# Patient Record
Sex: FEMALE | Race: WHITE | NOT HISPANIC OR LATINO | Employment: OTHER | URBAN - METROPOLITAN AREA
[De-identification: names, ages, dates, MRNs, and addresses within clinical notes are randomized per-mention and may not be internally consistent; named-entity substitution may affect disease eponyms.]

---

## 2017-02-08 ENCOUNTER — TELEPHONE (OUTPATIENT)
Dept: FAMILY MEDICINE | Facility: CLINIC | Age: 62
End: 2017-02-08

## 2017-02-08 ENCOUNTER — TRANSFERRED RECORDS (OUTPATIENT)
Dept: HEALTH INFORMATION MANAGEMENT | Facility: CLINIC | Age: 62
End: 2017-02-08

## 2017-02-09 NOTE — TELEPHONE ENCOUNTER
Do not call for mammos. Pt goes to [facility] for mammos. She had one done last yr and is not due yet. I called  [facility] and they will be faxing a report to radiology dept. Will await and update HM and send to [facility]. NX

## 2017-02-22 DIAGNOSIS — F41.1 GENERALIZED ANXIETY DISORDER: ICD-10-CM

## 2017-02-23 RX ORDER — VENLAFAXINE HYDROCHLORIDE 37.5 MG/1
75 CAPSULE, EXTENDED RELEASE ORAL DAILY
Qty: 180 CAPSULE | Refills: 0 | Status: SHIPPED | OUTPATIENT
Start: 2017-02-23 | End: 2017-05-18 | Stop reason: DRUGHIGH

## 2017-03-15 ENCOUNTER — TELEPHONE (OUTPATIENT)
Dept: FAMILY MEDICINE | Facility: CLINIC | Age: 62
End: 2017-03-15

## 2017-03-15 ENCOUNTER — TRANSFERRED RECORDS (OUTPATIENT)
Dept: HEALTH INFORMATION MANAGEMENT | Facility: CLINIC | Age: 62
End: 2017-03-15

## 2017-03-15 NOTE — TELEPHONE ENCOUNTER
Panel Management Review      Patient has the following on her problem list:     Hypertension   Last three blood pressure readings:  BP Readings from Last 3 Encounters:   12/08/16 131/73   04/26/16 138/78   12/17/15 126/82     Blood pressure: Passed    HTN Guidelines:  Age 18-59 BP range:  Less than 140/90  Age 60-85 with Diabetes:  Less than 140/90  Age 60-85 without Diabetes:  less than 150/90      Composite cancer screening  Chart review shows that this patient is due/due soon for the following Pap Smear  Summary:    Patient is due/failing the following:   PAP and PHYSICAL    Action needed:   Patient needs office visit for Physical/pap.    Type of outreach:    Sent [employer] message.    Questions for provider review:    None                                                                                                                                    LILA Shields MA       Chart routed to close .

## 2017-03-23 ENCOUNTER — TRANSFERRED RECORDS (OUTPATIENT)
Dept: HEALTH INFORMATION MANAGEMENT | Facility: CLINIC | Age: 62
End: 2017-03-23

## 2017-05-13 DIAGNOSIS — F41.1 GENERALIZED ANXIETY DISORDER: ICD-10-CM

## 2017-05-13 RX ORDER — VENLAFAXINE HYDROCHLORIDE 37.5 MG/1
CAPSULE, EXTENDED RELEASE ORAL
Qty: 180 CAPSULE | Refills: 0 | Status: CANCELLED | OUTPATIENT
Start: 2017-05-13

## 2017-05-16 NOTE — TELEPHONE ENCOUNTER
Please schedule for an appointment, she has not been seen for this in the past year. Please give her 30 day supply and have her come in for follow up for refills.   Kristen Kehr PA-C

## 2017-05-16 NOTE — TELEPHONE ENCOUNTER
Pt advised at last refill ov needed, no appointment made.  To provider to advise.  Suyapa Perkins RN

## 2017-05-18 ENCOUNTER — OFFICE VISIT (OUTPATIENT)
Dept: FAMILY MEDICINE | Facility: CLINIC | Age: 62
End: 2017-05-18
Payer: COMMERCIAL

## 2017-05-18 VITALS
DIASTOLIC BLOOD PRESSURE: 84 MMHG | BODY MASS INDEX: 41.88 KG/M2 | WEIGHT: 244 LBS | SYSTOLIC BLOOD PRESSURE: 154 MMHG | HEART RATE: 83 BPM | OXYGEN SATURATION: 94 % | TEMPERATURE: 97.9 F

## 2017-05-18 DIAGNOSIS — N39.498 OTHER URINARY INCONTINENCE: ICD-10-CM

## 2017-05-18 DIAGNOSIS — S32.10XA: ICD-10-CM

## 2017-05-18 DIAGNOSIS — F41.1 GENERALIZED ANXIETY DISORDER: Primary | ICD-10-CM

## 2017-05-18 DIAGNOSIS — T14.8XXA HEMATOMA: ICD-10-CM

## 2017-05-18 PROCEDURE — 99213 OFFICE O/P EST LOW 20 MIN: CPT | Performed by: PHYSICIAN ASSISTANT

## 2017-05-18 RX ORDER — VENLAFAXINE HYDROCHLORIDE 75 MG/1
75 CAPSULE, EXTENDED RELEASE ORAL DAILY
Qty: 90 CAPSULE | Refills: 1 | Status: ON HOLD | OUTPATIENT
Start: 2017-05-18 | End: 2020-09-23

## 2017-05-18 ASSESSMENT — ANXIETY QUESTIONNAIRES
5. BEING SO RESTLESS THAT IT IS HARD TO SIT STILL: NOT AT ALL
2. NOT BEING ABLE TO STOP OR CONTROL WORRYING: NOT AT ALL
IF YOU CHECKED OFF ANY PROBLEMS ON THIS QUESTIONNAIRE, HOW DIFFICULT HAVE THESE PROBLEMS MADE IT FOR YOU TO DO YOUR WORK, TAKE CARE OF THINGS AT HOME, OR GET ALONG WITH OTHER PEOPLE: NOT DIFFICULT AT ALL
1. FEELING NERVOUS, ANXIOUS, OR ON EDGE: NOT AT ALL
7. FEELING AFRAID AS IF SOMETHING AWFUL MIGHT HAPPEN: NOT AT ALL
3. WORRYING TOO MUCH ABOUT DIFFERENT THINGS: NOT AT ALL
6. BECOMING EASILY ANNOYED OR IRRITABLE: NOT AT ALL
GAD7 TOTAL SCORE: 0

## 2017-05-18 ASSESSMENT — PATIENT HEALTH QUESTIONNAIRE - PHQ9: 5. POOR APPETITE OR OVEREATING: NOT AT ALL

## 2017-05-18 NOTE — MR AVS SNAPSHOT
After Visit Summary   5/18/2017    Mary Lou Connelly    MRN: [id number]           Patient Information     Date Of Birth          1955        Visit Information        Provider Department      5/18/2017 11:50 AM Kehr, Kristen M, PA-C [facility]        Today's Diagnoses     Generalized anxiety disorder           Follow-ups after your visit        Follow-up notes from your care team     Return in about 6 months (around 11/18/2017).      Who to contact     If you have questions or need follow up information about today's clinic visit or your schedule please contact [facility] directly at [phone].  Normal or non-critical lab and imaging results will be communicated to you by MyChart, letter or phone within 4 business days after the clinic has received the results. If you do not hear from us within 7 days, please contact the clinic through [employer] or phone. If you have a critical or abnormal lab result, we will notify you by phone as soon as possible.  Submit refill requests through [employer] or call your pharmacy and they will forward the refill request to us. Please allow 3 business days for your refill to be completed.          Additional Information About Your Visit        MyChart Information     [employer] gives you secure access to your electronic health record. If you see a primary care provider, you can also send messages to your care team and make appointments. If you have questions, please call your primary care clinic.  If you do not have a primary care provider, please call [phone] and they will assist you.        Care EveryWhere ID     This is your Care EveryWhere ID. This could be used by other organizations to access your [facility] medical records  [id number]        Your Vitals Were     Pulse Temperature Pulse Oximetry BMI (Body Mass Index)          83 97.9  F (36.6  C) (Oral) 94% 41.88 kg/m2         Blood Pressure from Last 3 Encounters:   05/18/17 154/84    12/08/16 131/73   04/26/16 138/78    Weight from Last 3 Encounters:   05/18/17 244 lb (110.7 kg)   12/08/16 239 lb (108.4 kg)   04/26/16 236 lb (107 kg)              Today, you had the following     No orders found for display         Today's Medication Changes          These changes are accurate as of: 5/18/17 12:25 PM.  If you have any questions, ask your nurse or doctor.               These medicines have changed or have updated prescriptions.        Dose/Directions    venlafaxine 75 MG 24 hr capsule   Commonly known as:  EFFEXOR-XR   This may have changed:  medication strength   Used for:  Generalized anxiety disorder   Changed by:  Kehr, Kristen M, PA-C        Dose:  75 mg   Take 1 capsule (75 mg) by mouth daily   Quantity:  90 capsule   Refills:  1            Where to get your medicines      These medications were sent to [facility] [address] [place] [address] [address] [facility]  [address] [facility] [address]     Phone:  [phone]     venlafaxine 75 MG 24 hr capsule                Primary Care Provider Office Phone # Fax #    Kristen M Kehr, PA-C [phone]       [facility] [address] [facility] [address]        Thank you!     Thank you for choosing [facility]  for your care. Our goal is always to provide you with excellent care. Hearing back from our patients is one way we can continue to improve our services. Please take a few minutes to complete the written survey that you may receive in the mail after your visit with us. Thank you!             Your Updated Medication List - Protect others around you: Learn how to safely use, store and throw away your medicines at www.disposemymeds.org.          This list is accurate as of: 5/18/17 12:25 PM.  Always use your most recent med list.                   Brand Name Dispense Instructions for use    ALEVE PO      Take 220 mg by mouth       B-COMPLEX PO       Take by mouth daily       cetirizine-psuedoePHEDrine 5-120 MG per 12 hr tablet    zyrTEC-D    60 tablet    Take 1 tablet by mouth 2 times daily       cholecalciferol 5000 UNITS Caps          FISH OIL PO      Take 1,400 mg by mouth daily       FOSAMAX PO          METAMUCIL PO      Take 1 teaspoonful by mouth daily Take one teaspoon daily as needed       MULTI-VITAMIN PO      ONE TABLET ONCE A DAY       venlafaxine 75 MG 24 hr capsule    EFFEXOR-XR    90 capsule    Take 1 capsule (75 mg) by mouth daily
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
## 2017-05-18 NOTE — NURSING NOTE
"Chief Complaint   Patient presents with     Anxiety     Refill Request       Initial /84 (Cuff Size: Adult Large)  Pulse 83  Temp 97.9  F (36.6  C) (Oral)  Wt 244 lb (110.7 kg)  SpO2 94%  BMI 41.88 kg/m2 Estimated body mass index is 41.88 kg/(m^2) as calculated from the following:    Height as of 12/8/16: 5' 4\" (1.626 m).    Weight as of this encounter: 244 lb (110.7 kg).  Medication Reconciliation: complete    LILA Shields MA  "

## 2017-05-19 ASSESSMENT — PATIENT HEALTH QUESTIONNAIRE - PHQ9: SUM OF ALL RESPONSES TO PHQ QUESTIONS 1-9: 4

## 2017-05-19 ASSESSMENT — ANXIETY QUESTIONNAIRES: GAD7 TOTAL SCORE: 0

## 2017-06-05 DIAGNOSIS — J30.1 SEASONAL ALLERGIC RHINITIS DUE TO POLLEN: Primary | ICD-10-CM

## 2017-06-05 DIAGNOSIS — J30.2 SEASONAL ALLERGIES: ICD-10-CM

## 2017-06-05 RX ORDER — CETIRIZINE HYDROCHLORIDE, PSEUDOEPHEDRINE HYDROCHLORIDE 5; 120 MG/1; MG/1
TABLET, FILM COATED, EXTENDED RELEASE ORAL
Qty: 168 TABLET | Refills: 1 | Status: SHIPPED | OUTPATIENT
Start: 2017-06-05 | End: 2018-03-01

## 2017-06-06 ENCOUNTER — TRANSFERRED RECORDS (OUTPATIENT)
Dept: HEALTH INFORMATION MANAGEMENT | Facility: CLINIC | Age: 62
End: 2017-06-06

## 2017-06-27 ENCOUNTER — TELEPHONE (OUTPATIENT)
Dept: FAMILY MEDICINE | Facility: CLINIC | Age: 62
End: 2017-06-27

## 2017-06-27 NOTE — TELEPHONE ENCOUNTER
Panel Management Review      Patient has the following on her problem list:     Hypertension   Last three blood pressure readings:  BP Readings from Last 3 Encounters:   05/18/17 154/84   12/08/16 131/73   04/26/16 138/78     Blood pressure: FAILED    HTN Guidelines:  Age 18-59 BP range:  Less than 140/90  Age 60-85 with Diabetes:  Less than 140/90  Age 60-85 without Diabetes:  less than 150/90      Composite cancer screening  Chart review shows that this patient is due/due soon for the following Pap Smear  Summary:    Patient is due/failing the following:   BP CHECK and PAP    Action needed:   Patient needs office visit for physical/pap and blood pressure recheck.    Type of outreach:    Sent [employer] message.    Questions for provider review:    None                                                                                                                                    Kenn MERCADO MA       Chart routed to close .

## 2017-10-01 ENCOUNTER — HEALTH MAINTENANCE LETTER (OUTPATIENT)
Age: 62
End: 2017-10-01

## 2017-10-09 ENCOUNTER — TELEPHONE (OUTPATIENT)
Dept: FAMILY MEDICINE | Facility: CLINIC | Age: 62
End: 2017-10-09

## 2017-10-09 NOTE — TELEPHONE ENCOUNTER
Panel Management Review      Patient has the following on her problem list:     Hypertension   Last three blood pressure readings:  BP Readings from Last 3 Encounters:   05/18/17 154/84   12/08/16 131/73   04/26/16 138/78     Blood pressure: FAILED    HTN Guidelines:  Age 18-59 BP range:  Less than 140/90  Age 60-85 with Diabetes:  Less than 140/90  Age 60-85 without Diabetes:  less than 150/90        Composite cancer screening  Chart review shows that this patient is due/due soon for the following Pap Smear  Summary:    Patient is due/failing the following:   BP CHECK and PHYSICAL    Action needed:   Patient needs office visit for physical/pap and bp recheck.    Type of outreach:    Sent [employer] message.    Questions for provider review:    None                                                                                                                                    Kenn MERCADO MA       Chart routed to close .

## 2017-10-15 ENCOUNTER — HEALTH MAINTENANCE LETTER (OUTPATIENT)
Age: 62
End: 2017-10-15

## 2017-11-06 DIAGNOSIS — F41.1 GENERALIZED ANXIETY DISORDER: ICD-10-CM

## 2017-11-07 NOTE — TELEPHONE ENCOUNTER
PCP asked patient to submit an [employer] E-visit this month for anxiety med refills.    [employer] message sent to patient.   See message for details.    Renetta Lomas RN

## 2017-11-08 RX ORDER — VENLAFAXINE HYDROCHLORIDE 75 MG/1
CAPSULE, EXTENDED RELEASE ORAL
Qty: 90 CAPSULE | Refills: 0 | OUTPATIENT
Start: 2017-11-08

## 2018-01-30 ENCOUNTER — TELEPHONE (OUTPATIENT)
Dept: FAMILY MEDICINE | Facility: CLINIC | Age: 63
End: 2018-01-30

## 2018-01-30 NOTE — TELEPHONE ENCOUNTER
Panel Management Review      Patient has the following on her problem list:     Hypertension   Last three blood pressure readings:  BP Readings from Last 3 Encounters:   05/18/17 154/84   12/08/16 131/73   04/26/16 138/78     Blood pressure: FAILED    HTN Guidelines:  Age 18-59 BP range:  Less than 140/90  Age 60-85 with Diabetes:  Less than 140/90  Age 60-85 without Diabetes:  less than 150/90      Composite cancer screening  Chart review shows that this patient is due/due soon for the following Pap Smear and Mammogram  Summary:    Patient is due/failing the following:   PHYSICAL    Action needed:   Patient needs office visit for physical/pap.    Type of outreach:    Sent [employer] message.    Questions for provider review:    None                                                                                                                                    Kenn MERCADO MA       Chart routed to close .

## 2018-02-08 ENCOUNTER — TRANSFERRED RECORDS (OUTPATIENT)
Dept: HEALTH INFORMATION MANAGEMENT | Facility: CLINIC | Age: 63
End: 2018-02-08

## 2018-02-08 NOTE — TELEPHONE ENCOUNTER
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 Yes/No?  IF YES, list clinic names: NO   Are there outside records? Yes/No? YES - see below   Patient Contact (Y/N) & Call Details: NO    Action: Faxed request      OUTSIDE RECORDS CHECKLIST     CLINIC NAME COMMENTS REC (x) IMG (x)   [facility]  x    [facility]  none X.cssac     [facility]  none x

## 2018-02-08 NOTE — TELEPHONE ENCOUNTER
Records Received From: O     Date/Exam/Location  (specify location if different)   Office Notes: 2/8/18, 9/27/17, 4/5/17, 3/15/17, 2/8/17, 12/14/16, 11/16/16, 10/5/16   Radiology Reports: - xray hip 2/8/18, 9/27/17, 2/8/17, 11/16/16  - xray pelvis 12/14/16  Liset Llanos Notified (Y/N): no     ACTION    What did you do? Faxed request to [facility]

## 2018-02-08 NOTE — TELEPHONE ENCOUNTER
Radiology Reports From: [facility]   Exam Date/Name: MRI pelvis 3/23/17  MRI right hip 3/15/17  CT right hip 2/8/17   Comments: Reports sent to clinic. Notified Fern to pull images

## 2018-02-20 NOTE — TELEPHONE ENCOUNTER
Records Received From: [facility]     Date/Exam/Location  (specify location if different)   Radiology Reports: MR lumbar 8/16/16- [facility]   MR sacrum coccyx 8/17/16- [facility]   NM bone whole body 8/23/16- [facility]   Liset Llanos Notified (Y/N):      Note:   Per fax images will be pushed

## 2018-02-26 ASSESSMENT — ENCOUNTER SYMPTOMS
LEG PAIN: 0
MYALGIAS: 1
HYPOTENSION: 0
ORTHOPNEA: 0
HYPERTENSION: 0
JOINT SWELLING: 1
SYNCOPE: 0
LIGHT-HEADEDNESS: 0
EXERCISE INTOLERANCE: 0
ARTHRALGIAS: 1
BACK PAIN: 1
PALPITATIONS: 0
STIFFNESS: 1
SLEEP DISTURBANCES DUE TO BREATHING: 0

## 2018-02-26 ASSESSMENT — HOOS S4: HOW SEVERE IS YOUR HIP JOINT STIFFNESS AFTER FIRST WAKENING IN THE MORNING?: SEVERE

## 2018-02-26 ASSESSMENT — ACTIVITIES OF DAILY LIVING (ADL)
ADL_MEAN: 2.29
ADL_SUBSCALE_SCORE: 42.64
ADL_SUM: 39

## 2018-02-27 NOTE — TELEPHONE ENCOUNTER
Radiology Reports [facility]: [facility]    Exam Date/Name: MR sacrum coccyx 8/17/16  NM bone whole body 8/23/16  MR Lumbar 8/16/16   Comments: Waiting for images to be pushed

## 2018-02-28 NOTE — TELEPHONE ENCOUNTER
Phone Call:    Who did you talk to? (or) Who did you call? NOMI arzate   Call Detail/Action: She will check on images being pushed

## 2018-03-01 ENCOUNTER — OFFICE VISIT (OUTPATIENT)
Dept: ORTHOPEDICS | Facility: CLINIC | Age: 63
End: 2018-03-01
Payer: COMMERCIAL

## 2018-03-01 ENCOUNTER — PRE VISIT (OUTPATIENT)
Dept: ORTHOPEDICS | Facility: CLINIC | Age: 63
End: 2018-03-01

## 2018-03-01 VITALS — WEIGHT: 249.5 LBS | HEIGHT: 64 IN | BODY MASS INDEX: 42.59 KG/M2

## 2018-03-01 DIAGNOSIS — M16.10 HIP ARTHRITIS: Primary | ICD-10-CM
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
## 2018-03-01 NOTE — MR AVS SNAPSHOT
After Visit Summary   3/1/2018    Mary Lou Connelly    MRN: [id number]           Patient Information     Date Of Birth          1955        Visit Information        Provider Department      3/1/2018 9:15 AM Emmanuel Box MD [facility]        Today's Diagnoses     Hip arthritis    -  1       Follow-ups after your visit        Follow-up notes from your care team     Return in about 6 months (around 9/1/2018).      Your next 10 appointments already scheduled     Mar 09, 2018  8:00 AM CST   (Arrive by 7:45 AM)   CT PELVIS W/O CONTRAST with UCCT2   [facility] [place] ([facility])    [address]  1st Floor  [place] [facility] [place] [address] [phone]           Please bring any scans or X-rays taken at other hospitals, if similar tests were done. Also bring a list of your medicines, including vitamins, minerals and over-the-counter drugs. It is safest to leave personal items at home.  Be sure to tell your doctor:   If you have any allergies.   If there s any chance you are pregnant.   If you are breastfeeding.  You may have contrast for this exam. To prepare:   Do not eat or drink for 2 hours before your exam. If you need to take medicine, you may take it with small sips of water. (We may ask you to take liquid medicine as well.)   The day before your exam, drink extra fluids at least six 8-ounce glasses (unless your doctor tells you to restrict your fluids).   You will be given instructions on how to drink the contrast.  Patients over 70 or patients with diabetes or kidney problems:   If you haven t had a blood test (creatinine test) within the last 30 days, the Cardiologist/Radiologist may require you to get this test prior to your exam.  If you have diabetes:   Continue to take your metformin medication on the day of your exam  Please wear loose clothing, such as a sweat suit or jogging clothes. Avoid snaps, zippers and other metal. We may ask  you to undress and put on a hospital gown.  If you have any questions, please call the Imaging Department where you will have your exam.            Mar 09, 2018  8:30 AM CST   (Arrive by 8:15 AM)   MR PELVIS W/O & W CONTRAST with [id number]   [facility] ([facility] [place])    [address]  [address]st [facility] [id number] [phone]           Take your medicines as usual, unless your doctor tells you not to. Bring a list of your current medicines to your exam (including vitamins, minerals and over-the-counter drugs).  You may or may not receive intravenous (IV) contrast for this exam pending the discretion of the Radiologist.  You do not need to do anything special to prepare.  The MRI machine uses a strong magnet. Please wear clothes without metal (snaps, zippers). A sweatsuit works well, or we may give you a hospital gown.  Please remove any body piercings and hair extensions before you arrive. You will also remove watches, jewelry, hairpins, wallets, dentures, partial dental plates and hearing aids. You may wear contact lenses, and you may be able to wear your rings. We have a safe place to keep your personal items, but it is safer to leave them at home.  **IMPORTANT** THE INSTRUCTIONS BELOW ARE ONLY FOR THOSE PATIENTS WHO HAVE BEEN PRESCRIBED SEDATION OR GENERAL ANESTHESIA DURING THEIR MRI PROCEDURE:  IF YOUR DOCTOR PRESCRIBED ORAL SEDATION (take medicine to help you relax during your exam):   You must get the medicine from your doctor (oral medication) before you arrive. Bring the medicine to the exam. Do not take it at home. You ll be told when to take it upon arriving for your exam.   Arrive one hour early. Bring someone who can take you home after the test. Your medicine will make you sleepy. After the exam, you may not drive, take a bus or take a taxi by yourself.  IF YOUR DOCTOR PRESCRIBED IV SEDATION:   Arrive one hour early. Bring someone who can take you  home after the test. Your medicine will make you sleepy. After the exam, you may not drive, take a bus or take a taxi by yourself.   No eating 6 hours before your exam. You may have clear liquids up until 4 hours before your exam. (Clear liquids include water, clear tea, black coffee and fruit juice without pulp.)  IF YOUR DOCTOR PRESCRIBED ANESTHESIA (be asleep for your exam):   Arrive 1 1/2 hours early. Bring someone who can take you home after the test. You may not drive, take a bus or take a taxi by yourself.   No eating 8 hours before your exam. You may have clear liquids up until 4 hours before your exam. (Clear liquids include water, clear tea, black coffee and fruit juice without pulp.)   You will spend four to five hours in the recovery room.  Please call the Imaging Department at your exam site with any questions.            Mar 09, 2018  9:15 AM CST   LAB with [facility] ([facility])    [address] [place] [id number] [phone]           Please do not eat 10-12 hours before your appointment if you are coming in fasting for labs on lipids, cholesterol, or glucose (sugar). This does not apply to pregnant women. Water, hot tea and black coffee (with nothing added) are okay. Do not drink other fluids, diet soda or chew gum.              Future tests that were ordered for you today     Open Future Orders        Priority Expected Expires Ordered    Alkaline phosphatase Routine  3/31/2018 3/1/2018    Calcium Routine  3/31/2018 3/1/2018    Creatinine Routine  3/31/2018 3/1/2018    Phosphorus Routine  3/31/2018 3/1/2018    Parathyroid Hormone Intact Routine  3/31/2018 3/1/2018    Urea nitrogen Routine  3/31/2018 3/1/2018    Vitamin D Deficiency Routine  3/31/2018 3/1/2018    CT Pelvis w/o contrast Routine  3/1/2019 3/1/2018    MRI Pelvis w & w/o contrast Routine  3/1/2019 3/1/2018            Who to contact     Please call your clinic at  "[phone] to:    Ask questions about your health    Make or cancel appointments    Discuss your medicines    Learn about your test results    Speak to your doctor            Additional Information About Your Visit        [employer] Information     [employer] gives you secure access to your electronic health record. If you see a primary care provider, you can also send messages to your care team and make appointments. If you have questions, please call your primary care clinic.  If you do not have a primary care provider, please call [phone] and they will assist you.      V[employer]creen is an electronic gateway that provides easy, online access to your medical records. With [employer], you can request a clinic appointment, read your test results, renew a prescription or communicate with your care team.     To access your existing account, please contact your [facility] or call [phone] for assistance.        Care EveryWhere ID     This is your Care EveryWhere ID. This could be used by other organizations to access your [facility] medical records  [id number]        Your Vitals Were     Height BMI (Body Mass Index)                1.626 m (5' 4\") 42.83 kg/m2           Blood Pressure from Last 3 Encounters:   05/18/17 154/84   12/08/16 131/73   04/26/16 138/78    Weight from Last 3 Encounters:   03/01/18 113.2 kg (249 lb 8 oz)   05/18/17 110.7 kg (244 lb)   12/08/16 108.4 kg (239 lb)               Primary Care Provider Office Phone # Fax #    Kristen M Kehr, PA-C [phone]       [address] [facility] [address]        Equal Access to Services     [facility]: Hadii aad ku hadasho Soomaali, waaxda luqadaha, qaybta kaalmada syeda, adi muñoz . So [facility] [phone].    ATENCIÓN: Si habla español, tiene a cooper disposición servicios gratuitos de asistencia lingüística. Llame al [phone].    We comply with applicable federal civil rights " laws and [place] laws. We do not discriminate on the basis of race, color, national origin, age, disability, sex, sexual orientation, or gender identity.            Thank you!     Thank you for choosing [facility]  for your care. Our goal is always to provide you with excellent care. Hearing back from our patients is one way we can continue to improve our services. Please take a few minutes to complete the written survey that you may receive in the mail after your visit with us. Thank you!             Your Updated Medication List - Protect others around you: Learn how to safely use, store and throw away your medicines at www.disposemymeds.org.          This list is accurate as of 3/1/18 11:59 PM.  Always use your most recent med list.                   Brand Name Dispense Instructions for use Diagnosis    ALEVE PO      Take 220 mg by mouth        B-COMPLEX PO      Take by mouth daily        cholecalciferol 5000 UNITS Caps           FISH OIL PO      Take 1,400 mg by mouth daily        FOSAMAX PO           HYDROCHLOROTHIAZIDE PO      Take 25 mg by mouth daily        METAMUCIL PO      Take 1 teaspoonful by mouth daily Take one teaspoon daily as needed        MULTI-VITAMIN PO      ONE TABLET ONCE A DAY        venlafaxine 75 MG 24 hr capsule    EFFEXOR-XR    90 capsule    Take 1 capsule (75 mg) by mouth daily    Generalized anxiety disorder

## 2018-03-01 NOTE — NURSING NOTE
"Chief Complaint   Patient presents with     Consult     Pt states that she is here today for FX Pelvis and Right Hip Osteoarthritis. 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 Referring:  JULIO GREEN THERESA         62 year old  1955    Ht 1.626 m (5' 4\")  Wt 113.2 kg (249 lb 8 oz)  BMI 42.83 kg/m2        [facility] [address] [address] [place], [place] - [address] [facility] [address] [address] [place], [place] - [address] [facility] [address] [place], [place] - [address] [facility] AT SEC OF [place] [address] [facility]    Allergies   Allergen Reactions     Nkda [No Known Drug Allergies]      Current Outpatient Prescriptions   Medication     HYDROCHLOROTHIAZIDE PO     venlafaxine (EFFEXOR-XR) 75 MG 24 hr capsule     Alendronate Sodium (FOSAMAX PO)     Naproxen Sodium (ALEVE PO)     cholecalciferol 5000 UNITS CAPS     B Complex-Biotin-FA (B-COMPLEX PO)     Omega-3 Fatty Acids (FISH OIL PO)     Psyllium (METAMUCIL PO)     MULTI-VITAMIN OR     No current facility-administered medications for this visit.          Questionnaires:  HOOS Hip Dysfunction & Osteoarthritis Outcome Questionnaire    Hip Dysfunction & Osteoarthritis Outcome Score (HOOS), English Version LK 2.0 (Shravan So, Claudio FROST., 2003) 2/26/2018   S1. Do you feel grinding, hear clicking or any other type of noise from your hip? Often   S2. Difficulties spreading legs wide apart Severe   S3. Difficulties to stride out when walking Severe   S4. How severe is your hip joint stiffness after first wakening in the morning? Severe   S5. How severe is your hip stiffness after sitting, lying or resting LATER IN THE DAY? Severe   Symptom Count 5   Symptom Sum 15   Symptom Mean 3   Symptom Subscale Score 25   P1. How often is your hip painful? Daily   P2. Straightening your hip fully Moderate   P3. Bending your hip FULLY Moderate   P4. Walking on a " flat surface Moderate   P5. Going up or down stairs Moderate   P6. At night while in bed Moderate   P7. Sitting or lying Moderate   P8. Standing upright Moderate   P9. Walking on a hard surface (asphalt, concrete, etc.) Moderate   P10. Walking on an uneven surface Moderate   Pain Count 10   Pain Sum 21   Pain Mean 2.1   Pain Subscale Score 47.5   A1. Descending stairs Moderate   A2. Ascending stairs Moderate   A3. Rising from sitting Moderate   A4. Standing Moderate   A5. Bending to the floor/ an object Mild   A6. Walking on a flat surface Moderate   A7. Getting in/out of car Severe   A8. Going shopping Severe   A9. Putting on socks/stockings Moderate   A10. Rising from bed Moderate   A11. Taking off socks/stockings Moderate   A12. Lying in bed (turning over, maintaining hip position) Severe   A13. Getting in/out of bed Severe   A14. Sitting Moderate   A15. Getting on/off toilet Moderate   A16. Heavy domestic duties (moving heavy boxes, scrubbing floors, etc.) Extreme   A17. Light domestic duties (cooking, dusting, etc.) Moderate   ADL Count 17   ADL Sum 39   ADL Mean 2.29   ADL Subscale Score 42.64   SP1. Squatting Severe   SP2. Running Severe   SP3. Twisting/pivoting on loaded leg Severe   SP4. Walking on uneven surface Severe   Sports/Rec Count 4   Sports/Rec Sum 12   Sports/Rec Mean 3   Sports/Rec Subscale Score 25   Q1. How often are you aware of your hip problem? Daily   Q2. Have you modified you life style to avoid activities potentially damaging to your hip? Totally   Q3. How much are you troubled with lack of confidence in your hip? Extremely   Q4. In general, how much difficulty do you have with your hip? Extreme   QOL Count 4   QOL Sum 15   QOL Mean 3.75   Quality of Life Subscale Score 6.25              Promis 10 Assessment    PROMIS 10 2/26/2018   In general, would you say your health is: Good   In general, would you say your quality of life is: Very good   In general, how would you rate your  physical health? Good   In general, how would you rate your mental health, including your mood and your ability to think? Very good   In general, how would you rate your satisfaction with your social activities and relationships? Very good   In general, please rate how well you carry out your usual social activities and roles Very good   To what extent are you able to carry out your everyday physical activities such as walking, climbing stairs, carrying groceries, or moving a chair? Moderately   How often have you been bothered by emotional problems such as feeling anxious, depressed or irritable? Rarely   How would you rate your fatigue on average? None   How would you rate your pain on average?   0 = No Pain  to  10 = Worst Imaginable Pain 3   In general, would you say your health is: 3   In general, would you say your quality of life is: 4   In general, how would you rate your physical health? 3   In general, how would you rate your mental health, including your mood and your ability to think? 4   In general, how would you rate your satisfaction with your social activities and relationships? 4   In general, please rate how well you carry out your usual social activities and roles. (This includes activities at home, at work and in your community, and responsibilities as a parent, child, spouse, employee, friend, etc.) 4   To what extent are you able to carry out your everyday physical activities such as walking, climbing stairs, carrying groceries, or moving a chair? 3   In the past 7 days, how often have you been bothered by emotional problems such as feeling anxious, depressed, or irritable? 2   In the past 7 days, how would you rate your fatigue on average? 1   In the past 7 days, how would you rate your pain on average, where 0 means no pain, and 10 means worst imaginable pain? 3   Global Mental Health Score 16   Global Physical Health Score 15   PROMIS TOTAL - SUBSCORES 31   Some recent data might be hidden               Cary Jarquin C.M.A.

## 2018-03-09 ENCOUNTER — RADIANT APPOINTMENT (OUTPATIENT)
Dept: CT IMAGING | Facility: CLINIC | Age: 63
End: 2018-03-09
Attending: ORTHOPAEDIC SURGERY
Payer: COMMERCIAL

## 2018-03-09 ENCOUNTER — RADIANT APPOINTMENT (OUTPATIENT)
Dept: MRI IMAGING | Facility: CLINIC | Age: 63
End: 2018-03-09
Attending: ORTHOPAEDIC SURGERY
Payer: COMMERCIAL

## 2018-03-09 LAB
ALP SERPL-CCNC: 103 U/L (ref 40–150)
BUN SERPL-MCNC: 27 MG/DL (ref 7–30)
CALCIUM SERPL-MCNC: 9.9 MG/DL (ref 8.5–10.1)
CREAT SERPL-MCNC: 0.86 MG/DL (ref 0.52–1.04)
DEPRECATED CALCIDIOL+CALCIFEROL SERPL-MC: 45 UG/L (ref 20–75)
GFR SERPL CREATININE-BSD FRML MDRD: 67 ML/MIN/1.7M2
PHOSPHATE SERPL-MCNC: 2.7 MG/DL (ref 2.5–4.5)
PTH-INTACT SERPL-MCNC: 59 PG/ML (ref 18–80)

## 2018-03-09 RX ORDER — GADOBUTROL 604.72 MG/ML
10 INJECTION INTRAVENOUS ONCE
Status: COMPLETED | OUTPATIENT
Start: 2018-03-09 | End: 2018-03-09

## 2018-03-09 RX ADMIN — GADOBUTROL 10 ML: 604.72 INJECTION INTRAVENOUS at 08:23

## 2018-03-09 NOTE — DISCHARGE INSTRUCTIONS
MRI Contrast Discharge Instructions    The IV contrast you received today will pass out of your body in your  urine. This will happen in the next 24 hours. You will not feel this process.  Your urine will not change color.    Drink at least 4 extra glasses of water or juice today (unless your doctor  has restricted your fluids). This reduces the stress on your kidneys.  You may take your regular medicines.    If you are on dialysis: It is best to have dialysis today.    If you have a reaction: Most reactions happen right away. If you have  any new symptoms after leaving the hospital (such as hives or swelling),  call your hospital at the correct number below. Or call your family doctor.  If you have breathing distress or wheezing, call 911.    Special instructions: ***    I have read and understand the above information.    Signature:______________________________________ Date:___________    Staff:__________________________________________ Date:___________     Time:__________    [facility] Departments:    ___[place]: [phone]  ___[facility]: [phone]  ___[place]: [phone] ___[facility]: [phone]  ___[facility]: [phone]  ___[facility]: [phone]  ___[phone]  ___[facility]: [phone]  ___[phone]

## 2018-03-15 NOTE — PROGRESS NOTES
The results were reviewed and any abnormal results were communicated to the patient via MY Chart about plan of action.    The multiple bony insufficiency type fractures are likely the result of the radiation.    Emmanuel Box MD  3/15/2018  7:24 AM

## 2018-03-15 NOTE — PROGRESS NOTES
The results are either normal or are clinically acceptable.    Emmanuel Box MD  3/15/2018  7:23 AM

## 2018-03-15 NOTE — PROGRESS NOTES
The results were reviewed and any abnormal results were communicated to the patient via MY Chart about plan of action.    I do not think any action is required based upon these test results.    Emmanuel Box MD  3/15/2018  7:24 AM

## 2018-05-10 ENCOUNTER — TELEPHONE (OUTPATIENT)
Dept: FAMILY MEDICINE | Facility: CLINIC | Age: 63
End: 2018-05-10

## 2018-05-10 NOTE — TELEPHONE ENCOUNTER
Panel Management Review      Patient has the following on her problem list:     Depression / Dysthymia review    Measure:  Needs PHQ-9 score of 4 or less during index window.  Administer PHQ-9 and if score is 5 or more, send encounter to provider for next steps.    5 - 7 month window range:     PHQ-9 SCORE 4/1/2013 8/12/2015 5/18/2017   Total Score 5 - -   Total Score MyChart - 2 -   Total Score - - 4       If PHQ-9 recheck is 5 or more, route to provider for next steps.    Patient is due for:  PHQ9 and DAP    Hypertension   Last three blood pressure readings:  BP Readings from Last 3 Encounters:   05/18/17 154/84   12/08/16 131/73   04/26/16 138/78     Blood pressure: FAILED    HTN Guidelines:  Age 18-59 BP range:  Less than 140/90  Age 60-85 with Diabetes:  Less than 140/90  Age 60-85 without Diabetes:  less than 150/90      Composite cancer screening  Chart review shows that this patient is due/due soon for the following Pap Smear and Mammogram  Summary:    Patient is due/failing the following:   BP CHECK, DAP, MAMMOGRAM, PAP, PHQ9 and PHYSICAL    Action needed:   Patient needs office visit for Physical/pap.    Type of outreach:    Sent Tizarot message.    Questions for provider review:    None                                                                                                                                    Kenn MERCADO MA       Chart routed to close .

## 2018-08-29 DIAGNOSIS — G47.33 OBSTRUCTIVE SLEEP APNEA SYNDROME: Primary | Chronic | ICD-10-CM

## 2018-10-02 DIAGNOSIS — M25.551 HIP PAIN, RIGHT: Primary | ICD-10-CM

## 2018-10-04 ENCOUNTER — OFFICE VISIT (OUTPATIENT)
Dept: ORTHOPEDICS | Facility: CLINIC | Age: 63
End: 2018-10-04
Payer: COMMERCIAL

## 2018-10-04 ENCOUNTER — RADIANT APPOINTMENT (OUTPATIENT)
Dept: GENERAL RADIOLOGY | Facility: CLINIC | Age: 63
End: 2018-10-04
Attending: ORTHOPAEDIC SURGERY
Payer: COMMERCIAL

## 2018-10-04 VITALS — HEIGHT: 64 IN | WEIGHT: 242.5 LBS | BODY MASS INDEX: 41.4 KG/M2

## 2018-10-04 DIAGNOSIS — M25.551 HIP PAIN, RIGHT: ICD-10-CM

## 2018-10-04 DIAGNOSIS — M16.7 OTHER SECONDARY OSTEOARTHRITIS OF RIGHT HIP: Primary | ICD-10-CM

## 2018-10-04 RX ORDER — PHENTERMINE HYDROCHLORIDE 15 MG/1
CAPSULE ORAL EVERY MORNING
Status: ON HOLD | COMMUNITY
End: 2020-09-22

## 2018-10-04 RX ORDER — LISINOPRIL 30 MG/1
10 TABLET ORAL DAILY
COMMUNITY
End: 2020-09-18

## 2018-10-04 ASSESSMENT — ENCOUNTER SYMPTOMS
FLANK PAIN: 0
STIFFNESS: 1
DECREASED CONCENTRATION: 0
ARTHRALGIAS: 1
NAUSEA: 0
NERVOUS/ANXIOUS: 1
JAUNDICE: 0
RECTAL PAIN: 0
INSOMNIA: 1
BOWEL INCONTINENCE: 1
DIFFICULTY URINATING: 0
MYALGIAS: 0
CONSTIPATION: 0
PANIC: 0
DYSURIA: 0
BACK PAIN: 1
MUSCLE CRAMPS: 0
BLOATING: 0
HEARTBURN: 1
MUSCLE WEAKNESS: 0
JOINT SWELLING: 0
HEMATURIA: 0
DIARRHEA: 1
BLOOD IN STOOL: 0
ABDOMINAL PAIN: 0
NECK PAIN: 0
VOMITING: 0

## 2018-10-04 NOTE — MR AVS SNAPSHOT
"              After Visit Summary   10/4/2018    Mary Lou Connelly    MRN: [id number]           Patient Information     Date Of Birth          1955        Visit Information        Provider Department      10/4/2018 1:45 PM Emmanuel Box MD [facility]        Today's Diagnoses     Other secondary osteoarthritis of right hip    -  1       Follow-ups after your visit        Who to contact     Please call your clinic at [phone] to:    Ask questions about your health    Make or cancel appointments    Discuss your medicines    Learn about your test results    Speak to your doctor            Additional Information About Your Visit        [employer] Information     [employer] gives you secure access to your electronic health record. If you see a primary care provider, you can also send messages to your care team and make appointments. If you have questions, please call your primary care clinic.  If you do not have a primary care provider, please call [phone] and they will assist you.      [employer] is an electronic gateway that provides easy, online access to your medical records. With [employer], you can request a clinic appointment, read your test results, renew a prescription or communicate with your care team.     To access your existing account, please contact your [facility] or call [phone] for assistance.        Care EveryWhere ID     This is your Care EveryWhere ID. This could be used by other organizations to access your [place] medical records  [id number]        Your Vitals Were     Height BMI (Body Mass Index)                1.626 m (5' 4\") 41.63 kg/m2           Blood Pressure from Last 3 Encounters:   05/18/17 154/84   12/08/16 131/73   04/26/16 138/78    Weight from Last 3 Encounters:   10/04/18 110 kg (242 lb 8 oz)   03/01/18 113.2 kg (249 lb 8 oz)   05/18/17 110.7 kg (244 lb)              Today, you had the following     No orders found for display    "    Primary Care Provider Office Phone # Fax #    Kristen M Kehr, PA-C [phone] [address] [facility] [address]        Equal Access to Services     MEGA ALTAMIRANO : Hadgerman cee ku weso Soomaali, waaxda luqadaha, qaybta kaalmada adeegyada, adi mcdermott laFoxkeaton tipton. So [facility] [phone].    ATENCIÓN: Si habla español, tiene a cooper disposición servicios gratuitos de asistencia lingüística. Llame al [phone].    We comply with applicable federal civil rights laws and [place] laws. We do not discriminate on the basis of race, color, national origin, age, disability, sex, sexual orientation, or gender identity.            Thank you!     Thank you for choosing [facility]  for your care. Our goal is always to provide you with excellent care. Hearing back from our patients is one way we can continue to improve our services. Please take a few minutes to complete the written survey that you may receive in the mail after your visit with us. Thank you!             Your Updated Medication List - Protect others around you: Learn how to safely use, store and throw away your medicines at www.disposemymeds.org.          This list is accurate as of 10/4/18  4:09 PM.  Always use your most recent med list.                   Brand Name Dispense Instructions for use Diagnosis    ALEVE PO      Take 220 mg by mouth        B-COMPLEX PO      Take by mouth daily        cholecalciferol 5000 units Caps           FISH OIL PO      Take 1,400 mg by mouth daily        HYDROCHLOROTHIAZIDE PO      Take 25 mg by mouth daily        lisinopril 30 MG tablet    PRINIVIL,ZESTRIL     Take 10 mg by mouth daily        METAMUCIL PO      Take 1 teaspoonful by mouth daily Take one teaspoon daily as needed        MULTI-VITAMIN PO      ONE TABLET ONCE A DAY        phentermine 15 MG capsule      Take by mouth every morning        venlafaxine 75 MG 24 hr capsule    EFFEXOR-XR    90 capsule    Take 1  capsule (75 mg) by mouth daily    Generalized anxiety disorder

## 2018-10-04 NOTE — PROGRESS NOTES
[facility]  Orthopaedic Oncology Surgery, Joint Replacement Consultation  by Emmanuel Box M.D.    Mary Lou Connelly MRN# [id number]   Age: 62 year old YOB: 1955     Requesting physician: Referred Self  Kehr, Kristen M       Background history:  DX:   1. 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 DJD R hip, ? Post-radiation    TREATMENTS:  1. 22 yrs ago, chemotherapy, Tejinder Rodriguez MD, [place]  2. 22 years ago, radiation therapy to pelvis and left hip, Dr. Real, [facility]             Assessment and Plan:   Assessment:  Advanced right hip osteoarthritis with cystic change on the femoral as well as acetabular side     History of squamous cell cancer anus, status post radiation and chemotherapy     Intrapelvic mass, per report diagnosis hemangioma    Plan:  1. There is no need for further treatment currently  2. Continue with weight loss. Exercises based around arm movements may be helpful.  3. The decision to move forward with treatment will be dependent upon her pain and loss in function rather than solely on radiographic appearance  4. Follow up in a year or sooner if there are any changes            History of Present Illness:     62 year old female with history of squamous cell anal cancer status post radiation and chemotherapy approximately 22 years ago, now in remission( Radiation performed by Dr. Real at [facility]. Chemotherapy managed by Dr. Tejinder Rodriguez of [place]), who presents for evaluation for right total hip arthroplasty. Of note, she has suffered insufficiency fractures of her bilateral superior and inferior pubic rami as well as her coccyx. These fractures were all sustained during minor events such as lifting her grandchildren, or rising from a chair. She is diagnosed with osteopenia and currently on Fosamax as well as vitamin D supplementation. Otherwise, The patient has a long-standing history of right hip pain for which she has been followed by Dr. Woody.  She's never undergone any medical management in terms of corticosteroid injections or physical therapy. However was being followed with serial pelvic x-rays. Due to significant progression of her osteoarthritis, cystic changes, and concern for pelvic reconstruction, Dr. Woody did refer her to Dr. Box for right total hip arthroplasty.  Of note, she does have a history of what she describes as we a hematoma in her right pelvis which is displacing her bladder, this was further evaluated when she developed symptoms of urinary incontinence.  She states she underwent an image guided biopsy, which was sent for pathology and returned as a hematoma. She no longer has symptoms of urinary incontinence.    I last saw this patient on 3/1/18; please see this note for further details. At that time she was interesting in having a right total hip arthroplasty. We planned to have another appointment after the patient had time to attempt weight loss and completed imaging studies. I reviewed a CT of her pelvis taken on 3/9/18 and noted that the multiple bony insufficiency type fractures are likely the result of the radiation. I also reviewed an MRI of her pelvis no 3/9/18 and noted that no action is required based around this imaging. I reviewed labs taken on this same date and similarly noted that they did not necessitate any action.    Today, she states she can feel more discomfort in her right hip. She doesn't feel a large amount of pain due to this. She feels stiffness after long periods of inactivity, such as sleeping. She feels these symptoms have become worse. She can hear clicking or cracking sounds when she walks, which is something that she is concerned about.     She reports that it is difficult for her to find a way to exercise due to her state of disability.           Physical Exam:     Exam deferred.           Data:   All laboratory data reviewed  All imaging studies reviewed by me    XR Pelvis with Right Hip 1  View:    Compared to before, bone formation may have increased.    Total Time = 15 min, 50% of which was spent in counseling and coordination of care as documented above.    Scribe Disclosure:   I, Errol Johnston, am serving as a scribe to document services personally performed by Emmanuel Box MD at this visit, based upon the provider's statements to me. All documentation has been reviewed by the aforementioned provider prior to being entered into the official medical record.

## 2018-10-04 NOTE — NURSING NOTE
"Chief Complaint   Patient presents with     RECHECK     Pt. states that she is returning today for 6 month F/u Right Hip Pain. She is wondering what the Treatment Plan is going to be other than surgery.        62 year old  1955    Ht 1.626 m (5' 4\")  Wt 110 kg (242 lb 8 oz)  BMI 41.63 kg/m2            [facility] [address] [address] [place], [place] - [address] [facility] [address] [place], [place] - [address] [facility] [address] [place], [place] - [address] [facility] [address] [place] [address] [facility]    Allergies   Allergen Reactions     Nkda [No Known Drug Allergies]      Current Outpatient Prescriptions   Medication     B Complex-Biotin-FA (B-COMPLEX PO)     cholecalciferol 5000 UNITS CAPS     HYDROCHLOROTHIAZIDE PO     lisinopril (PRINIVIL,ZESTRIL) 30 MG tablet     MULTI-VITAMIN OR     Naproxen Sodium (ALEVE PO)     Omega-3 Fatty Acids (FISH OIL PO)     phentermine 15 MG capsule     Psyllium (METAMUCIL PO)     venlafaxine (EFFEXOR-XR) 75 MG 24 hr capsule     No current facility-administered medications for this visit.                  "

## 2018-10-22 ENCOUNTER — HEALTH MAINTENANCE LETTER (OUTPATIENT)
Age: 63
End: 2018-10-22

## 2019-05-21 ENCOUNTER — TELEPHONE (OUTPATIENT)
Dept: FAMILY MEDICINE | Facility: CLINIC | Age: 64
End: 2019-05-21

## 2019-05-21 NOTE — TELEPHONE ENCOUNTER
Panel Management Review      Patient has the following on her problem list:     Depression / Dysthymia review    Measure:  Needs PHQ-9 score of 4 or less during index window.  Administer PHQ-9 and if score is 5 or more, send encounter to provider for next steps.    5 - 7 month window range:     PHQ-9 SCORE 4/1/2013 8/12/2015 5/18/2017   PHQ-9 Total Score 5 - -   PHQ-9 Total Score MyChart - 2 -   PHQ-9 Total Score - - 4       If PHQ-9 recheck is 5 or more, route to provider for next steps.    Patient is due for:  PHQ9 and DAP    Hypertension   Last three blood pressure readings:  BP Readings from Last 3 Encounters:   05/18/17 154/84   12/08/16 131/73   04/26/16 138/78     Blood pressure: FAILED    HTN Guidelines:  Less than 140/90      Composite cancer screening  Chart review shows that this patient is due/due soon for the following Pap Smear and Mammogram  Summary:    Patient is due/failing the following:   BP CHECK, MAMMOGRAM, PAP and PHYSICAL    Action needed:   Patient was to establish care with Internal Medicine due to her complex medical history.     Type of outreach:    none    Questions for provider review:    None                                                                                                                                    Kenn MERCADO MA       Chart routed to  .

## 2019-09-03 ENCOUNTER — OFFICE VISIT (OUTPATIENT)
Dept: SLEEP MEDICINE | Facility: CLINIC | Age: 64
End: 2019-09-03
Payer: COMMERCIAL

## 2019-09-03 VITALS
WEIGHT: 245 LBS | DIASTOLIC BLOOD PRESSURE: 71 MMHG | OXYGEN SATURATION: 95 % | SYSTOLIC BLOOD PRESSURE: 117 MMHG | HEART RATE: 71 BPM | BODY MASS INDEX: 41.83 KG/M2 | HEIGHT: 64 IN

## 2019-09-03 DIAGNOSIS — G47.33 OSA (OBSTRUCTIVE SLEEP APNEA): Primary | ICD-10-CM

## 2019-09-03 PROCEDURE — 99213 OFFICE O/P EST LOW 20 MIN: CPT | Performed by: PHYSICIAN ASSISTANT

## 2019-09-03 ASSESSMENT — MIFFLIN-ST. JEOR: SCORE: 1653.37

## 2019-09-03 NOTE — PATIENT INSTRUCTIONS
Your BMI is Body mass index is 42.72 kg/m .  Weight management is a personal decision.  If you are interested in exploring weight loss strategies, the following discussion covers the approaches that may be successful. Body mass index (BMI) is one way to tell whether you are at a healthy weight, overweight, or obese. It measures your weight in relation to your height.  A BMI of 18.5 to 24.9 is in the healthy range. A person with a BMI of 25 to 29.9 is considered overweight, and someone with a BMI of 30 or greater is considered obese. More than two-thirds of American adults are considered overweight or obese.  Being overweight or obese increases the risk for further weight gain. Excess weight may lead to heart disease and diabetes.  Creating and following plans for healthy eating and physical activity may help you improve your health.  Weight control is part of healthy lifestyle and includes exercise, emotional health, and healthy eating habits. Careful eating habits lifelong are the mainstay of weight control. Though there are significant health benefits from weight loss, long-term weight loss with diet alone may be very difficult to achieve- studies show long-term success with dietary management in less than 10% of people. Attaining a healthy weight may be especially difficult to achieve in those with severe obesity. In some cases, medications, devices and surgical management might be considered.  What can you do?  If you are overweight or obese and are interested in methods for weight loss, you should discuss this with your provider.     Consider reducing daily calorie intake by 500 calories.     Keep a food journal.     Avoiding skipping meals, consider cutting portions instead.    Diet combined with exercise helps maintain muscle while optimizing fat loss. Strength training is particularly important for building and maintaining muscle mass. Exercise helps reduce stress, increase energy, and improves fitness.  Increasing exercise without diet control, however, may not burn enough calories to loose weight.       Start walking three days a week 10-20 minutes at a time    Work towards walking thirty minutes five days a week     Eventually, increase the speed of your walking for 1-2 minutes at time    In addition, we recommend that you review healthy lifestyles and methods for weight loss available through the National Institutes of Health patient information sites:  http://win.niddk.nih.gov/publications/index.htm    And look into health and wellness programs that may be available through your health insurance provider, employer, local community center, or [employer].    Weight management plan: Patient was referred to their PCP to discuss a diet and exercise plan.

## 2019-09-03 NOTE — NURSING NOTE
"Chief Complaint   Patient presents with     CPAP Follow Up       Initial /71   Pulse 71   Ht 1.613 m (5' 3.5\")   Wt 111.1 kg (245 lb)   SpO2 95%   BMI 42.72 kg/m   Estimated body mass index is 42.72 kg/m  as calculated from the following:    Height as of this encounter: 1.613 m (5' 3.5\").    Weight as of this encounter: 111.1 kg (245 lb).    Medication Reconciliation: complete      "

## 2019-09-03 NOTE — PROGRESS NOTES
Obstructive Sleep Apnea - PAP Follow-Up Visit:    Chief Complaint   Patient presents with     CPAP Follow Up       Mary Lou Connelly comes in today for follow-up of their moderate sleep apnea, managed with CPAP.     HST 4/14 (#227)- AHI 20.2, supine AHI 27.9, L02 76%    Overall, she rates the experience with PAP as 10 (0 poor, 10 great). The mask is comfortable.    The mask is not leaking .  She is not snoring with the mask on. She is not having gasp arousals.  She is not having significant oral/nasal dryness. The pressure is comfortable.     Her PAP interface is Nasal Mask.    Bedtime is typically 2200. Usually it takes about 10 min minutes to fall asleep with the mask on. Wake time is typically 0630.  Patient is using PAP therapy 7 hours per night. The patient is usually getting 8 hours of sleep per night.    She does feel rested in the morning.    Total score - Steele: 5 (9/3/2019  2:00 PM)    ANTON Total Score: 1    Respironics  Auto-PAP 5 - 15 cmH2O 30 day usage data:    90% of days with > 4 hours of use. 0/30 days with no use.   Average use 7 hours 8 minutes per day.   CPAP 90% pressure 8.0cm.   AHI 2.2 events per hour.    She reports CPAP is going well. No sleep concerns today.    Past medical/surgical history, family history, social history, medications and allergies were reviewed.      Problem List:  Patient Active Problem List    Diagnosis Date Noted     Fracture of sacrum, unspecified fracture morphology, initial encounter 05/18/2017     Priority: Medium     Hematoma 05/18/2017     Priority: Medium     Other urinary incontinence 05/18/2017     Priority: Medium     Obesity, unspecified obesity severity, unspecified obesity type 04/26/2016     Priority: Medium     Seborrheic keratoses 04/26/2016     Priority: Medium     Choledocholithiasis 05/31/2015     Priority: Medium     Depression 05/31/2015     Priority: Medium     Environmental allergies 05/31/2015     Priority: Medium     Varicose veins with pain  "07/15/2014     Priority: Medium     Bilateral lower extremity edema 07/15/2014     Priority: Medium     Obstructive sleep apnea syndrome 04/08/2014     Priority: Medium     HST 4/14 (#227)- AHI 20.2, supine AHI 27.9, L02 76%       Generalized anxiety disorder 04/01/2013     Priority: Medium     Advanced directives, counseling/discussion 02/05/2013     Priority: Medium     Discussed advance care planning with patient; information given to patient to review. 2/5/2013          Seasonal allergies 08/09/2012     Priority: Medium     Hypertension goal BP (blood pressure) < 140/90 08/09/2012     Priority: Medium     Meniere disease 10/26/2011     Priority: Medium     Vitamin D deficiency 10/26/2011     Priority: Medium     CARDIOVASCULAR SCREENING; LDL GOAL LESS THAN 160 10/31/2010     Priority: Medium     Family history of malignant neoplasm of gastrointestinal tract 05/08/2007     Priority: Medium     Malignant neoplasm of anus (H) 05/08/2007     Priority: Medium        /71   Pulse 71   Ht 1.613 m (5' 3.5\")   Wt 111.1 kg (245 lb)   SpO2 95%   BMI 42.72 kg/m      Impression/Plan:    Moderate Sleep apnea.   Tolerating PAP well. Daytime symptoms are improved.   Continue current treatment.  Comprehensive DME.    Mary Lou Connelly will follow up in about 2 years or sooner if any concerns.    Fifteen minutes spent with patient, all of which were spent face-to-face counseling, consulting, coordinating plan of care regarding TANIA.      Toby Ayala PA-C  "

## 2020-02-23 ENCOUNTER — HEALTH MAINTENANCE LETTER (OUTPATIENT)
Age: 65
End: 2020-02-23

## 2020-05-26 ENCOUNTER — DOCUMENTATION ONLY (OUTPATIENT)
Dept: CARE COORDINATION | Facility: CLINIC | Age: 65
End: 2020-05-26

## 2020-06-22 ENCOUNTER — ANCILLARY PROCEDURE (OUTPATIENT)
Dept: GENERAL RADIOLOGY | Facility: CLINIC | Age: 65
End: 2020-06-22
Attending: ORTHOPAEDIC SURGERY
Payer: COMMERCIAL

## 2020-06-22 ENCOUNTER — OFFICE VISIT (OUTPATIENT)
Dept: ORTHOPEDICS | Facility: CLINIC | Age: 65
End: 2020-06-22
Payer: COMMERCIAL

## 2020-06-22 VITALS — WEIGHT: 206.8 LBS | BODY MASS INDEX: 36.06 KG/M2

## 2020-06-22 DIAGNOSIS — M25.551 HIP PAIN, RIGHT: Primary | ICD-10-CM

## 2020-06-22 DIAGNOSIS — M25.551 HIP PAIN, RIGHT: ICD-10-CM

## 2020-06-22 RX ORDER — MONTELUKAST SODIUM 4 MG/1
2 TABLET, CHEWABLE ORAL 2 TIMES DAILY
COMMUNITY
Start: 2020-06-18

## 2020-06-22 ASSESSMENT — PATIENT HEALTH QUESTIONNAIRE - PHQ9: SUM OF ALL RESPONSES TO PHQ QUESTIONS 1-9: 2

## 2020-06-22 NOTE — NURSING NOTE
Reason For Visit:   Chief Complaint   Patient presents with     Follow Up     Pain, right hip. Discuss surgery.  Patient stated that she had weight loss surgery and has loss approximately 45lbs. Current weight today is 206.8lbs        Pain Assessment  Patient Currently in Pain: Yes  0-10 Pain Scale: 2  Primary Pain Location: Hip(Right)  Pain Descriptors: Constant        Allergies   Allergen Reactions     Nkda [No Known Drug Allergies]            Cande Cottrell LPN

## 2020-06-22 NOTE — LETTER
6/22/2020      RE: Mary Lou Connelly  [address]  [place] [address]    Dear Colleague,    Thank you for referring your patient, Mary Lou Connelly, to the [facility]. Please see a copy of my visit note below.        [facility]  Orthopaedic Oncology Surgery, Joint Replacement Consultation  by Emmanuel Box M.D.    Mary Lou Connelly MRN# [id number]   Age: 62 year old YOB: 1955     Requesting physician: Referred Self  Kehr, Kristen M       Background history:  DX:   1. 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 DJD R hip, ? Post-radiation    TREATMENTS:  1. 22 yrs ago, chemotherapy, Tejinder Rodriguez MD, [place]  2. 22 years ago, radiation therapy to pelvis and left hip, Dr. Real, [facility]             Assessment and Plan:   Assessment:  64-year-old female with advanced right hip osteoarthritis with cystic change on the femoral as well as acetabular side   History of squamous cell cancer anus, status post radiation and chemotherapy.      Plan:  We had a long discussion with the patient regarding ongoing management options.  Reviewed surgical and nonsurgical treatments.  We reviewed total hip replacement in detail including the procedure, the implants, the recovery process, and long-term outcomes.  We reviewed that the risks of the surgery include but are not limited to infection, wound problems, dislocation, persistent pain, leg length discrepancy, loosening, revision surgery.  We also reviewed less common risks such as neurovascular injury fracture, and other implant-related issues.  We reviewed other medical complications such as a blood clot.  We discussed that the vast majority of cases have a highly successful outcome.  However there is a small subset of patients that do experience complications or problems following the hip replacement.  Based on a discussion of the risks and benefits, we believe that the benefits far outweigh the risks at this point and the  patient  would like to proceed with surgery.  We will work on scheduling surgery at a time that works well for them in the next few months.  They will contact us if they have any questions or concerns leading up to surgery.  Given her poor bone quality and history of radiation therapy a cemented total hip arthroplasty would be advised.  TIer 4.    Patient will attend a joint replacement class, visit with her cardiologist, anesthesiologist, dentist and primary care physicians.          History of Present Illness:     62 year old female with history of squamous cell anal cancer status post radiation and chemotherapy approximately 22 years ago, now in remission( Radiation performed by Dr. Real at [facility]. who presented for evaluation for right total hip arthroplasty in 2018. Of note, she has suffered insufficiency fractures of her bilateral superior and inferior pubic rami as well as her coccyx. These fractures were all sustained during minor events such as lifting her grandchildren, or rising from a chair. She is diagnosed with osteopenia and currently on Fosamax as well as vitamin D supplementation.     At that time the plan was to continue with weight loss. The decision to move forward with treatment will be dependent upon her pain and loss in function rather than solely on radiographic appearance and to follow up in a year or sooner if there are any changes.    In the interim patient has undergone a gastric sleeve bariatric surgery at [facility] in February of this year.  She has lost over 40 pounds.  In spite of this her pain of the right hip continues.  She is here to talk about possible right total hip arthroplasty.  She is experiencing night pain and initiation pain.  She ambulates with the help of a walker in and around the house.  Because of her recent bariatric surgery she no longer is able to use NSAIDs.  To mitigate the pain she uses Tylenol.  She has not received any recent intra-articular  injections.  Patient lives together with her  and daughter and has to walk stairs.  She works as an .    During her postop bariatric surgery she had a brief episode of atrial fibrillation which spontaneously converted to sinus rhythm.  She still needs to follow-up with a cardiologist to further evaluate this.           Physical Exam:      EXAMINATION pertinent findings:   VITAL SIGNS: There were no vitals taken for this visit.  Body mass index is 36.06 kg/m .  PSYCH: Pleasant, healthy-appearing, alert, oriented x3, cooperative. Normal mood and affect.  RESP: non labored breathing   ABD: benign, soft, non-tender, no acute peritoneal signs.   SKIN: grossly normal   LYMPHATIC: grossly normal   NEURO: grossly normal   VASCULAR: satisfactory perfusion of all extremities   MUSCULOSKELETAL:   Cautious gait with shortened stance over the right lower extremity.  Flexion/extension 90 degrees with an extension deficit of 5 degrees.  Strongly decreased rotations.  Neurovascularly intact.           Data:   All laboratory data reviewed  All imaging studies reviewed by me    XR Pelvis with Right Hip 1 View:    Severe osteoarthritic changes of the right hip with protrusion.  Medial wall intact.  Status post bilateral ramus superior and inferior fractures.      Pedro Araya MD  N Arthroplasty Fellow    Total Time = 25 min, 50% of which was spent in counseling and coordination of care as documented above.    Again, thank you for allowing me to participate in the care of your patient.      Sincerely,      Emmanuel Box MD

## 2020-06-22 NOTE — NURSING NOTE
Teaching Flowsheet   Relevant Diagnosis: osteoarthritis right hip,   Teaching Topic: Right total hip arthroplasty     Person(s) involved in teaching:   Patient     Motivation Level:  Asks Questions: Yes  Eager to Learn: Yes  Cooperative: Yes  Receptive (willing/able to accept information): Yes  Any cultural factors/Zoroastrian beliefs that may influence understanding or compliance? No       Patient demonstrates understanding of the following:  Reason for the appointment, diagnosis and treatment plan: Yes  Knowledge of proper use of medications and conditions for which they are ordered (with special attention to potential side effects or drug interactions): Yes  Which situations necessitate calling provider and whom to contact: Yes       Teaching Concerns Addressed:     Total joint class, booklet, dental work, cardiologist appointment, Preop physical, COVID 19 testing, Type and Screen. ABX in future     Proper use and care of cane (medical equip, care aids, etc.): NA  Nutritional needs and diet plan: NA  Pain management techniques: NA  Wound Care: Yes  How and/when to access community resources: Yes     Instructional Materials Used/Given: surgical pamphlet and joint replacement book to mail to patient.     Time spent with patient: 15 minutes.

## 2020-06-22 NOTE — PROGRESS NOTES
[facility]  Orthopaedic Oncology Surgery, Joint Replacement Consultation  by Emmanuel Box M.D.    Mary Lou Connelly MRN# [id number]   Age: 62 year old YOB: 1955     Requesting physician: Referred Self Kehr, Kristen M       Background history:  DX:   1. 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 DJD R hip, ? Post-radiation    TREATMENTS:  1. 22 yrs ago, chemotherapy, Tejinder Rodriguez MD, [facility]  2. 22 years ago, radiation therapy to pelvis and left hip[place] [facility]             Assessment and Plan:   Assessment:  64-year-old female with advanced right hip osteoarthritis with cystic change on the femoral as well as acetabular side   History of squamous cell cancer anus, status post radiation and chemotherapy.      Plan:  We had a long discussion with the patient regarding ongoing management options.  Reviewed surgical and nonsurgical treatments.  We reviewed total hip replacement in detail including the procedure, the implants, the recovery process, and long-term outcomes.  We reviewed that the risks of the surgery include but are not limited to infection, wound problems, dislocation, persistent pain, leg length discrepancy, loosening, revision surgery.  We also reviewed less common risks such as neurovascular injury fracture, and other implant-related issues.  We reviewed other medical complications such as a blood clot.  We discussed that the vast majority of cases have a highly successful outcome.  However there is a small subset of patients that do experience complications or problems following the hip replacement.  Based on a discussion of the risks and benefits, we believe that the benefits far outweigh the risks at this point and the patient  would like to proceed with surgery.  We will work on scheduling surgery at a time that works well for them in the next few months.  They will contact us if they have any questions or concerns leading up to surgery.  Given her poor  bone quality and history of radiation therapy a cemented total hip arthroplasty would be advised.  TIer 4.    Patient will attend a joint replacement class, visit with her cardiologist, anesthesiologist, dentist and primary care physicians.          History of Present Illness:     62 year old female with history of squamous cell anal cancer status post radiation and chemotherapy approximately 22 years ago, now in remission( Radiation performed by Dr. Real at [facility]. who presented for evaluation for right total hip arthroplasty in 2018. Of note, she has suffered insufficiency fractures of her bilateral superior and inferior pubic rami as well as her coccyx. These fractures were all sustained during minor events such as lifting her grandchildren, or rising from a chair. She is diagnosed with osteopenia and currently on Fosamax as well as vitamin D supplementation.     At that time the plan was to continue with weight loss. The decision to move forward with treatment will be dependent upon her pain and loss in function rather than solely on radiographic appearance and to follow up in a year or sooner if there are any changes.    In the interim patient has undergone a gastric sleeve bariatric surgery at [facility] in February of this year.  She has lost over 40 pounds.  In spite of this her pain of the right hip continues.  She is here to talk about possible right total hip arthroplasty.  She is experiencing night pain and initiation pain.  She ambulates with the help of a walker in and around the house.  Because of her recent bariatric surgery she no longer is able to use NSAIDs.  To mitigate the pain she uses Tylenol.  She has not received any recent intra-articular injections.  Patient lives together with her  and daughter and has to walk stairs.  She works as an .    During her postop bariatric surgery she had a brief episode of atrial fibrillation which spontaneously converted to  sinus rhythm.  She still needs to follow-up with a cardiologist to further evaluate this.           Physical Exam:      EXAMINATION pertinent findings:   VITAL SIGNS: There were no vitals taken for this visit.  Body mass index is 36.06 kg/m .  PSYCH: Pleasant, healthy-appearing, alert, oriented x3, cooperative. Normal mood and affect.  RESP: non labored breathing   ABD: benign, soft, non-tender, no acute peritoneal signs.   SKIN: grossly normal   LYMPHATIC: grossly normal   NEURO: grossly normal   VASCULAR: satisfactory perfusion of all extremities   MUSCULOSKELETAL:   Cautious gait with shortened stance over the right lower extremity.  Flexion/extension 90 degrees with an extension deficit of 5 degrees.  Strongly decreased rotations.  Neurovascularly intact.           Data:   All laboratory data reviewed  All imaging studies reviewed by me    XR Pelvis with Right Hip 1 View:    Severe osteoarthritic changes of the right hip with protrusion.  Medial wall intact.  Status post bilateral ramus superior and inferior fractures.      Pedro Araya MD  N Arthroplasty Fellow    Total Time = 25 min, 50% of which was spent in counseling and coordination of care as documented above.

## 2020-08-05 ENCOUNTER — TELEPHONE (OUTPATIENT)
Dept: ORTHOPEDICS | Facility: CLINIC | Age: 65
End: 2020-08-05

## 2020-08-05 DIAGNOSIS — M16.7 OTHER SECONDARY OSTEOARTHRITIS OF RIGHT HIP: Primary | ICD-10-CM

## 2020-08-05 NOTE — TELEPHONE ENCOUNTER
[facility] Call Center    Phone Message    May a detailed message be left on voicemail: yes     Reason for Call: Other: Pt would like a call back to discuss surgery dates as well as the class she has to take as she never recieved the packet that was suppose to be mailed out. PLease reach out to pt to discuss     Action Taken: Message routed to:  [facility] ([facility] Ortho    Travel Screening: Not Applicable

## 2020-08-07 ENCOUNTER — TELEPHONE (OUTPATIENT)
Dept: ORTHOPEDICS | Facility: CLINIC | Age: 65
End: 2020-08-07

## 2020-08-07 ENCOUNTER — PREP FOR PROCEDURE (OUTPATIENT)
Dept: ORTHOPEDICS | Facility: CLINIC | Age: 65
End: 2020-08-07

## 2020-08-07 DIAGNOSIS — M16.7 OTHER SECONDARY OSTEOARTHRITIS OF RIGHT HIP: Primary | ICD-10-CM

## 2020-08-07 NOTE — TELEPHONE ENCOUNTER
This writer called the patient back.  New surgery pamphlet being mailed.  Sept. Surgery date 9/22 - message sent to Li. Ok'd by the patient.     We reviewed the items need to complete - COVID 19 testing (2-3 days prior), dental work, Preop H & P with PCP to schedule. Type and Screen.    Fern Soto RN on 8/7/2020 at 2:40 PM

## 2020-08-12 DIAGNOSIS — Z11.59 ENCOUNTER FOR SCREENING FOR OTHER VIRAL DISEASES: Primary | ICD-10-CM

## 2020-08-12 PROBLEM — M16.7 OTHER SECONDARY OSTEOARTHRITIS OF RIGHT HIP: Status: ACTIVE | Noted: 2020-08-12

## 2020-09-14 DIAGNOSIS — G47.33 OBSTRUCTIVE SLEEP APNEA (ADULT) (PEDIATRIC): Primary | ICD-10-CM

## 2020-09-16 ENCOUNTER — TELEPHONE (OUTPATIENT)
Dept: ORTHOPEDICS | Facility: CLINIC | Age: 65
End: 2020-09-16

## 2020-09-16 NOTE — PROGRESS NOTES
SURGERY PLAN (PRE-OP PLAN)     Patient Position (indicated by x):  X  Lateral decubitus, Wixson hip positioner   x  Regular OR table   x  Viveros catheter   X  Revision LACEY drape with plastic side bags for leg   X  Blue U drape x2   Blue and white stockinet   Coban   Ioban      LACEY Requests (indicated by x):  x  Echo BiMetric stems (Back table - unopened)   X  Biomet TAPERLOC ingrowth stem      Biomet Integral cemented stem   X  Biomet RB cup, 32 heads     G7 cup      Biomet Bipolar cup      Specimens and cultures (indicated by x):      Tissue cultures, aerobic and anaerobic without gram stain      Frozen section      pathology specimens - fresh      pathology specimens - formalin      Background: (BMI 36) Hx radiation to pelvis for squamous cell cancer of the anus 22 years ago. She has suffered insufficiency fractures of her bilateral superior and inferior pubic rami as well as her coccyx. These fractures were all sustained during minor events such as lifting her grandchildren, or rising from a chair. She is diagnosed with osteopenia and currently on Fosamax as well as vitamin D supplementation. During her postop bariatric surgery she had a brief episode of atrial fibrillation which spontaneously converted to sinus rhythm.  She still needs to follow-up with a cardiologist to further evaluate this.    Plan: Primary right LACEY    Delonte Souza DO  Adult Joint Reconstruction Fellow  Dept Orthopaedic Surgery, [facility]  [phone] Pager [phone] Office

## 2020-09-16 NOTE — TELEPHONE ENCOUNTER
RN called and left a detailed VM for patient. RN wants to know if patient has seen her cardiologist to get a clearance for her upcoming surgery with Dr. Box  RN also told her if she had, she should get the result faxed to us.     Smita Mahajan RN

## 2020-09-18 DIAGNOSIS — Z11.59 ENCOUNTER FOR SCREENING FOR OTHER VIRAL DISEASES: ICD-10-CM

## 2020-09-18 PROCEDURE — U0003 INFECTIOUS AGENT DETECTION BY NUCLEIC ACID (DNA OR RNA); SEVERE ACUTE RESPIRATORY SYNDROME CORONAVIRUS 2 (SARS-COV-2) (CORONAVIRUS DISEASE [COVID-19]), AMPLIFIED PROBE TECHNIQUE, MAKING USE OF HIGH THROUGHPUT TECHNOLOGIES AS DESCRIBED BY CMS-2020-01-R: HCPCS | Performed by: ORTHOPAEDIC SURGERY

## 2020-09-18 RX ORDER — ASPIRIN 81 MG/1
81 TABLET, CHEWABLE ORAL DAILY
Status: ON HOLD | COMMUNITY
End: 2020-09-23

## 2020-09-19 LAB
SARS-COV-2 RNA SPEC QL NAA+PROBE: NOT DETECTED
SPECIMEN SOURCE: NORMAL

## 2020-09-21 ENCOUNTER — ANESTHESIA EVENT (OUTPATIENT)
Dept: SURGERY | Facility: CLINIC | Age: 65
End: 2020-09-21
Payer: COMMERCIAL

## 2020-09-22 ENCOUNTER — HOSPITAL ENCOUNTER (INPATIENT)
Facility: CLINIC | Age: 65
LOS: 1 days | Discharge: HOME | End: 2020-09-23
Attending: ORTHOPAEDIC SURGERY | Admitting: ORTHOPAEDIC SURGERY
Payer: COMMERCIAL

## 2020-09-22 ENCOUNTER — APPOINTMENT (OUTPATIENT)
Dept: GENERAL RADIOLOGY | Facility: CLINIC | Age: 65
End: 2020-09-22
Attending: ORTHOPAEDIC SURGERY
Payer: COMMERCIAL

## 2020-09-22 ENCOUNTER — ANESTHESIA (OUTPATIENT)
Dept: SURGERY | Facility: CLINIC | Age: 65
End: 2020-09-22
Payer: COMMERCIAL

## 2020-09-22 DIAGNOSIS — Z98.890 STATUS POST HIP SURGERY: Primary | ICD-10-CM

## 2020-09-22 DIAGNOSIS — M16.7 OTHER SECONDARY OSTEOARTHRITIS OF RIGHT HIP: ICD-10-CM

## 2020-09-22 LAB
ABO + RH BLD: NORMAL
ABO + RH BLD: NORMAL
BLD GP AB SCN SERPL QL: NORMAL
BLD PROD TYP BPU: NORMAL
BLOOD BANK CMNT PATIENT-IMP: NORMAL
CREAT SERPL-MCNC: 0.83 MG/DL (ref 0.52–1.04)
GFR SERPL CREATININE-BSD FRML MDRD: 76 ML/MIN/{1.73_M2}
GLUCOSE SERPL-MCNC: 88 MG/DL (ref 70–99)
NUM BPU REQUESTED: 1
POTASSIUM SERPL-SCNC: 4 MMOL/L (ref 3.4–5.3)
SPECIMEN EXP DATE BLD: NORMAL
TROPONIN I SERPL-MCNC: <0.015 UG/L (ref 0–0.04)

## 2020-09-22 PROCEDURE — 25800030 ZZH RX IP 258 OP 636: Performed by: ORTHOPAEDIC SURGERY

## 2020-09-22 PROCEDURE — 99222 1ST HOSP IP/OBS MODERATE 55: CPT | Performed by: INTERNAL MEDICINE

## 2020-09-22 PROCEDURE — 82947 ASSAY GLUCOSE BLOOD QUANT: CPT | Performed by: ANESTHESIOLOGY

## 2020-09-22 PROCEDURE — 25000132 ZZH RX MED GY IP 250 OP 250 PS 637: Performed by: STUDENT IN AN ORGANIZED HEALTH CARE EDUCATION/TRAINING PROGRAM

## 2020-09-22 PROCEDURE — 25800030 ZZH RX IP 258 OP 636: Performed by: STUDENT IN AN ORGANIZED HEALTH CARE EDUCATION/TRAINING PROGRAM

## 2020-09-22 PROCEDURE — 25000128 H RX IP 250 OP 636: Performed by: ANESTHESIOLOGY

## 2020-09-22 PROCEDURE — 25000128 H RX IP 250 OP 636: Performed by: STUDENT IN AN ORGANIZED HEALTH CARE EDUCATION/TRAINING PROGRAM

## 2020-09-22 PROCEDURE — 36415 COLL VENOUS BLD VENIPUNCTURE: CPT | Performed by: ANESTHESIOLOGY

## 2020-09-22 PROCEDURE — 25000132 ZZH RX MED GY IP 250 OP 250 PS 637: Performed by: NURSE ANESTHETIST, CERTIFIED REGISTERED

## 2020-09-22 PROCEDURE — 0SR902A REPLACEMENT OF RIGHT HIP JOINT WITH METAL ON POLYETHYLENE SYNTHETIC SUBSTITUTE, UNCEMENTED, OPEN APPROACH: ICD-10-PCS | Performed by: ORTHOPAEDIC SURGERY

## 2020-09-22 PROCEDURE — 25000132 ZZH RX MED GY IP 250 OP 250 PS 637: Performed by: ORTHOPAEDIC SURGERY

## 2020-09-22 PROCEDURE — 86923 COMPATIBILITY TEST ELECTRIC: CPT | Performed by: ANESTHESIOLOGY

## 2020-09-22 PROCEDURE — 40000170 ZZH STATISTIC PRE-PROCEDURE ASSESSMENT II: Performed by: ORTHOPAEDIC SURGERY

## 2020-09-22 PROCEDURE — 12000001 ZZH R&B MED SURG/OB UMMC

## 2020-09-22 PROCEDURE — C1713 ANCHOR/SCREW BN/BN,TIS/BN: HCPCS | Performed by: ORTHOPAEDIC SURGERY

## 2020-09-22 PROCEDURE — 93010 ELECTROCARDIOGRAM REPORT: CPT | Performed by: INTERNAL MEDICINE

## 2020-09-22 PROCEDURE — 84484 ASSAY OF TROPONIN QUANT: CPT | Performed by: ANESTHESIOLOGY

## 2020-09-22 PROCEDURE — 25000132 ZZH RX MED GY IP 250 OP 250 PS 637: Performed by: INTERNAL MEDICINE

## 2020-09-22 PROCEDURE — 40000986 XR PELVIS PORT 1/2 VW

## 2020-09-22 PROCEDURE — 25000125 ZZHC RX 250: Performed by: STUDENT IN AN ORGANIZED HEALTH CARE EDUCATION/TRAINING PROGRAM

## 2020-09-22 PROCEDURE — 93005 ELECTROCARDIOGRAM TRACING: CPT

## 2020-09-22 PROCEDURE — 71000014 ZZH RECOVERY PHASE 1 LEVEL 2 FIRST HR: Performed by: ORTHOPAEDIC SURGERY

## 2020-09-22 PROCEDURE — 84132 ASSAY OF SERUM POTASSIUM: CPT | Performed by: ANESTHESIOLOGY

## 2020-09-22 PROCEDURE — C1776 JOINT DEVICE (IMPLANTABLE): HCPCS | Performed by: ORTHOPAEDIC SURGERY

## 2020-09-22 PROCEDURE — 25000125 ZZHC RX 250: Performed by: ANESTHESIOLOGY

## 2020-09-22 PROCEDURE — 40000986 XR PELVIS AND HIP PORTABLE RIGHT 2 VIEWS

## 2020-09-22 PROCEDURE — 82565 ASSAY OF CREATININE: CPT | Performed by: ANESTHESIOLOGY

## 2020-09-22 PROCEDURE — 36000062 ZZH SURGERY LEVEL 4 1ST 30 MIN - UMMC: Performed by: ORTHOPAEDIC SURGERY

## 2020-09-22 PROCEDURE — 25000565 ZZH ISOFLURANE, EA 15 MIN: Performed by: ORTHOPAEDIC SURGERY

## 2020-09-22 PROCEDURE — 25000128 H RX IP 250 OP 636: Performed by: ORTHOPAEDIC SURGERY

## 2020-09-22 PROCEDURE — 25000128 H RX IP 250 OP 636: Performed by: NURSE ANESTHETIST, CERTIFIED REGISTERED

## 2020-09-22 PROCEDURE — 0QU407Z SUPPLEMENT RIGHT ACETABULUM WITH AUTOLOGOUS TISSUE SUBSTITUTE, OPEN APPROACH: ICD-10-PCS | Performed by: ORTHOPAEDIC SURGERY

## 2020-09-22 PROCEDURE — 40000275 ZZH STATISTIC RCP TIME EA 10 MIN

## 2020-09-22 PROCEDURE — 25800030 ZZH RX IP 258 OP 636: Performed by: ANESTHESIOLOGY

## 2020-09-22 PROCEDURE — 86900 BLOOD TYPING SEROLOGIC ABO: CPT | Performed by: ANESTHESIOLOGY

## 2020-09-22 PROCEDURE — 37000009 ZZH ANESTHESIA TECHNICAL FEE, EACH ADDTL 15 MIN: Performed by: ORTHOPAEDIC SURGERY

## 2020-09-22 PROCEDURE — 27210794 ZZH OR GENERAL SUPPLY STERILE: Performed by: ORTHOPAEDIC SURGERY

## 2020-09-22 PROCEDURE — 86850 RBC ANTIBODY SCREEN: CPT | Performed by: ANESTHESIOLOGY

## 2020-09-22 PROCEDURE — 37000008 ZZH ANESTHESIA TECHNICAL FEE, 1ST 30 MIN: Performed by: ORTHOPAEDIC SURGERY

## 2020-09-22 PROCEDURE — 36000064 ZZH SURGERY LEVEL 4 EA 15 ADDTL MIN - UMMC: Performed by: ORTHOPAEDIC SURGERY

## 2020-09-22 PROCEDURE — 84484 ASSAY OF TROPONIN QUANT: CPT | Performed by: STUDENT IN AN ORGANIZED HEALTH CARE EDUCATION/TRAINING PROGRAM

## 2020-09-22 PROCEDURE — 99207 ZZC CONSULT E&M CHANGED TO INITIAL LEVEL: CPT | Performed by: INTERNAL MEDICINE

## 2020-09-22 PROCEDURE — 86901 BLOOD TYPING SEROLOGIC RH(D): CPT | Performed by: ANESTHESIOLOGY

## 2020-09-22 DEVICE — IMPLANTABLE DEVICE
Type: IMPLANTABLE DEVICE | Site: HIP | Status: FUNCTIONAL
Brand: RINGLOC RANAWAT / BURSTEIN HIP SYSTEM

## 2020-09-22 DEVICE — IMPLANTABLE DEVICE
Type: IMPLANTABLE DEVICE | Site: FEMUR | Status: FUNCTIONAL
Brand: TAPERLOC COMPLETE PRIMARY FEMORAL

## 2020-09-22 DEVICE — IMP HEAD FEMORAL BIOM MOD 36MM -3  11-363661: Type: IMPLANTABLE DEVICE | Site: HIP | Status: FUNCTIONAL

## 2020-09-22 DEVICE — IMPLANTABLE DEVICE
Type: IMPLANTABLE DEVICE | Site: HIP | Status: FUNCTIONAL
Brand: RINGLOC® HIP SYSTEM

## 2020-09-22 RX ORDER — FENTANYL CITRATE 50 UG/ML
25-50 INJECTION, SOLUTION INTRAMUSCULAR; INTRAVENOUS
Status: DISCONTINUED | OUTPATIENT
Start: 2020-09-22 | End: 2020-09-22 | Stop reason: HOSPADM

## 2020-09-22 RX ORDER — ACETAMINOPHEN 500 MG
500-1000 TABLET ORAL 2 TIMES DAILY
Status: ON HOLD | COMMUNITY
End: 2020-09-23

## 2020-09-22 RX ORDER — DEXAMETHASONE SODIUM PHOSPHATE 4 MG/ML
INJECTION, SOLUTION INTRA-ARTICULAR; INTRALESIONAL; INTRAMUSCULAR; INTRAVENOUS; SOFT TISSUE PRN
Status: DISCONTINUED | OUTPATIENT
Start: 2020-09-22 | End: 2020-09-22

## 2020-09-22 RX ORDER — HYDROMORPHONE HCL/0.9% NACL/PF 0.2MG/0.2
0.2 SYRINGE (ML) INTRAVENOUS
Status: DISCONTINUED | OUTPATIENT
Start: 2020-09-22 | End: 2020-09-23 | Stop reason: HOSPADM

## 2020-09-22 RX ORDER — NALOXONE HYDROCHLORIDE 0.4 MG/ML
.1-.4 INJECTION, SOLUTION INTRAMUSCULAR; INTRAVENOUS; SUBCUTANEOUS
Status: DISCONTINUED | OUTPATIENT
Start: 2020-09-22 | End: 2020-09-23 | Stop reason: HOSPADM

## 2020-09-22 RX ORDER — ONDANSETRON 2 MG/ML
4 INJECTION INTRAMUSCULAR; INTRAVENOUS EVERY 30 MIN PRN
Status: DISCONTINUED | OUTPATIENT
Start: 2020-09-22 | End: 2020-09-22 | Stop reason: HOSPADM

## 2020-09-22 RX ORDER — AMOXICILLIN 250 MG
2 CAPSULE ORAL 2 TIMES DAILY
Status: DISCONTINUED | OUTPATIENT
Start: 2020-09-22 | End: 2020-09-23 | Stop reason: HOSPADM

## 2020-09-22 RX ORDER — ACETAMINOPHEN 325 MG/1
975 TABLET ORAL ONCE
Status: DISCONTINUED | OUTPATIENT
Start: 2020-09-22 | End: 2020-09-22 | Stop reason: HOSPADM

## 2020-09-22 RX ORDER — ASPIRIN 81 MG/1
162 TABLET ORAL DAILY
Status: DISCONTINUED | OUTPATIENT
Start: 2020-09-23 | End: 2020-09-23 | Stop reason: HOSPADM

## 2020-09-22 RX ORDER — ESMOLOL HYDROCHLORIDE 10 MG/ML
INJECTION INTRAVENOUS PRN
Status: DISCONTINUED | OUTPATIENT
Start: 2020-09-22 | End: 2020-09-22

## 2020-09-22 RX ORDER — FENTANYL CITRATE 50 UG/ML
INJECTION, SOLUTION INTRAMUSCULAR; INTRAVENOUS PRN
Status: DISCONTINUED | OUTPATIENT
Start: 2020-09-22 | End: 2020-09-22

## 2020-09-22 RX ORDER — ONDANSETRON 2 MG/ML
INJECTION INTRAMUSCULAR; INTRAVENOUS PRN
Status: DISCONTINUED | OUTPATIENT
Start: 2020-09-22 | End: 2020-09-22

## 2020-09-22 RX ORDER — CEFAZOLIN SODIUM 1 G/3ML
1 INJECTION, POWDER, FOR SOLUTION INTRAMUSCULAR; INTRAVENOUS SEE ADMIN INSTRUCTIONS
Status: DISCONTINUED | OUTPATIENT
Start: 2020-09-22 | End: 2020-09-22 | Stop reason: HOSPADM

## 2020-09-22 RX ORDER — ONDANSETRON 2 MG/ML
4 INJECTION INTRAMUSCULAR; INTRAVENOUS EVERY 6 HOURS PRN
Status: DISCONTINUED | OUTPATIENT
Start: 2020-09-22 | End: 2020-09-23 | Stop reason: HOSPADM

## 2020-09-22 RX ORDER — VENLAFAXINE 37.5 MG/1
37.5 TABLET ORAL 2 TIMES DAILY
Status: DISCONTINUED | OUTPATIENT
Start: 2020-09-22 | End: 2020-09-23 | Stop reason: HOSPADM

## 2020-09-22 RX ORDER — LIDOCAINE HYDROCHLORIDE 20 MG/ML
INJECTION, SOLUTION INFILTRATION; PERINEURAL PRN
Status: DISCONTINUED | OUTPATIENT
Start: 2020-09-22 | End: 2020-09-22

## 2020-09-22 RX ORDER — ALBUTEROL SULFATE 90 UG/1
AEROSOL, METERED RESPIRATORY (INHALATION) PRN
Status: DISCONTINUED | OUTPATIENT
Start: 2020-09-22 | End: 2020-09-22

## 2020-09-22 RX ORDER — FENTANYL CITRATE 50 UG/ML
25-50 INJECTION, SOLUTION INTRAMUSCULAR; INTRAVENOUS EVERY 5 MIN PRN
Status: DISCONTINUED | OUTPATIENT
Start: 2020-09-22 | End: 2020-09-22 | Stop reason: HOSPADM

## 2020-09-22 RX ORDER — PROPOFOL 10 MG/ML
INJECTION, EMULSION INTRAVENOUS CONTINUOUS PRN
Status: DISCONTINUED | OUTPATIENT
Start: 2020-09-22 | End: 2020-09-22

## 2020-09-22 RX ORDER — OXYCODONE HYDROCHLORIDE 5 MG/1
5-10 TABLET ORAL
Status: DISCONTINUED | OUTPATIENT
Start: 2020-09-22 | End: 2020-09-23 | Stop reason: HOSPADM

## 2020-09-22 RX ORDER — PROPOFOL 10 MG/ML
INJECTION, EMULSION INTRAVENOUS PRN
Status: DISCONTINUED | OUTPATIENT
Start: 2020-09-22 | End: 2020-09-22

## 2020-09-22 RX ORDER — CEFAZOLIN SODIUM 2 G/100ML
2 INJECTION, SOLUTION INTRAVENOUS
Status: COMPLETED | OUTPATIENT
Start: 2020-09-22 | End: 2020-09-22

## 2020-09-22 RX ORDER — HYDROXYZINE HYDROCHLORIDE 10 MG/1
10 TABLET, FILM COATED ORAL EVERY 6 HOURS PRN
Status: DISCONTINUED | OUTPATIENT
Start: 2020-09-22 | End: 2020-09-23 | Stop reason: HOSPADM

## 2020-09-22 RX ORDER — VENLAFAXINE HYDROCHLORIDE 37.5 MG/1
37.5 CAPSULE, EXTENDED RELEASE ORAL DAILY
Status: DISCONTINUED | OUTPATIENT
Start: 2020-09-22 | End: 2020-09-22

## 2020-09-22 RX ORDER — HYDROMORPHONE HYDROCHLORIDE 1 MG/ML
.3-.5 INJECTION, SOLUTION INTRAMUSCULAR; INTRAVENOUS; SUBCUTANEOUS EVERY 5 MIN PRN
Status: DISCONTINUED | OUTPATIENT
Start: 2020-09-22 | End: 2020-09-22 | Stop reason: HOSPADM

## 2020-09-22 RX ORDER — SODIUM CHLORIDE, SODIUM LACTATE, POTASSIUM CHLORIDE, CALCIUM CHLORIDE 600; 310; 30; 20 MG/100ML; MG/100ML; MG/100ML; MG/100ML
INJECTION, SOLUTION INTRAVENOUS CONTINUOUS
Status: DISCONTINUED | OUTPATIENT
Start: 2020-09-22 | End: 2020-09-22 | Stop reason: HOSPADM

## 2020-09-22 RX ORDER — MEPERIDINE HYDROCHLORIDE 25 MG/ML
12.5 INJECTION INTRAMUSCULAR; INTRAVENOUS; SUBCUTANEOUS
Status: DISCONTINUED | OUTPATIENT
Start: 2020-09-22 | End: 2020-09-22 | Stop reason: HOSPADM

## 2020-09-22 RX ORDER — EPHEDRINE SULFATE 50 MG/ML
INJECTION, SOLUTION INTRAMUSCULAR; INTRAVENOUS; SUBCUTANEOUS PRN
Status: DISCONTINUED | OUTPATIENT
Start: 2020-09-22 | End: 2020-09-22

## 2020-09-22 RX ORDER — GLYCOPYRROLATE 0.2 MG/ML
INJECTION, SOLUTION INTRAMUSCULAR; INTRAVENOUS PRN
Status: DISCONTINUED | OUTPATIENT
Start: 2020-09-22 | End: 2020-09-22

## 2020-09-22 RX ORDER — MONTELUKAST SODIUM 4 MG/1
2 TABLET, CHEWABLE ORAL 2 TIMES DAILY
Status: DISCONTINUED | OUTPATIENT
Start: 2020-09-22 | End: 2020-09-23 | Stop reason: HOSPADM

## 2020-09-22 RX ORDER — BISACODYL 10 MG
10 SUPPOSITORY, RECTAL RECTAL DAILY PRN
Status: DISCONTINUED | OUTPATIENT
Start: 2020-09-22 | End: 2020-09-23 | Stop reason: HOSPADM

## 2020-09-22 RX ORDER — NALOXONE HYDROCHLORIDE 0.4 MG/ML
.1-.4 INJECTION, SOLUTION INTRAMUSCULAR; INTRAVENOUS; SUBCUTANEOUS
Status: DISCONTINUED | OUTPATIENT
Start: 2020-09-22 | End: 2020-09-22 | Stop reason: HOSPADM

## 2020-09-22 RX ORDER — LORATADINE 10 MG/1
10 TABLET ORAL DAILY
Status: DISCONTINUED | OUTPATIENT
Start: 2020-09-23 | End: 2020-09-23 | Stop reason: HOSPADM

## 2020-09-22 RX ORDER — SODIUM CHLORIDE, SODIUM LACTATE, POTASSIUM CHLORIDE, CALCIUM CHLORIDE 600; 310; 30; 20 MG/100ML; MG/100ML; MG/100ML; MG/100ML
INJECTION, SOLUTION INTRAVENOUS CONTINUOUS
Status: DISCONTINUED | OUTPATIENT
Start: 2020-09-22 | End: 2020-09-23 | Stop reason: HOSPADM

## 2020-09-22 RX ORDER — POLYETHYLENE GLYCOL 3350 17 G/17G
17 POWDER, FOR SOLUTION ORAL DAILY
Status: DISCONTINUED | OUTPATIENT
Start: 2020-09-22 | End: 2020-09-23 | Stop reason: HOSPADM

## 2020-09-22 RX ORDER — NALOXONE HYDROCHLORIDE 0.4 MG/ML
.1-.4 INJECTION, SOLUTION INTRAMUSCULAR; INTRAVENOUS; SUBCUTANEOUS
Status: DISCONTINUED | OUTPATIENT
Start: 2020-09-22 | End: 2020-09-23

## 2020-09-22 RX ORDER — CEFAZOLIN SODIUM 2 G/100ML
2 INJECTION, SOLUTION INTRAVENOUS EVERY 8 HOURS
Status: COMPLETED | OUTPATIENT
Start: 2020-09-22 | End: 2020-09-23

## 2020-09-22 RX ORDER — TRANEXAMIC ACID 650 MG/1
1950 TABLET ORAL ONCE
Status: COMPLETED | OUTPATIENT
Start: 2020-09-22 | End: 2020-09-22

## 2020-09-22 RX ORDER — ONDANSETRON 4 MG/1
4 TABLET, ORALLY DISINTEGRATING ORAL EVERY 6 HOURS PRN
Status: DISCONTINUED | OUTPATIENT
Start: 2020-09-22 | End: 2020-09-23 | Stop reason: HOSPADM

## 2020-09-22 RX ORDER — LIDOCAINE 40 MG/G
CREAM TOPICAL
Status: DISCONTINUED | OUTPATIENT
Start: 2020-09-22 | End: 2020-09-23 | Stop reason: HOSPADM

## 2020-09-22 RX ORDER — BUPIVACAINE HYDROCHLORIDE 5 MG/ML
INJECTION, SOLUTION EPIDURAL; INTRACAUDAL PRN
Status: DISCONTINUED | OUTPATIENT
Start: 2020-09-22 | End: 2020-09-22

## 2020-09-22 RX ORDER — LABETALOL HYDROCHLORIDE 5 MG/ML
10 INJECTION, SOLUTION INTRAVENOUS
Status: DISCONTINUED | OUTPATIENT
Start: 2020-09-22 | End: 2020-09-22 | Stop reason: HOSPADM

## 2020-09-22 RX ORDER — ONDANSETRON 4 MG/1
4 TABLET, ORALLY DISINTEGRATING ORAL EVERY 30 MIN PRN
Status: DISCONTINUED | OUTPATIENT
Start: 2020-09-22 | End: 2020-09-22 | Stop reason: HOSPADM

## 2020-09-22 RX ORDER — ACETAMINOPHEN 325 MG/1
975 TABLET ORAL EVERY 8 HOURS
Status: DISCONTINUED | OUTPATIENT
Start: 2020-09-22 | End: 2020-09-23 | Stop reason: HOSPADM

## 2020-09-22 RX ORDER — ACETAMINOPHEN 325 MG/1
650 TABLET ORAL EVERY 4 HOURS PRN
Status: DISCONTINUED | OUTPATIENT
Start: 2020-09-25 | End: 2020-09-23 | Stop reason: HOSPADM

## 2020-09-22 RX ADMIN — LIDOCAINE HYDROCHLORIDE 80 MG: 20 INJECTION, SOLUTION INFILTRATION; PERINEURAL at 12:10

## 2020-09-22 RX ADMIN — DEXAMETHASONE SODIUM PHOSPHATE 8 MG: 4 INJECTION, SOLUTION INTRAMUSCULAR; INTRAVENOUS at 15:03

## 2020-09-22 RX ADMIN — OXYCODONE HYDROCHLORIDE 5 MG: 5 TABLET ORAL at 17:23

## 2020-09-22 RX ADMIN — HYDROMORPHONE HYDROCHLORIDE 0.3 MG: 1 INJECTION, SOLUTION INTRAMUSCULAR; INTRAVENOUS; SUBCUTANEOUS at 15:43

## 2020-09-22 RX ADMIN — PHENYLEPHRINE HYDROCHLORIDE 100 MCG: 10 INJECTION INTRAVENOUS at 12:03

## 2020-09-22 RX ADMIN — FENTANYL CITRATE 25 MCG: 50 INJECTION, SOLUTION INTRAMUSCULAR; INTRAVENOUS at 14:35

## 2020-09-22 RX ADMIN — FENTANYL CITRATE 25 MCG: 50 INJECTION, SOLUTION INTRAMUSCULAR; INTRAVENOUS at 14:21

## 2020-09-22 RX ADMIN — PROPOFOL 60 MG: 10 INJECTION, EMULSION INTRAVENOUS at 13:45

## 2020-09-22 RX ADMIN — VENLAFAXINE HYDROCHLORIDE 37.5 MG: 37.5 TABLET ORAL at 21:03

## 2020-09-22 RX ADMIN — PHENYLEPHRINE HYDROCHLORIDE 100 MCG: 10 INJECTION INTRAVENOUS at 12:25

## 2020-09-22 RX ADMIN — FENTANYL CITRATE 25 MCG: 50 INJECTION, SOLUTION INTRAMUSCULAR; INTRAVENOUS at 15:12

## 2020-09-22 RX ADMIN — ESMOLOL HYDROCHLORIDE 10 MG: 10 INJECTION, SOLUTION INTRAVENOUS at 15:12

## 2020-09-22 RX ADMIN — FENTANYL CITRATE 25 MCG: 50 INJECTION, SOLUTION INTRAMUSCULAR; INTRAVENOUS at 15:33

## 2020-09-22 RX ADMIN — ONDANSETRON 4 MG: 2 INJECTION INTRAMUSCULAR; INTRAVENOUS at 15:55

## 2020-09-22 RX ADMIN — Medication 10 MG: at 12:13

## 2020-09-22 RX ADMIN — CEFAZOLIN SODIUM 2 G: 2 INJECTION, SOLUTION INTRAVENOUS at 22:35

## 2020-09-22 RX ADMIN — MIDAZOLAM 2 MG: 1 INJECTION INTRAMUSCULAR; INTRAVENOUS at 14:05

## 2020-09-22 RX ADMIN — HYDROMORPHONE HYDROCHLORIDE 0.2 MG: 1 INJECTION, SOLUTION INTRAMUSCULAR; INTRAVENOUS; SUBCUTANEOUS at 15:59

## 2020-09-22 RX ADMIN — FENTANYL CITRATE 25 MCG: 50 INJECTION, SOLUTION INTRAMUSCULAR; INTRAVENOUS at 16:04

## 2020-09-22 RX ADMIN — SODIUM CHLORIDE, POTASSIUM CHLORIDE, SODIUM LACTATE AND CALCIUM CHLORIDE: 600; 310; 30; 20 INJECTION, SOLUTION INTRAVENOUS at 14:34

## 2020-09-22 RX ADMIN — FENTANYL CITRATE 25 MCG: 50 INJECTION, SOLUTION INTRAMUSCULAR; INTRAVENOUS at 17:27

## 2020-09-22 RX ADMIN — ESMOLOL HYDROCHLORIDE 10 MG: 10 INJECTION, SOLUTION INTRAVENOUS at 14:21

## 2020-09-22 RX ADMIN — SODIUM CHLORIDE, POTASSIUM CHLORIDE, SODIUM LACTATE AND CALCIUM CHLORIDE: 600; 310; 30; 20 INJECTION, SOLUTION INTRAVENOUS at 22:42

## 2020-09-22 RX ADMIN — Medication 100 MG: at 13:45

## 2020-09-22 RX ADMIN — GLYCOPYRROLATE 0.4 MG: 0.2 INJECTION, SOLUTION INTRAMUSCULAR; INTRAVENOUS at 12:22

## 2020-09-22 RX ADMIN — ESMOLOL HYDROCHLORIDE 10 MG: 10 INJECTION, SOLUTION INTRAVENOUS at 14:47

## 2020-09-22 RX ADMIN — CEFAZOLIN 1 G: 10 INJECTION, POWDER, FOR SOLUTION INTRAVENOUS at 14:06

## 2020-09-22 RX ADMIN — TRANEXAMIC ACID 1950 MG: 650 TABLET ORAL at 10:07

## 2020-09-22 RX ADMIN — PHENYLEPHRINE HYDROCHLORIDE 100 MCG: 10 INJECTION INTRAVENOUS at 12:07

## 2020-09-22 RX ADMIN — ROCURONIUM BROMIDE 40 MG: 10 INJECTION INTRAVENOUS at 13:51

## 2020-09-22 RX ADMIN — Medication 5 MG: at 12:10

## 2020-09-22 RX ADMIN — ROCURONIUM BROMIDE 10 MG: 10 INJECTION INTRAVENOUS at 15:12

## 2020-09-22 RX ADMIN — PROPOFOL 75 MCG/KG/MIN: 10 INJECTION, EMULSION INTRAVENOUS at 12:10

## 2020-09-22 RX ADMIN — FENTANYL CITRATE 25 MCG: 50 INJECTION, SOLUTION INTRAMUSCULAR; INTRAVENOUS at 14:44

## 2020-09-22 RX ADMIN — FENTANYL CITRATE 25 MCG: 50 INJECTION, SOLUTION INTRAMUSCULAR; INTRAVENOUS at 14:23

## 2020-09-22 RX ADMIN — ALBUTEROL SULFATE 6 PUFF: 90 AEROSOL, METERED RESPIRATORY (INHALATION) at 15:46

## 2020-09-22 RX ADMIN — OXYCODONE HYDROCHLORIDE 5 MG: 5 TABLET ORAL at 22:41

## 2020-09-22 RX ADMIN — SODIUM CHLORIDE, POTASSIUM CHLORIDE, SODIUM LACTATE AND CALCIUM CHLORIDE: 600; 310; 30; 20 INJECTION, SOLUTION INTRAVENOUS at 11:52

## 2020-09-22 RX ADMIN — PROPOFOL 30 MG: 10 INJECTION, EMULSION INTRAVENOUS at 15:12

## 2020-09-22 RX ADMIN — CEFAZOLIN 2 G: 10 INJECTION, POWDER, FOR SOLUTION INTRAVENOUS at 12:00

## 2020-09-22 RX ADMIN — Medication 10 MG: at 12:03

## 2020-09-22 RX ADMIN — PHENYLEPHRINE HYDROCHLORIDE 0.3 MCG/KG/MIN: 10 INJECTION INTRAVENOUS at 12:26

## 2020-09-22 RX ADMIN — ESMOLOL HYDROCHLORIDE 10 MG: 10 INJECTION, SOLUTION INTRAVENOUS at 14:09

## 2020-09-22 RX ADMIN — DOCUSATE SODIUM 50 MG AND SENNOSIDES 8.6 MG 1 TABLET: 8.6; 5 TABLET, FILM COATED ORAL at 19:52

## 2020-09-22 RX ADMIN — BUPIVACAINE HYDROCHLORIDE 2.5 ML: 5 INJECTION, SOLUTION EPIDURAL; INTRACAUDAL at 12:00

## 2020-09-22 RX ADMIN — ACETAMINOPHEN 975 MG: 325 TABLET, FILM COATED ORAL at 19:52

## 2020-09-22 RX ADMIN — FENTANYL CITRATE 25 MCG: 50 INJECTION, SOLUTION INTRAMUSCULAR; INTRAVENOUS at 15:55

## 2020-09-22 ASSESSMENT — ACTIVITIES OF DAILY LIVING (ADL)
BATHING: 0-->INDEPENDENT
TOILETING: 0-->INDEPENDENT
RETIRED_EATING: 0-->INDEPENDENT
RETIRED_COMMUNICATION: 0-->UNDERSTANDS/COMMUNICATES WITHOUT DIFFICULTY
AMBULATION: 1-->ASSISTIVE EQUIPMENT
COGNITION: 0 - NO COGNITION ISSUES REPORTED
DRESS: 0-->INDEPENDENT
NUMBER_OF_TIMES_PATIENT_HAS_FALLEN_WITHIN_LAST_SIX_MONTHS: 1
TRANSFERRING: 1-->ASSISTIVE EQUIPMENT
FALL_HISTORY_WITHIN_LAST_SIX_MONTHS: YES
SWALLOWING: 0-->SWALLOWS FOODS/LIQUIDS WITHOUT DIFFICULTY

## 2020-09-22 ASSESSMENT — MIFFLIN-ST. JEOR: SCORE: 1492

## 2020-09-22 ASSESSMENT — ENCOUNTER SYMPTOMS: DYSRHYTHMIAS: 1

## 2020-09-22 NOTE — OR NURSING
"Patient was bladder scanned twice in PACU. 481ml was scanned at 1630 and 570ml was scanned at 1700. Patient was talked to about being straight cath. At this point patient stated that they have a \"cantelope size hematoma on bladder.\" Patient also stated that \"last time they were straight cath they went into Afib.\" Dr. Sorensen was notified at this time. Dr. Sorensen ordered to hold off on straight cath til 7pm. At 7pm patient will have to void on own or be straight cath. Will continue to monitor.  "

## 2020-09-22 NOTE — ANESTHESIA PROCEDURE NOTES
Spinal/LP Procedure Note    Spinal Block      Staff -   Anesthesiologist:  Igor Mcdermott MD  Resident/Fellow: Danish Olivera MD  Performed By: resident  Location: In OR BEFORE Induction  Procedure Start/Stop Times:     patient identified, IV checked, risks and benefits discussed, informed consent, monitors and equipment checked and pre-op evaluation      Correct Patient: Yes      Correct Position: Yes      Correct Site: Yes      Correct Procedure: Yes      Correct Laterality:  N/A  Procedure:     Procedure:  Intrathecal    ASA:  2    Position:  Sitting    Sterile Prep: chloraprep      Insertion site:  L3-4    Approach:  Midline    Needle Type:  Cuate    Needle gauge (G):  22    Local Skin Infiltration:  1% lidocaine    amount (ml):  3    Needle Length (in):  3.5    Introducer used: Yes      Attempts:  1    Redirects:  1    CSF:  Clear    Paresthesias:  No

## 2020-09-22 NOTE — PROGRESS NOTES
Orthopaedic Post-Op Check  Summary: POD0 s/p right LACEY     S: Seen in PACU. Sleepy but comfortable. Following commands.     O:  VSS    Exam  MSK:   RLE  Surgical dressings on R hip c/d/I  SILT spn/dpn/sural/saphenous/tibial nerves  Firing EHL, FHL, TA, Gastroc 5/5  DP+2, PT+2    Imaging  Xray R hip 2 views portable 9/22: stable LACEY in good alignment.     A: 64F s/p R LACEY on 9/22 with Dr. Box. Recovering well on POD0.     P: see brief op note    Linette Milan MD   Orthopedic Surgery, PGY4  [phone]

## 2020-09-22 NOTE — OR NURSING
"PACU to Inpatient Nursing Handoff    Patient Mary Lou Connelly is a 64 year old female who speaks English.   Procedure Procedure(s):  Right total hip arthroplasty   Surgeon(s) Primary: Emmanuel Box MD  Resident - Assisting: Linette Milan MD     Allergies   Allergen Reactions     Nkda [No Known Drug Allergies]        Isolation  [unfilled]     Past Medical History   has a past medical history of Cancer (H) (1996), Depressive disorder (4/2013), Hypertension goal BP (blood pressure) < 140/90, Other secondary osteoarthritis of right hip (8/12/2020), and Seasonal allergies.    Anesthesia General   Dermatome Level Dermatomes Left: S1  Dermatomes Right: S1   Preop Meds acetaminophen (Tylenol) - time given: 0700   Nerve block Not applicable   Intraop Meds dexamethasone (Decadron)  fentanyl (Sublimaze): 200 mcg total  hydromorphone (Dilaudid): .5 mg total  ondansetron (Zofran): last given at 1555  glyco, phenylephrine, ephedrine, esmolol   Local Meds Yes - Local Cocktail (morphine, ropivacaine, epinephrine, Toradol)   Antibiotics cefazolin (Ancef) - last given at 1400     Pain Patient Currently in Pain: yes(patient states pain is a \"2\" and tolerable)  Comfort: comfortably manageable  Pain Control: partially effective   PACU meds  Not applicable   PCA / epidural No   Capnography Respiratory Monitoring (EtCO2): 45 mmHg   Telemetry ECG Rhythm: Normal sinus rhythm(ekg done at bedside)   Inpatient Telemetry Monitor Ordered? No        Labs Glucose Lab Results   Component Value Date    GLC 88 09/22/2020       Hgb No results found for: HGB    INR No results found for: INR   PACU Imaging Completed     Wound/Incision Incision/Surgical Site 04/22/15 Left Leg (Active)   Number of days: 1980       Incision/Surgical Site 11/18/15 Right Leg (Active)   Number of days: 1770       Incision/Surgical Site 09/22/20 Right;Lateral Hip (Active)   Incision Assessment WDL 09/22/20 1603   Closure Sutures 09/22/20 1603   Incision Drainage Amount " None 09/22/20 1603   Dressing Intervention Clean, dry, intact 09/22/20 1603   Number of days: 0      CMS        Equipment ice pack   Other LDA Airway - Adult/Peds 7 oral (Active)   Number of days: 1980        IV Access Peripheral IV 09/22/20 Left Hand (Active)   Site Assessment WD 09/22/20 1603   Line Status Infusing 09/22/20 1603   Phlebitis Scale 0-->no symptoms 09/22/20 1603   Infiltration Scale 0 09/22/20 1017   Infiltration Site Treatment Method  None 09/22/20 1603   Extravasation? No 09/22/20 1603   Dressing Intervention New dressing  09/22/20 1017   Number of days: 0       Peripheral IV Right Lower forearm (Active)   Site Assessment [facility] 09/22/20 1603   Line Status Saline locked 09/22/20 1603   Phlebitis Scale 0-->no symptoms 09/22/20 1603   Infiltration Site Treatment Method  None 09/22/20 1603   Extravasation? No 09/22/20 1603   Number of days:       Blood Products Not applicable EBL NA mL   Intake/Output Date 09/22/20 0700 - 09/23/20 0659   Shift 4990-8872 2196-0247 0765-9102 24 Hour Total   INTAKE   I.V. 1000   1000   Shift Total(mL/kg) 1000(10.56)   1000(10.56)   OUTPUT   Shift Total(mL/kg)       Weight (kg) 94.7 94.7 94.7 94.7      Drains / Viveros     Time of void PreOp Void Prior to Procedure: 0930 (09/22/20 0957)    PostOp      Diapered? No   Bladder Scan Bladder Scan Volume (mL): 481 ml (09/22/20 1630)   PO    ice chips     Vitals    B/P: 136/85  T: 97.5  F (36.4  C)    Temp src: Oral  P:  Pulse: 89 (09/22/20 1630)          R: 12  O2:  SpO2: 100 %    O2 Device: Simple face mask (09/22/20 1630)    Oxygen Delivery: 4 LPM (09/22/20 1630)         Family/support present no family present at bedside   Patient belongings     Patient transported on bed   DC meds/scripts (obs/outpt) Not applicable   Inpatient Pain Meds Released? Yes       Special needs/considerations EKG done in PACU. Troponin drawn and ordered every 2 hours x3.   Tasks needing completion None       Eulalia Tan, RN  ASCOM [id number]

## 2020-09-22 NOTE — ANESTHESIA CARE TRANSFER NOTE
Patient: Mary Lou Connelly    Procedure(s):  Right total hip arthroplasty    Diagnosis: Other secondary osteoarthritis of right hip [M16.7]  Diagnosis Additional Information: No value filed.    Anesthesia Type:   Spinal, MAC     Note:  Airway :Face Mask  Patient transferred to:PACU  Comments: Spont respirations, arousable and oriented, redirectable. States she has no pain. VSS, FMO2 8L. Handoff Report: Identifed the Patient, Identified the Reponsible Provider, Reviewed the pertinent medical history, Discussed the surgical course, Reviewed Intra-OP anesthesia mangement and issues during anesthesia, Set expectations for post-procedure period and Allowed opportunity for questions and acknowledgement of understanding      Vitals: (Last set prior to Anesthesia Care Transfer)    CRNA VITALS  9/22/2020 1530 - 9/22/2020 1609      9/22/2020             Pulse:  107    SpO2:  98 %                Electronically Signed By: LUZ Gibson CRNA  September 22, 2020  4:09 PM

## 2020-09-22 NOTE — BRIEF OP NOTE
[facility], [place]    Brief Operative Note    Pre-operative diagnosis: Other secondary osteoarthritis of right hip [M16.7]  Post-operative diagnosis Same as pre-operative diagnosis    Procedure: Procedure(s):  Right total hip arthroplasty  Surgeon: Surgeon(s) and Role:     * Emmanuel Box MD - Primary     * Linette Milan MD - Resident - Assisting  Anesthesia: General   Estimated blood loss: Less than 100 ml  Drains: None  Specimens: * No specimens in log *  Findings:   near acetabular protrusio. Acetabulum bone grafted with femoral head autograft. 36 mm head. 60 mm cup. Lateralized liner. Size 14 stem with standard offset..  Complications: run of ventricular tachycardia. Required intraoperative conversion to general anesthesia. .  Implants:   Implant Name Type Inv. Item Serial No.  Lot No. LRB No. Used Action   IMP SHELL ACETABULUM BIOM RANAWAT 60MM SIZE [id number] Total Joint Component/Insert IMP SHELL ACETABULUM BIOM RANAWAT 60MM SIZE [id number]  [employer] [id number] Right 1 Implanted   IMP SCR BIOM 6.5X50MM LOW PROF TI [id number] Metallic Hardware/[place] IMP SCR BIOM 6.5X50MM LOW PROF TI [id number]  [employer] [id number] Right 1 Implanted   Biomet Meniscal Bearing Med Sz 5mm thick Total Joint Component/Insert   BI[employer]ET [id number] Right 1 Implanted   IMP SCR BIOM 6.5X35MM LOW PROF TI [id number] Metallic Hardware/[place] IMP SCR BIOM 6.5X35MM LOW PROF TI [id number]  [employer] [id number] Right 1 Implanted   Biomet Taperloc Complete Primary Femoral Porous Coated Stem Reduced Distal 65h198it Standard Offset Type 1 Taper for Cementless Use    BIOMET [id number] Right 1 Implanted   Biomet Ringloc Hip System Acetabular Liner Hi-Wall E1 Antioxidant Infused 36mm 25   [id number] BIOMET [id number] Right 1 Implanted   IMP HEAD FEMORAL BIOM MOD 36MM -3  [id number] Total Joint Component/Insert IMP HEAD FEMORAL BIOM MOD 36MM -3  [id number]  [employer] [id number] Right 1 Implanted     Plan:  Ortho  Primary  Activity: Up with assist. Posterior hip precautions x3 months. Start physical therapy POD#1.  Weight bearing status: Weight bearing as tolerated  Antibiotics: Ancef x24 hours perioperatively.  Diet: Begin with clear fluids and progress diet as tolerated.  DVT prophylaxis: aspirin 162 mg daily and mechanical while in the hospital, discharge on aspirin x4 weeks.  Bracing/Splinting: abduction pillow while in bed.  Wound Care: aquacel R hip x7 days  Pain management: transition from IV to orals as tolerated.   X-rays: POD #0 R hip 2v PACU   PT/OT: ROM, ADL's.  Labs: stat ECG in PACU and trend troponins q2h postop given intraop VTach. Trend Hgb on POD #1, 2, 3.  Consults: PT, OT. hospitalist, appreciate assistance in caring for this patient.  Follow-up: Clinic with Dr. Box in 4 weeks. Can be video visit.     Disposition: Pending progress with therapies, pain control on orals, and medical stability, anticipate discharge to home on POD #1.    Linette Milan MD   Orthopedic Surgery, PGY4  [phone]

## 2020-09-22 NOTE — ANESTHESIA POSTPROCEDURE EVALUATION
Anesthesia POST Procedure Evaluation    Patient: Mary Lou Connelly   MRN:     [id number] Gender:   female   Age:    64 year old :      1955        Preoperative Diagnosis: Other secondary osteoarthritis of right hip [M16.7]   Procedure(s):  Right total hip arthroplasty   Postop Comments: No value filed.     Anesthesia Type: Spinal, MAC       Disposition: Outpatient   Postop Pain Control: Uneventful            Sign Out: Well controlled pain   PONV: No   Neuro/Psych: Uneventful            Sign Out: Acceptable/Baseline neuro status   Airway/Respiratory: Uneventful            Sign Out: AIRWAY IN SITU/Resp. Support   CV/Hemodynamics: Uneventful            Sign Out: Acceptable CV status   Other NRE: NONE   DID A NON-ROUTINE EVENT OCCUR? No         Last Anesthesia Record Vitals:  CRNA VITALS  2020 1530 - 2020 1630      2020             NIBP:  110/66    Pulse:  92    NIBP Mean:  73    Temp:  36.8  C (98.2  F)    SpO2:  97 %    Resp Rate (observed):  14          Last PACU Vitals:  Vitals Value Taken Time   /68 2020  6:25 PM   Temp 36.2  C (97.1  F) 2020  6:25 PM   Pulse 70 2020  6:25 PM   Resp 10 2020  6:25 PM   SpO2 96 % 2020  6:25 PM   Temp src     NIBP 110/66 2020  4:03 PM   Pulse 92 2020  4:03 PM   SpO2 97 % 2020  4:03 PM   Resp     Temp 36.8  C (98.2  F) 2020  4:03 PM   Ht Rate     Temp 2           Electronically Signed By: Brian Sorensen DO, 2020, 6:59 PM

## 2020-09-22 NOTE — CONSULTS
[facility], [place]  Consult Note - Hospitalist Service     Date of Admission:  9/22/2020  Consult Requested by: Linette Milan MD  Reason for Consult: Posto[perative co management     Assessment & Plan   Mary Lou Connelly is a 64 year old female admitted on 9/22/2020. She has a known H/O obesity with recent weight loss surgery ( Laparoscopic Sleeve Gastrectomy), with 50 pound weight loss, recent h/o post op AF ( resolved), TANIA on CPAP, resolved HTN ( with weight loss) and h/o anal squamous cell carcinoma ( S/P radiation)  She underwent Rt sided total hip arthroplasty. Internal medicine consulted for postoperative co management  Individual problems and their management are outlined below        S/P Rt Hip Arthroplasty, POD#0:  Management by primary team. Please see their notes for further details  Continue IV fluids until able to take oral diet   Pain control with acetaminophen 975 mg every 8 hrs for 3 days  Oxycodone 5-10 mg every 3 hrs PRN and IV hydromorphone 0.2.mg q 2 hrs  PRN for breakthrough pain   DVT prophylaxis with SCDs while in hospital and aspirin 162 mg daily for 1 month   Perioperative antibiotics as per primary team   Overnight Capnography monitoring  PT/OT as per protocol  Intermittent straight cayt for urinary retention    Incentive spirometry and aggressive bowel regimen (This has been ordered)  We will monitor for acute blood loss anemia. Pre op Hgb at 12.1 ( Per op H&P)      H/O Atrial fibrillation  HTN  Intraoperative VT ( Brief)  Patient has post op AF after sleeve gastrectomy. She was on a zio patch, which has since been discontinued with no evidence of recurrence of A Fib. Patient's amiodarone was stopped after she was evaluated by cardiology team. She was not on anticoagulation  Echo from 2/2020 with normal systolic and diastolic function  Previously, patient was on Lisinopril, which has been stopped after patient was normotensive with weight loss    Intraoperative VT resolved. Initial troponin was negative    Depression:  Stable  Resume home dose of Venlafaxine 37.5 mg BID      Obesity:  Bile Acid malabsorption   Recent Sleeve gastrectomy with 50 pound weight loss   Resume home dose of Omeprazole   Patient is on a multivitamin patch, which is non formulary. This can be resumed when patient discharges   Resume colestipol for bile acid malabsorptrion     TANIA:  Resume home CPAP         The patient's care was discussed with the Bedside Nurse and Patient.    Michele Sewell MD  [facility] [place]    ______________________________________________________________________    Chief Complaint    S/P Rt Total Hip Arthroplasty     History is obtained from the patient, pre op physical and perioperative notes     History of Present Illness   Mary Lou Connelly is a 64 year old female who underwent the above procedure today.  The procedure was uneventful in the sense that, patient had a brief episode of ventricular tachycardia intraoperatively requiring the mode of anesthesia to be converted from spinal to general anesthesia.  Patient received IV esmolol during this..  Subsequently the surgery was uneventful.  Estimated blood loss was less than 100 mL.  Patient received about 1000 mL of lactated Ringer solution as part of her perioperative fluids.    I met patient on floor 5 Ortho where she was resting comfortably.  She denies any chest pain or shortness of breath.  She denies any fevers or chills.  She denies any nausea, vomiting or diarrhea.  We discussed in some length about her past medical history including her recent gastric bypass surgery/sleeve gastrectomy, perioperative atrial fibrillation which is since resolved and depression.  All of patient's medications were reconciled.  Patient is in a pleasant mood.    Review of Systems   The 10 point Review of Systems is negative other than noted in the HPI or here.     Past Medical  History    I have reviewed this patient's medical history and updated it with pertinent information if needed.   Past Medical History:   Diagnosis Date     Cancer (H)     Anal cancer     Depressive disorder 2013     Hypertension goal BP (blood pressure) < 140/90      Other secondary osteoarthritis of right hip 2020     Seasonal allergies        Past Surgical History   I have reviewed this patient's surgical history and updated it with pertinent information if needed.  Past Surgical History:   Procedure Laterality Date     BIOPSY      Anus     BIOPSY      Anus     CHOLECYSTECTOMY  6/2/15    Gallstones     COLONOSCOPY       LIGATE VEIN Left 2015    Procedure: LIGATE VEIN;  Surgeon: Thierry Landers MD;  Location: [facility] OR     LIGATE VEIN Right 2015    Procedure: LIGATE VEIN;  Surgeon: Thierry Landers MD;  Location: [facility] OR     NO HISTORY OF SURGERY       PHLEBECTOMY MULTIPLE STAB Right 2015    Procedure: PHLEBECTOMY MULTIPLE STAB;  Surgeon: Thierry Landers MD;  Location: [facility] OR       Social History   I have reviewed this patient's social history and updated it with pertinent information if needed.  Social History     Tobacco Use     Smoking status: Former Smoker     Packs/day: 1.00     Years: 13.00     Pack years: 13.00     Types: Cigarettes     Start date: 1975     Last attempt to quit: 10/31/1988     Years since quittin.9     Smokeless tobacco: Never Used   Substance Use Topics     Alcohol use: No     Drug use: No       Family History   I have reviewed this patient's family history and updated it with pertinent information if needed.   Family History   Problem Relation Age of Onset     Cancer - colorectal Mother      Colon Cancer Mother      Alcohol/Drug Father      Cancer - colorectal Father      Colon Cancer Father      Substance Abuse Father      Alcohol/Drug Paternal Grandfather      Cancer - colorectal Paternal Grandfather        Medications   I have  reviewed this patient's current medications    Allergies   Allergies   Allergen Reactions     Nkda [No Known Drug Allergies]        Physical Exam   Vital Signs: Temp: 97.5  F (36.4  C) Temp src: Oral BP: 124/76 Pulse: 90   Resp: 20 SpO2: 94 % O2 Device: Nasal cannula Oxygen Delivery: 3 LPM  Weight: 208 lbs 12.41 oz    Constitutional: awake, alert, cooperative, no apparent distress, and appears stated age  Eyes: Lids and lashes normal, pupils equal, round and reactive to light, extra ocular muscles intact, sclera clear, conjunctiva normal  ENT: Normocephalic, without obvious abnormality, atraumatic, sinuses nontender on palpation, external ears without lesions, oral pharynx with moist mucous membranes  Respiratory: No increased work of breathing, good air exchange, clear to auscultation bilaterally, no crackles or wheezing  Cardiovascular: Normal apical impulse, regular rate and rhythm, normal S1 and S2, no S3 or S4, and no murmur noted  GI: Normal bowel sounds, soft, non-distended, non-tender, no masses palpated, no hepatosplenomegally  Skin: no bruising or bleeding  Musculoskeletal: left hip covered with dressings. No distal deficits   Neurologic: Awake, alert, oriented to name, place and time.  Cranial nerves II-XII are grossly intact.      Data   Results for orders placed or performed during the hospital encounter of 09/22/20 (from the past 24 hour(s))   Creatinine   Result Value Ref Range    Creatinine 0.83 0.52 - 1.04 mg/dL    GFR Estimate 76 >60 mL/min/[1.73_m2]    GFR Estimate If Black 88 >60 mL/min/[1.73_m2]   Potassium   Result Value Ref Range    Potassium 4.0 3.4 - 5.3 mmol/L   Glucose   Result Value Ref Range    Glucose 88 70 - 99 mg/dL   ABO/Rh type and screen   Result Value Ref Range    Units Ordered 1     ABO A     RH(D) Neg     Antibody Screen Neg     Test Valid Only At          [facility]    Specimen Expires 09/25/2020     Crossmatch Red Blood Cells     Blood component   Result Value Ref Range    Unit Number [id number]     Blood Component Type Red Blood Cells Leukocyte Reduced     Division Number 00     Status of Unit Ready for patient 09/22/2020 1051     Blood Product Code [id number]     Unit Status BISI    XR Pelvis Port 1/2 Views    Narrative    Exam: Single intraoperative view of the pelvis dated 9/22/2020.    COMPARISON: 6/22/2020.    CLINICAL HISTORY: Right total hip arthroplasty.    FINDINGS: Single intraoperative view of the right hip was obtained.  Postsurgical changes of placement of a right total hip arthroplasty.  Surgical defect seen along the right lateral hip. Acetabular cup with  2 screws in place. Femoral stem component in place. Bones are  osteopenic appearing. Fracture deformities of the pubic symphysis  unchanged. Degenerative changes in the left hip joint.      Impression    IMPRESSION: Intraoperative evaluation of ongoing placement of a right  total hip arthroplasty, as above.    LIONEL TAVERAS MD   EKG 12-lead, complete   Result Value Ref Range    Interpretation ECG Click View Image link to view waveform and result    Troponin I   Result Value Ref Range    Troponin I ES <0.015 0.000 - 0.045 ug/L

## 2020-09-22 NOTE — ANESTHESIA PREPROCEDURE EVALUATION
"Anesthesia Pre-Procedure Evaluation    Patient: Mary Lou Connelly   MRN:     [id number] Gender:   female   Age:    64 year old :      1955        Preoperative Diagnosis: Other secondary osteoarthritis of right hip [M16.7]   Procedure(s):  Right total hip arthroplasty     LABS:  CBC: No results found for: WBC, HGB, HCT, PLT  BMP:   Lab Results   Component Value Date     2015     2015    POTASSIUM 4.0 2015    POTASSIUM 3.9 2015    CHLORIDE 105 2015    CHLORIDE 103 2015    CO2 29 2015    CO2 27 04/15/2015    BUN 27 2018    BUN 13 2015    CR 0.86 2018    CR 1.04 12/10/2015     (A) 12/10/2015     (A) 2015     COAGS: No results found for: PTT, INR, FIBR  POC: No results found for: BGM, HCG, HCGS  OTHER:   Lab Results   Component Value Date    LYNNETTE 9.9 2018    PHOS 2.7 2018    ALBUMIN 3.8 2015    PROTTOTAL 7.7 2015    ALT 32 2015    AST 20 2015    ALKPHOS 103 2018    BILITOTAL 0.3 2015    TSH 1.84 2014        Preop Vitals    BP Readings from Last 3 Encounters:   19 117/71   17 154/84   16 131/73    Pulse Readings from Last 3 Encounters:   19 71   17 83   16 78      Resp Readings from Last 3 Encounters:   11/18/15 16   04/22/15 16   07/15/14 14    SpO2 Readings from Last 3 Encounters:   19 95%   17 94%   16 95%      Temp Readings from Last 1 Encounters:   17 36.6  C (97.9  F) (Oral)    Ht Readings from Last 1 Encounters:   19 1.613 m (5' 3.5\")      Wt Readings from Last 1 Encounters:   20 93.8 kg (206 lb 12.8 oz)    Estimated body mass index is 36.06 kg/m  as calculated from the following:    Height as of 9/3/19: 1.613 m (5' 3.5\").    Weight as of 20: 93.8 kg (206 lb 12.8 oz).     LDA:  Airway - Adult/Peds 7 oral (Active)   Number of days:         Past Medical History:   Diagnosis Date     " Cancer (H) 1996    Anal cancer     Depressive disorder 4/2013     Hypertension goal BP (blood pressure) < 140/90      Other secondary osteoarthritis of right hip 8/12/2020     Seasonal allergies       Past Surgical History:   Procedure Laterality Date     BIOPSY  1995    Anus     BIOPSY      Anus     CHOLECYSTECTOMY  6/2/15    Gallstones     COLONOSCOPY  2013     LIGATE VEIN Left 4/22/2015    Procedure: LIGATE VEIN;  Surgeon: Thierry Landers MD;  Location: [facility] OR     LIGATE VEIN Right 11/18/2015    Procedure: LIGATE VEIN;  Surgeon: Thierry Landers MD;  Location: [facility] OR     NO HISTORY OF SURGERY       PHLEBECTOMY MULTIPLE STAB Right 11/18/2015    Procedure: PHLEBECTOMY MULTIPLE STAB;  Surgeon: Thierry Landers MD;  Location: [facility] OR      Allergies   Allergen Reactions     Nkda [No Known Drug Allergies]         Anesthesia Evaluation     .             ROS/MED HX    ENT/Pulmonary:     (+)sleep apnea, , . .    Neurologic:  - neg neurologic ROS     Cardiovascular: Comment: H/o atrial fibrillation    (+) hypertension----. : . . . :. dysrhythmias a-fib, . Previous cardiac testing Echodate:2/7/2020results: 1. Normal left ventricular size, mildly increased wall thickness, normal global systolic function, calculated EF of 68 %.   2. No significant valve disease detected.   3. Right ventricular cavity size is normal, global systolic RV function is normal.   4. Normal left atrium size.date: results: date: results: date: results:          METS/Exercise Tolerance:  >4 METS   Hematologic:  - neg hematologic  ROS       Musculoskeletal:   (+) arthritis,  -       GI/Hepatic: Comment: S/p vertical gastric sleeve        Renal/Genitourinary:  - ROS Renal section negative       Endo:     (+) Obesity, .      Psychiatric:     (+) psychiatric history depression      Infectious Disease:  - neg infectious disease ROS       Malignancy:      - no malignancy   Other:    - neg other ROS                     PHYSICAL EXAM:    Mental Status/Neuro: A/A/O; Age Appropriate   Airway: Facies: Feasible  Mallampati: II  Mouth/Opening: Full  TM distance: > 6 cm  Neck ROM: Full   Respiratory: Auscultation: CTAB     Resp. Rate: Normal     Resp. Effort: Normal      CV: Rhythm: Regular  Rate: Age appropriate  Heart: Normal Sounds  Edema: None   Comments:      Dental: Normal Dentition                Assessment:   ASA SCORE: 2    H&P: History and physical reviewed and following examination; no interval change.    NPO Status: NPO Appropriate     Plan:   Anes. Type:  Spinal; MAC   Pre-Medication: None   Induction:  N/a   Airway: Native Airway   Access/Monitoring: PIV   Maintenance: N/a     Postop Plan:   Postop Pain: Regional  Postop Sedation/Airway: Not planned     PONV Management: Adult Risk Factors: Female   Prevention: Ondansetron, Propofol     CONSENT: Direct conversation   Plan and risks discussed with: Patient   Blood Products: Consented (ALL Blood Products)                   Danish Olivera MD

## 2020-09-23 ENCOUNTER — APPOINTMENT (OUTPATIENT)
Dept: OCCUPATIONAL THERAPY | Facility: CLINIC | Age: 65
End: 2020-09-23
Attending: ORTHOPAEDIC SURGERY
Payer: COMMERCIAL

## 2020-09-23 ENCOUNTER — APPOINTMENT (OUTPATIENT)
Dept: PHYSICAL THERAPY | Facility: CLINIC | Age: 65
End: 2020-09-23
Attending: ORTHOPAEDIC SURGERY
Payer: COMMERCIAL

## 2020-09-23 ENCOUNTER — PATIENT OUTREACH (OUTPATIENT)
Dept: CARE COORDINATION | Facility: CLINIC | Age: 65
End: 2020-09-23

## 2020-09-23 VITALS
RESPIRATION RATE: 16 BRPM | WEIGHT: 208.78 LBS | HEIGHT: 64 IN | BODY MASS INDEX: 35.64 KG/M2 | TEMPERATURE: 99.6 F | DIASTOLIC BLOOD PRESSURE: 60 MMHG | HEART RATE: 91 BPM | OXYGEN SATURATION: 92 % | SYSTOLIC BLOOD PRESSURE: 134 MMHG

## 2020-09-23 LAB
ALBUMIN SERPL-MCNC: 2.6 G/DL (ref 3.4–5)
ALP SERPL-CCNC: 103 U/L (ref 40–150)
ALT SERPL W P-5'-P-CCNC: 55 U/L (ref 0–50)
ANION GAP SERPL CALCULATED.3IONS-SCNC: 9 MMOL/L (ref 3–14)
AST SERPL W P-5'-P-CCNC: 53 U/L (ref 0–45)
BILIRUB SERPL-MCNC: 0.4 MG/DL (ref 0.2–1.3)
BUN SERPL-MCNC: 24 MG/DL (ref 7–30)
CALCIUM SERPL-MCNC: 8.8 MG/DL (ref 8.5–10.1)
CHLORIDE SERPL-SCNC: 102 MMOL/L (ref 94–109)
CO2 SERPL-SCNC: 24 MMOL/L (ref 20–32)
CREAT SERPL-MCNC: 0.7 MG/DL (ref 0.52–1.04)
GFR SERPL CREATININE-BSD FRML MDRD: >90 ML/MIN/{1.73_M2}
GLUCOSE SERPL-MCNC: 208 MG/DL (ref 70–99)
HGB BLD-MCNC: 9.3 G/DL (ref 11.7–15.7)
INTERPRETATION ECG - MUSE: NORMAL
POTASSIUM SERPL-SCNC: 4.4 MMOL/L (ref 3.4–5.3)
PROT SERPL-MCNC: 5.8 G/DL (ref 6.8–8.8)
SODIUM SERPL-SCNC: 135 MMOL/L (ref 133–144)
TROPONIN I SERPL-MCNC: <0.015 UG/L (ref 0–0.04)

## 2020-09-23 PROCEDURE — 36415 COLL VENOUS BLD VENIPUNCTURE: CPT | Performed by: STUDENT IN AN ORGANIZED HEALTH CARE EDUCATION/TRAINING PROGRAM

## 2020-09-23 PROCEDURE — 97116 GAIT TRAINING THERAPY: CPT | Mod: GP

## 2020-09-23 PROCEDURE — 25000132 ZZH RX MED GY IP 250 OP 250 PS 637: Performed by: STUDENT IN AN ORGANIZED HEALTH CARE EDUCATION/TRAINING PROGRAM

## 2020-09-23 PROCEDURE — 97530 THERAPEUTIC ACTIVITIES: CPT | Mod: GP

## 2020-09-23 PROCEDURE — 84484 ASSAY OF TROPONIN QUANT: CPT | Performed by: STUDENT IN AN ORGANIZED HEALTH CARE EDUCATION/TRAINING PROGRAM

## 2020-09-23 PROCEDURE — 93010 ELECTROCARDIOGRAM REPORT: CPT | Performed by: INTERNAL MEDICINE

## 2020-09-23 PROCEDURE — 99232 SBSQ HOSP IP/OBS MODERATE 35: CPT | Performed by: INTERNAL MEDICINE

## 2020-09-23 PROCEDURE — 97110 THERAPEUTIC EXERCISES: CPT | Mod: GP

## 2020-09-23 PROCEDURE — 25000132 ZZH RX MED GY IP 250 OP 250 PS 637: Performed by: INTERNAL MEDICINE

## 2020-09-23 PROCEDURE — 80053 COMPREHEN METABOLIC PANEL: CPT | Performed by: STUDENT IN AN ORGANIZED HEALTH CARE EDUCATION/TRAINING PROGRAM

## 2020-09-23 PROCEDURE — 97161 PT EVAL LOW COMPLEX 20 MIN: CPT | Mod: GP

## 2020-09-23 PROCEDURE — 25000128 H RX IP 250 OP 636: Performed by: STUDENT IN AN ORGANIZED HEALTH CARE EDUCATION/TRAINING PROGRAM

## 2020-09-23 PROCEDURE — 97165 OT EVAL LOW COMPLEX 30 MIN: CPT | Mod: GO

## 2020-09-23 PROCEDURE — 97530 THERAPEUTIC ACTIVITIES: CPT | Mod: GO

## 2020-09-23 PROCEDURE — 97535 SELF CARE MNGMENT TRAINING: CPT | Mod: GO

## 2020-09-23 PROCEDURE — 85018 HEMOGLOBIN: CPT | Performed by: STUDENT IN AN ORGANIZED HEALTH CARE EDUCATION/TRAINING PROGRAM

## 2020-09-23 RX ORDER — VENLAFAXINE 37.5 MG/1
37.5 TABLET ORAL 2 TIMES DAILY
COMMUNITY

## 2020-09-23 RX ORDER — ACETAMINOPHEN 325 MG/1
650 TABLET ORAL EVERY 4 HOURS PRN
Qty: 1 BOTTLE | Refills: 0 | Status: SHIPPED | OUTPATIENT
Start: 2020-09-25 | End: 2024-05-23

## 2020-09-23 RX ORDER — OXYCODONE HYDROCHLORIDE 5 MG/1
5-10 TABLET ORAL EVERY 4 HOURS PRN
Qty: 30 TABLET | Refills: 0 | Status: SHIPPED | OUTPATIENT
Start: 2020-09-23 | End: 2024-05-23

## 2020-09-23 RX ORDER — LORATADINE 10 MG/1
10 TABLET ORAL DAILY
COMMUNITY
End: 2024-05-23

## 2020-09-23 RX ORDER — ASPIRIN 81 MG/1
81 TABLET, CHEWABLE ORAL DAILY
Status: ON HOLD | COMMUNITY
End: 2020-09-23

## 2020-09-23 RX ORDER — POLYETHYLENE GLYCOL 3350 17 G/17G
17 POWDER, FOR SOLUTION ORAL DAILY
Qty: 7 PACKET | Refills: 0 | Status: SHIPPED | OUTPATIENT
Start: 2020-09-23 | End: 2024-05-23

## 2020-09-23 RX ORDER — ASPIRIN 81 MG/1
81 TABLET, CHEWABLE ORAL DAILY
COMMUNITY
Start: 2020-10-22 | End: 2024-05-23

## 2020-09-23 RX ORDER — FLUTICASONE PROPIONATE 50 MCG
1 SPRAY, SUSPENSION (ML) NASAL DAILY PRN
COMMUNITY
End: 2024-05-29

## 2020-09-23 RX ORDER — AMOXICILLIN 250 MG
2 CAPSULE ORAL 2 TIMES DAILY
Qty: 30 TABLET | Refills: 0 | Status: SHIPPED | OUTPATIENT
Start: 2020-09-23 | End: 2024-05-23

## 2020-09-23 RX ORDER — ACETAMINOPHEN 500 MG
500-1000 TABLET ORAL 2 TIMES DAILY
Status: ON HOLD | COMMUNITY
End: 2020-09-23

## 2020-09-23 RX ADMIN — LORATADINE 10 MG: 10 TABLET ORAL at 07:57

## 2020-09-23 RX ADMIN — BENZOCAINE AND MENTHOL 1 LOZENGE: 15; 3.6 LOZENGE ORAL at 10:51

## 2020-09-23 RX ADMIN — ACETAMINOPHEN 975 MG: 325 TABLET, FILM COATED ORAL at 10:51

## 2020-09-23 RX ADMIN — ACETAMINOPHEN 975 MG: 325 TABLET, FILM COATED ORAL at 04:21

## 2020-09-23 RX ADMIN — POLYETHYLENE GLYCOL 3350 17 G: 17 POWDER, FOR SOLUTION ORAL at 07:57

## 2020-09-23 RX ADMIN — MONTELUKAST SODIUM 2 G: 4 TABLET, CHEWABLE ORAL at 00:16

## 2020-09-23 RX ADMIN — OMEPRAZOLE 40 MG: 20 CAPSULE, DELAYED RELEASE ORAL at 07:14

## 2020-09-23 RX ADMIN — MONTELUKAST SODIUM 2 G: 4 TABLET, CHEWABLE ORAL at 09:04

## 2020-09-23 RX ADMIN — OXYCODONE HYDROCHLORIDE 5 MG: 5 TABLET ORAL at 04:24

## 2020-09-23 RX ADMIN — OXYCODONE HYDROCHLORIDE 5 MG: 5 TABLET ORAL at 10:51

## 2020-09-23 RX ADMIN — VENLAFAXINE HYDROCHLORIDE 37.5 MG: 37.5 TABLET ORAL at 07:57

## 2020-09-23 RX ADMIN — DOCUSATE SODIUM 50 MG AND SENNOSIDES 8.6 MG 2 TABLET: 8.6; 5 TABLET, FILM COATED ORAL at 07:57

## 2020-09-23 RX ADMIN — CEFAZOLIN SODIUM 2 G: 2 INJECTION, SOLUTION INTRAVENOUS at 06:00

## 2020-09-23 RX ADMIN — ASPIRIN 162 MG: 81 TABLET ORAL at 07:57

## 2020-09-23 NOTE — PLAN OF CARE
VS: VSS   O2: >90% on room air    Output: Voiding spontaneously    Last BM: 9/22/2020   Activity: WBAT. SBA with gait belt and walker    Skin: Incision to right hip    Pain: Managed with schedule tylenol and PRN oxy x1   CMS: Denies numbness and tingling    Dressing: Marked drainage noted- unchanged    Diet: Regular   LDA: PIV saline locked   Equipment: Walker, personal belongings, call light in reach   Plan: Discharge to home today    Additional Info:      Pt. discharged at 1400 to home. Pt. was accompanied by transport staff, and left with personal belongings. Prior to discharge, PIV was removed. Pt. received complete discharge paperwork and medications as filled by discharge pharmacy. Pt. was given times of last dose for all discharge medications in writing on discharge medication sheets.  Discharge teaching included medication, pain management, activity restrictions, dressing changes, and signs and symptoms of infection. Pt. to follow up with surgeon in 2weeks. Pt. had no further questions at the time of discharge and no unmet needs were identified.

## 2020-09-23 NOTE — PROGRESS NOTES
Care Coordinator Progress Note    Admission Date/Time:  9/22/2020  Attending MD:  Emmanuel Box MD    Data  Chart reviewed, discussed with interdisciplinary team.   Patient was admitted for:    Other secondary osteoarthritis of right hip  Other secondary osteoarthritis of right hip  Status post hip surgery.     Assessment  Per chart review no home care or outpatient therapy indicated at this time. RNCC available as needed.     Plan  Anticipated Discharge Date:  9/23/2020  Anticipated Discharge Plan:  Home    Alena Sherman RN, BSN  Care Coordinator, 8A  Phone [phone]  Pager [phone]

## 2020-09-23 NOTE — PROGRESS NOTES
Pt arrived on unit at approximately 630pm and was oriented to room and unit.  She arrived on a bed and was settled in room and all questions were answered.  Call light is within reach, will continue to monitor.

## 2020-09-23 NOTE — PROGRESS NOTES
Orthopaedic Surgery Progress Note 09/23/2020    IE: No acute events overnight. AFVSS. On 3L O2 LPM during the day and BiPAP/CPAP overnight. Sats >90. Had a 10 beat run of Vtach intra-operatively.     S: Pain well controlled. Denies numbness or tingling. Denies chest pain, SOB, nausea/vomiting. Tolerating regular diet. +flatus. Voiding spontaneously after straight cath.     O:  Temp: 97.4  F (36.3  C) Temp src: Oral BP: 116/59 Pulse: 80   Resp: 15 SpO2: 90 % O2 Device: BiPAP/CPAP Oxygen Delivery: 3 LPM    Exam:  Gen: No acute distress, resting comfortably in bed.  Resp: Non-labored breathing  MSK:  LE:  - Surgical dressings R hip c/d/i with 1.5x3 cm strikethrough midway along incision  - Abductor pillow in place   - SILT femoral/tibial/sural/saphenous/DP/SP nerves  - Fires TA, EHL, FHL, GaSC  - PT/DP pulses 2+, foot wwp    Labs:  Post op Trop 9/22: <0.015    EKG:  Post op 9/22: Sinus rhythm     No lab results found in last 7 days.    Invalid input(s): SEDRATE    Imaging:  XR R hip 2 view portable 9/22: stable LACEY in good alignment.    Assessment: Mary Lou Connelly is a 64 year old female s/p R LACEY on 9/22 with Dr. Box. Doing well.    Plan:  Ortho Primary  Activity: Up with assist. Posterior hip precautions x3 months. Start physical therapy POD#1.  Weight bearing status: Weight bearing as tolerated  Antibiotics: Ancef x24 hours perioperatively.  Diet: Begin with clear fluids and progress diet as tolerated.  DVT prophylaxis: aspirin 162 mg daily and mechanical while in the hospital, discharge on aspirin x4 weeks.  Bracing/Splinting: abduction pillow while in bed.  Wound Care: aquacel R hip x7 days  Pain management: transition from IV to orals as tolerated.   X-rays: POD #0 R hip 2v PACU. Complete.  PT/OT: ROM, ADL's.  Labs: stat ECG in PACU and trend troponins q2h postop given intraop VTach. Trend Hgb on POD #1, 2, 3.  Consults: PT, OT. hospitalist, appreciate assistance in caring for this patient.  Follow-up: Clinic  with Dr. Box in 4 weeks. Can be video visit.   Future Appointments   Date Time Provider Department Center   9/23/2020  6:30 AM UR OT OVERFLOW UROT [place]   9/23/2020  6:30 AM UR PT OVERFLOW URPT [place]       Disposition: Pending progress with therapies, pain control on orals, and medical stability, anticipate discharge to home on POD #1.      Patrice Xiao, MS4  [employer] [facility] [employer]    Linette Milan MD 09/23/2020  Orthopedic Surgery, PGY4  Pager: [phone]      Please page me directly with any questions/concerns prior to paging the orthopaedic surgery resident on call on M-F between 7AM-4PM. Thanks!

## 2020-09-23 NOTE — PLAN OF CARE
"  VS: BP (!) 142/68 (BP Location: Right arm)   Pulse 68   Temp 97.1  F (36.2  C) (Oral)   Resp 10   Ht 1.626 m (5' 4\")   Wt 94.7 kg (208 lb 12.4 oz)   SpO2 91%   BMI 35.84 kg/m     Denies chest pain and SOB  Alert and oriented   O2: >90% on 3L   Output: Voids via bathroom   Last BM: 9/22/20  Bowel sounds active   Activity: Ax1 with gait belt and walker   Skin: Intact. Incision to R hip   Pain: Managed w/ oxycodone q3hrs   CMS: Intact. Slight numbness to R hip due to surgery   Dressing: Scant drainage on dressing, marked   Diet: Clear liquid, tolerated well. Was eating gold fish crackers when she came back from surgery   LDA: PIV L hand - infusing. Capno.    Equipment: Personal belongings, IV pole, capno, walker   Plan: Continue to monitor.   Additional Info: Capno off at night for CPAP/BiPAP      "

## 2020-09-23 NOTE — PROGRESS NOTES
"VS: /59 (BP Location: Right arm)   Pulse 80   Temp 97.1  F (36.2  C) (Oral)   Resp 15   Ht 1.626 m (5' 4\")   Wt 94.7 kg (208 lb 12.4 oz)   SpO2 90%   BMI 35.84 kg/m  Pt denies CP or SOB.    O2: Room air sat. > 90%.    Output: Voiding adequate amount in bathroom   Last BM: 9/22/20. Passing flatus.   Activity: AX1 with walker and galt belt   Skin: Intact.Ex incision on right hip   Pain: Denies pain. Oxycodone available for pain   CMS: CMS and neuro intact. Denies nausea and vomiting, alert and oriented x4.   Dressing: Scant drainage  On dressing.   Diet: Clear liquid    LDA: PIV infusing. Capno   Equipment: IV pole, Capno, walker and personal belongings.   Plan: Continue with plan of care, call light within reach.   Additional Info:  CPAP/BiPAP for sleep.     "

## 2020-09-23 NOTE — PROGRESS NOTES
09/23/20 1100   Quick Adds   Type of Visit Initial Occupational Therapy Evaluation   Living Environment   Lives With spouse   Living Arrangements house   Home Accessibility stairs to enter home;stairs within home   Number of Stairs, Main Entrance 2   Number of Stairs, Within Home, Primary 10   Transportation Anticipated family or friend will provide   Living Environment Comment Patient will have 24 hr assist from spouse. Has raised toilet. Tub/shower with shower chair and grab bars.    Self-Care   Usual Activity Tolerance good   Current Activity Tolerance moderate   Equipment Currently Used at Home walker, rolling;shower chair   Functional Level   Ambulation 1-->assistive equipment   Transferring 1-->assistive equipment   Toileting 0-->independent   Bathing 0-->independent   Dressing 0-->independent   Eating 0-->independent   Communication 0-->understands/communicates without difficulty   Swallowing 0-->swallows foods/liquids without difficulty   Cognition 0 - no cognition issues reported   Fall history within last six months yes   Number of times patient has fallen within last six months 1   General Information   Onset of Illness/Injury or Date of Surgery - Date 09/22/20   Referring Physician Dr. Box   Patient/Family Goals Statement did not state, in agreement with OT related goals.    Additional Occupational Profile Info/Pertinent History of Current Problem s/p R LACEY   Precautions/Limitations fall precautions;right hip precautions   Weight-Bearing Status - RLE weight-bearing as tolerated   Cognitive Status Examination   Orientation orientation to person, place and time   Cognitive Comment no concerns   Visual Perception   Visual Perception Wears glasses   Sensory Examination   Sensory Quick Adds No deficits were identified   Pain Assessment   Patient Currently in Pain Yes, see Vital Sign flowsheet   Range of Motion (ROM)   ROM Comment B UE's WFL   Strength   Strength Comments UE strength WFL    Muscle Tone  Assessment   Muscle Tone Quick Adds No deficits were identified   Coordination   Upper Extremity Coordination No deficits were identified   Mobility   Bed Mobility Comments Supine>sit with SBA and vc's, sit>supine with SBA and use of leg     Transfer Skills   Transfer Comments SBA using FWW   Upper Body Dressing   Level of [place]: Dress Upper Body independent   Lower Body Dressing   Level of [place]: Dress Lower Body stand-by assist   Physical Assist/Nonphysical Assist: Dress Lower Body set-up required;verbal cues   Assistive Device reacher;sock-aid;long-handled shoe horn   Toileting   Level of [place]: Toilet stand-by assist   Physical Assist/Nonphysical Assist: Toilet set-up required   Grooming   Level of [place]: Grooming independent   Eating/Self Feeding   Level of [place]: Eating independent   Activities of Daily Living Analysis   Impairments Contributing to Impaired Activities of Daily Living balance impaired;pain;post surgical precautions;ROM decreased;strength decreased   General Therapy Interventions   Planned Therapy Interventions ADL retraining;transfer training   Clinical Impression   Criteria for Skilled Therapeutic Interventions Met yes, treatment indicated   OT Diagnosis Decreased functional mobility and ADLs   Influenced by the following impairments pain, post op precautions   Assessment of Occupational Performance 3-5 Performance Deficits   Identified Performance Deficits dressing, bathing, toileting, home mgmt   Clinical Decision Making (Complexity) Low complexity   Therapy Frequency Other (see comments)  (one time eval and treat)   Predicted Duration of Therapy Intervention (days/wks) 1 day   Anticipated Equipment Needs at Discharge reacher;sock aide;other (see comments)  (leg )   Anticipated Discharge Disposition Home with Assist   Risks and Benefits of Treatment have been explained. Yes   Patient, Family & other staff in agreement with plan of care Yes  "  [facility] TM \"6 Clicks\"   2016, Trustees of [facility], under license to [employer].  All rights reserved.   6 Clicks Short Forms Daily Activity Inpatient Short Form   [facility]  \"6 Clicks\" Daily Activity Inpatient Short Form   1. Putting on and taking off regular lower body clothing? 4 - None   2. Bathing (including washing, rinsing, drying)? 3 - A Little   3. Toileting, which includes using toilet, bedpan or urinal? 4 - None   4. Putting on and taking off regular upper body clothing? 4 - None   5. Taking care of personal grooming such as brushing teeth? 4 - None   6. Eating meals? 4 - None   Daily Activity Raw Score (Score out of 24.Lower scores equate to lower levels of function) 23   Total Evaluation Time   Total Evaluation Time (Minutes) 8     "

## 2020-09-23 NOTE — OP NOTE
Orthopedic Operative Report    Date of Surgery: 9/22/2020    Preoperative Diagnosis:   1. End-stage right hip osteoarthritis  2. acetabular protrusio  3. Radiation induced osteonecrosis  4. Radiation induced insufficiency fractures pelvis   5. Morbid obesity, Body mass index is 35.84 kg/m .    Postoperative Diagnosis: Same    Procedure:  1. Right total hip arthroplasty  2. femoral head autograft reconstruction of acetabular protrusio    Primary Surgeon: Emmanuel Box MD    First Assistant: Linette Milan MD, PGY-4, Maurilio Redd MD, fellow    Anesthesia: Spinal with conscious sedation, converted to general endotracheal anesthesia after run of ventricular tachycardia    Complications:   1. Run of ventricular tachycardia requiring conversion to general endotracheal anesthesia  2. Near acetabular protrusio requiring acetabular reconstruction using femoral head autograft    Specimens: None    Implants:   1. Biomet RingLoc [place] hip 60 mm cup   2. Biomet RingLoc Acetabular liner size 25 Hi-wall with E1 antioxidant infusion (elevated liner)   3. Biomet 6.5 mm screws (35 mm and 50 mm) self-tapping acetabular screws   4. Biomet Taperloc porous coated stem 14 x 148 mm standard offset  5. Biomet modular head 36 mm, -3 neck     Drains: None      Estimated blood loss: <100 mL     Tourniquet time: none     Intraoperative Findings: acetabular protrusio. Acetabulum reconstructed with contained femoral head morsellized autograft and press-fit cup.     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 Real) with end-stage degenerative disease of the right hip.  This may be attributed to postradiation state.  She has attempted and failed trial of nonoperative management.  She is indicated for  surgical intervention in the form of total hip arthroplasty.    We discussed non-operative and operative treatment options and they elected to proceed with surgery.   The risks, benefits, and alternatives were explained to the patient. They understand that the risks include, but are not limited to anesthesia complications such as stroke, heart attack, and death. Surgical risks include bleeding, infection, pain, scar, need for re-operation, recurrence, damage to surrounding structures including nerves and vessels, DVT, PE. We specifically discussed the risk of fracture given her fragile bone, dislocation, implant failure, implant loosening, infection, need for revision surgery.  We also discussed the potential of the procedure to not alleviate the condition, and potential need for further surgery in the future.    They understand that there is a risk of loss of life, loss of limb, and loss of function. No guarantees were given or implied.  The patient demonstrated understanding of the risks, benefits and alternatives.  All questions were answered satisfactorily. They consented to proceed.     Description of the Procedure:   The patient was met in the preoperative holding area on the day of the procedure where the operative site was identified and marked with indelible ink according to hospital policy.  Written consent was confirmed.  Patient was then brought back to the operating theater with anesthesia.  Spinal anesthesia was first administered.  Conscious sedation was then induced.  The patient was then positioned in lateral decubitus with [place] hip positioners.  All bony prominences were well-padded.  Sequential compression devices placed on the nonoperative leg. Straps were used to secure the patient to the operating table.  Notably, midway through the case, the patient demonstrated a run of ventricular tachycardia and inability to maintain sedation.  Thus, the patient was converted to general endotracheal  anesthesia. The operative site was then prepped and draped in usual sterile fashion. Antibiotics were confirmed to have been infused.    A surgical timeout was then conducted according hospital policy confirming correct patient, procedure, extremity and laterality.  All in the room were in agreement, and this was concordant with the surgical site marking and displayed imaging in the room.    The procedure first commenced with a posterolateral approach to the hip.  The skin was incised sharply and the dissection was carried down through the subcutaneous layer using Bovie cautery.  The fascia was identified and incised in line with the skin incision.  The fibers of the gluteus rudi muscle were bluntly dissected.  A bursal plane was developed and bursal tissue was dissected posteriorly to expose the underlying short external rotators.  It should be noted that the exposure dissection were extremely difficult as a result of the scar tissue bed from the patient's prior external beam radiation treatments.  The gluteus medius and minimus muscles were retracted proximally.  The iliopsoas tendon and femoral bundle were retracted distally.  The piriformis tendon was dissected off the trochanter followed by the short external rotators including the upper border of the quadratus femoris. These were tagged with #1 ethibond. Encountered vessels were coagulated by means of Bovie cautery and tied off with 2-0 silk ties.  A trapdoor capsulotomy was then performed and corners of the capsule were tagged with #1 ethibond.  The hip was then dislocated.  Notably, there was significant difficulty in exposure given anterior capsular tightness.    The femoral neck was cleared of soft tissue.  A 45 degrees femoral neck cut was completed approximately 1 fingerbreadth proximal to the lesser trochanter.  The femoral head was removed and crushed cancellus autograft was harvested.  The acetabulum was then exposed.  Remnants of the labrum were  dissected.  The transverse acetabular ligament was identified and calcified.  There was no significant pulvinar to evacuate.  Significant deformity of the acetabulum was noted, consistent with preoperative x-rays.  The medial wall was visible without dissection.  Given the dysplastic and deformed acetabulum, it was clear that extended reconstruction would be necessary.  There is a large deep cavity within the acetabulum as result of the acetabular protrusio.  For this reason, the large medial defect within the acetabulum had to be grafted.  Otherwise the acetabular shell will be placed in a excessive medial position for the center of rotation, and an excessively large diameter acetabular shell will be required.     Attention was then directed towards reaming of the acetabulum.  Sequential reaming was initiated, outlining the reamer head at the anatomic center of rotation of the hip.  Reaming was continued sequentially until consistent contact with the newly established rim was found, corresponding to a size 59 reamer.  A size 40 reamer was then taken and used to roughen up the remainder of the acetabulum in preparation for bone grafting. Bleeding cancellous bone was noted.     Attention was then turned towards preparation of the femur.  A box cutting guide was used to remove residual femoral neck.  The femoral canal finder was then introduced.  Sequential broaching was then initiated.  A size 14 stem was found to have stable fit.    Attention was then turned back towards reconstruction of the acetabulum.  Crushed cancellous femoral head autograft that was prepared in the back table was then impacted into the acetabular defect using reverse reaming. A size 60 trial acetabular liner was chosen and placed into the defect.  A standard neck and size 36 mm head trial were placed and a trial reduction was performed.  Leg lengths were grossly assessed in the operative leg was found to be longer.  A -3 trial head was then  chosen and trial reduction was then performed.  The hip was found to be stable to external rotation with hip in neutral flexion; 90 degrees hip flexion with 60 degrees internal rotation; and sleepers position.  An intraoperative AP pelvis x-ray was obtained and demonstrated satisfactory position, length and alignment of the implants.  Final components were then opened.    Attention was then turned towards components.  The hip joint was thoroughly irrigated with Pulsavac irrigation and then dried.  A size 60 acetabular liner was then placed.  2 acetabular screws were placed in the posterior lateral acetabulum.  An augmented polyethylene spacer was chosen and implanted.  The femoral stem was then impacted into place.  A -3 femoral head was then impacted.  The hip was reduced and again found to be stable.    Attention was then turned towards wound closure.  The short external rotator tendons were then repaired to the trochanter using #1 Ethibond suture.  The capsule was closed with interrupted 0 Vicryl suture.  The fascial layer was closed with interrupted 0 Vicryl suture.  The Sharpey fibers in the subcutaneous layer were then closed with 2-0 Vicryl.  The skin was closed with running 3-0 PDS suture.  Steri-Strips were applied.  Aquacel dressing was applied.  A hip abduction pillow was applied.    All sponge and needle counts were correct at the end of the case.  Dr. Box was present and scrubbed for all critical portions of the case.  There were no apparent complications at the end the case. The patient was then awakened from general anesthesia and brought to the postanesthesia care unit in good condition for recovery.     Postoperative Plan:   Patient will be weightbearing as tolerated on the operative extremity.  Portable x-rays of the operative hip to be obtained in PACU.  Aquacel dressing will remain in place for 7 days.  Antibiotics to be continued 24 hours preoperatively.  Physical therapy to commence on  postoperative day 0 or 1.  Transition from IV to p.o. analgesics as soon as tolerated.  DVT prophylaxis via 162 mg aspirin daily for 4-week course.  Diet to be advanced to normal as tolerated.  Patient will follow-up in 4 weeks with Dr. Box with video visit, and then 3 months with x-rays of the right hip 2 views    The -22 modifier is appended to the CPT coding for this surgery due to the following reasons:  1) The patient had previous radiation to the site which resulted in severe fibrosis and increased the level of difficulty of the dissection.  2) the acetabulum required reconstruction with femoral head autograft before component implantation could commence  3) the complexity of the case lengthen the duration of the case to 4 hours 14 minutes which is 50% greater time than normally associated with this procedure.    Linette Milan MD  Resident Surgeon, PGY-4  Orthopedic Surgery           Attending MD (Dr. Emmanuel Box) Attestation:  I was present during the key portions of the procedure and I was immediately available for the entire procedure between opening and closing.    Emmanuel Box MD  [facility] Family Professor  Oncology and Adult Reconstructive Surgery  Dept Orthopaedic Surgery, [facility]

## 2020-09-23 NOTE — PHARMACY-ADMISSION MEDICATION HISTORY
Admission Medication History Completed by Pharmacy    See [facility] Admission Navigator for allergy information, preferred outpatient pharmacy, prior to admission medications and immunization status.     Medication History Sources:     Patient and [employer]s dispense history.    Changes made to PTA medication list (reason):    Added:   o Fluticasone nasal PRN (per patient)    Deleted:   o B-complex (per patient)  o Fish Oil (per patient)    Changed:   o Venlafaxine ER 75 cap--> venlafaxine 37.5 tab BID    Additional Information:    Patient was a reliable historian and knew her medication dose/frequencies without being prompted.       Prior to Admission medications    Medication Sig Last Dose Taking? Auth Provider   acetaminophen (TYLENOL) 500 MG tablet Take 500-1,000 mg by mouth 2 times daily 9/22/2020 at 0700 Yes Unknown, Entered By History   aspirin (ASA) 81 MG EC tablet Take 1 tablets (81 mg) daily  Yes Gladys Brasher APRN CNS   colestipol (COLESTID) 1 g tablet Take 2 g by mouth 2 times daily  9/22/2020 at 0845 Yes Reported, Patient   loratadine (CLARITIN) 10 MG tablet Take 10 mg by mouth daily 9/22/2020 at 0700 Yes Unknown, Entered By History   omeprazole (PRILOSEC) 20 MG DR capsule Take 20 mg by mouth daily 9/22/2020 at 0700 Yes Unknown, Entered By History   venlafaxine (EFFEXOR) 37.5 MG tablet Take 37.5 mg by mouth 2 times daily 9/22/2020 at 0700 Yes Unknown, Entered By History   VITAMIN MIXTURE PO Place 1 patch onto the skin daily multiplus 9/21/2020 at Unknown time Yes Reported, Patient   fluticasone (FLONASE) 50 MCG/ACT nasal spray Spray 1 spray into both nostrils daily as needed for rhinitis or allergies Unknown at Unknown time  Unknown, Entered By History       Date completed: 09/23/20    Medication history completed by: Nola Olivera Pharmacy Student

## 2020-09-23 NOTE — PLAN OF CARE
Discharge Planner PT   Patient plan for discharge: Home with assist  Current status: PT evaluation and 1 time treatment complete. Supine in bed upon arrival, agreeable to PT. Educated on precautions following surgery, demonstrates good understanding overall. Completes supine to sit transfer with supervision, able to manage R LE without assist. Sat EOB with good tolerance. Completes sit<>stand transfer with supervision and use of walker. Ambulates in marshall with supervision using 4WW, safe throughout. Issued and reviewed HEP per LACEY protocol. Ended in chair with needs in reach.   Barriers to return to prior living situation: No PT barriers  Recommendations for discharge: Home with assist and eventual OP PT  Rationale for recommendations: For return to PLOF    Physical Therapy Discharge Summary    Reason for therapy discharge:    All goals and outcomes met, no further needs identified.    Progress towards therapy goal(s). See goals on Care Plan in [facility] electronic health record for goal details.  Goals met    Therapy recommendation(s):    Continued therapy is recommended.  Rationale/Recommendations:  Eventual OP PT.           Entered by: Aletha Gibbs 09/23/2020 9:08 AM

## 2020-09-23 NOTE — PROGRESS NOTES
09/23/20 0838   Quick Adds   Type of Visit Initial PT Evaluation       Present no   Language English   Living Environment   Lives With spouse   Living Arrangements house   Home Accessibility stairs to enter home;stairs within home   Number of Stairs, Main Entrance 2   Stair Railings, Main Entrance railings on both sides of stairs   Number of Stairs, Within Home, Primary 10   Stair Railings, Within Home, Primary railing on left side (ascending)   Transportation Anticipated family or friend will provide   Living Environment Comment Patient will have 24 hour assist from spouse   Self-Care   Usual Activity Tolerance good   Current Activity Tolerance moderate   Equipment Currently Used at Home walker, rolling   Functional Level Prior   Ambulation 1-->assistive equipment   Transferring 1-->assistive equipment   Toileting 0-->independent   Bathing 0-->independent   Communication 0-->understands/communicates without difficulty   Swallowing 0-->swallows foods/liquids without difficulty   Cognition 0 - no cognition issues reported   Fall history within last six months yes   Number of times patient has fallen within last six months 1   Which of the above functional risks had a recent onset or change? ambulation;transferring   General Information   Onset of Illness/Injury or Date of Surgery - Date 09/22/20   Referring Physician Linette Milan MD    Patient/Family Goals Statement Did not endorse   Pertinent History of Current Problem (include personal factors and/or comorbidities that impact the POC) 64 year old female s/p R LACEY on 9/22 with Dr. Box. Doing well.   Precautions/Limitations fall precautions;right hip precautions   Weight-Bearing Status - LUE full weight-bearing   Weight-Bearing Status - RUE full weight-bearing   Weight-Bearing Status - LLE full weight-bearing   Weight-Bearing Status - RLE weight-bearing as tolerated   General Observations Supine in bed upon arrival, agreeable to PT    General Info Comments IV   Cognitive Status Examination   Orientation orientation to person, place and time   Level of Consciousness alert   Follows Commands and Answers Questions 100% of the time;able to follow multistep instructions   Personal Safety and Judgment intact   Memory intact   Pain Assessment   Patient Currently in Pain Yes, see Vital Sign flowsheet   Integumentary/Edema   Integumentary/Edema Comments Patient with normal post-surgical swelling present to R LE   Posture    Posture Forward head position;Protracted shoulders;Kyphosis   Posture Comments Mild sitting EOB and standing   Range of Motion (ROM)   ROM Comment Did not formally assess, demonstrates functional ROM within precautions   Strength   Strength Comments Did not formally assess, demonstrates functional strength with mobility   Bed Mobility   Bed Mobility Comments Completes supine to sit transfer with supervision, able to manage R LE on own   Transfer Skills   Transfer Comments Completes sit<>stand transfer with supervision and use of walker   Gait   Gait Comments Tolerates ambulating in marshall with supervision and use of walker, safe throughout   Balance   Balance Comments Independent sitting balance, SBA for standing balance with UE support from walker   Sensory Examination   Sensory Perception no deficits were identified   General Therapy Interventions   Planned Therapy Interventions balance training;bed mobility training;gait training;ROM;strengthening;transfer training;risk factor education;home program guidelines;progressive activity/exercise   Clinical Impression   Criteria for Skilled Therapeutic Intervention yes, treatment indicated   PT Diagnosis impaired functional mobility   Influenced by the following impairments increased post-op pain, precautions, decreased R LE strength and ROM   Functional limitations due to impairments impaired bed mobility, transfers and ambulation   Clinical Presentation Stable/Uncomplicated   Clinical  "Presentation Rationale s/p R LACEY, mobilizing well   Clinical Decision Making (Complexity) Low complexity   Therapy Frequency   (1 time eval and treat)   Predicted Duration of Therapy Intervention (days/wks) 1 day   Anticipated Equipment Needs at Discharge   (has walker)   Anticipated Discharge Disposition Home with Assist;Home with Outpatient Therapy  (Eventual OP PT)   Risk & Benefits of therapy have been explained Yes   Patient, Family & other staff in agreement with plan of care Yes   [facility] [employer] \"6 Clicks\"   2016, Trustees of [facility], under license to [employer].  All rights reserved.   6 Clicks Short Forms Basic Mobility Inpatient Short Form   [facility] AM-PAC  \"6 Clicks\" V.2 Basic Mobility Inpatient Short Form   1. Turning from your back to your side while in a flat bed without using bedrails? 4 - None   2. Moving from lying on your back to sitting on the side of a flat bed without using bedrails? 4 - None   3. Moving to and from a bed to a chair (including a wheelchair)? 4 - None   4. Standing up from a chair using your arms (e.g., wheelchair, or bedside chair)? 4 - None   5. To walk in hospital room? 4 - None   6. Climbing 3-5 steps with a railing? 4 - None   Basic Mobility Raw Score (Score out of 24.Lower scores equate to lower levels of function) 24   Total Evaluation Time   Total Evaluation Time (Minutes) 8     "

## 2020-09-23 NOTE — PLAN OF CARE
Discharge Planner OT   Patient plan for discharge: home with assist   Current status: Patient ambulated in hallway using 4ww with SBA-supervision. Completed LE dressing using AE, toilet transfer, and tub transfer with SBA, maintaining precautions throughout session. Ordered reacher, sock aide, and leg  for home, other DME in place.   Barriers to return to prior living situation: none from OT standpoint  Recommendations for discharge: home with assist from    Rationale for recommendations: patient has met OT goals, will have assist as needed at home.        Entered by: Beti Hays 09/23/2020 10:35 AM    Occupational Therapy Discharge Summary    Reason for therapy discharge:    All goals and outcomes met, no further needs identified.    Progress towards therapy goal(s). See goals on Care Plan in [facility] electronic health record for goal details.  Goals met    Therapy recommendation(s):    No further therapy is recommended.

## 2020-09-23 NOTE — DISCHARGE SUMMARY
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, Dr. Real) with end-stage degenerative disease of the right hip.  This may be attributed to postradiation state.  She has attempted and failed trial of nonoperative management.  She is indicated for surgical intervention in the form of total hip arthroplasty.     We discussed non-operative and operative treatment options and they elected to proceed with surgery.   The risks, benefits, and alternatives were explained to the patient. They understand that the risks include, but are not limited to anesthesia complications such as stroke, heart attack, and death. Surgical risks include bleeding, infection, pain, scar, need for re-operation, recurrence, damage to surrounding structures including nerves and vessels, DVT, PE. We specifically discussed the risk of fracture given her fragile bone, dislocation, implant failure, implant loosening, infection, need for revision surgery.  We also discussed the potential of the procedure to not alleviate the condition, and potential need for further surgery in the future.     They understand that there is a risk of loss of life, loss of limb, and loss of function. No guarantees were given or implied.  The patient demonstrated understanding of the risks, benefits and alternatives.  All questions were answered satisfactorily. They consented to proceed.     HOSPITAL COURSE:    The patient  was admitted following the above listed procedures for pain control and rehabilitation. Mary Lou Connelly did well post-operatively. Medicine was consulted post operatively to aid in management of medical co-morbidities. Immediate postoperative ECG did not demonstrate acute aberrancies and trended troponin was within normal limits. The patient received routine nursing cares and at the time of discharge was medically stable. Vital signs were stable throughout admission. The patient is tolerating a regular diet and is voiding spontaneously. All PT/OT goals have been met for safe mobility. Pain is now controlled on oral medications which will be available on discharge. Stool softeners have been used while taking pain medications to help prevent constipation. Mary Lou Connelly is deemed medically safe to discharge.     Antibiotics:  ancef given periop and 24 hours postop.   DVT prophylaxis:  Aspirin 162 mg daily initiated after surgery and will be continued for 4 weeks.   PT Progress:  Has met PT/OT goals for safe mobility.    Pain Meds:  Weaned off all IV pain meds by discharge.  Inpatient Events: No significant events or complications.     PHYSICAL EXAM:    Gen: No acute distress, resting comfortably in bed.  Resp: Non-labored breathing  MSK:  LE:  - Surgical dressings R hip c/d/i with 1.5x3 cm strikethrough midway along incision  - Abductor pillow in place   - SILT femoral/tibial/sural/saphenous/DP/SP nerves  - Fires TA, EHL, FHL, GaSC  - PT/DP pulses 2+, foot wwp    FOLLOWUP:    Follow up with Dr. Box at 4 weeks postoperatively.    Future Appointments   Date Time Provider Department Center   9/23/2020  6:30 AM UR OT OVERFLOW [facility]   9/23/2020  6:30 AM UR PT OVERFLOW [facility] [place]       Orthopaedic Surgery appointments are at the [facility] ([address] [place] [address]). Call [phone] to schedule a follow-up appointment at this location with your provider.     PLANNED  DISCHARGE ORDERS:      Discharge Medication List as of 9/23/2020  1:05 PM      START taking these medications    Details   aspirin (ASA) 81 MG EC tablet Take 2 tablets (162 mg) by mouth daily, Disp-28 tablet,R-0, E-Prescribe      oxyCODONE (ROXICODONE) 5 MG tablet Take 1-2 tablets (5-10 mg) by mouth every 4 hours as needed, Disp-30 tablet,R-0, E-Prescribe      polyethylene glycol (MIRALAX) 17 g packet Take 17 g by mouth daily, Disp-7 packet,R-0, E-Prescribe      senna-docusate (SENOKOT-S/PERICOLACE) 8.6-50 MG tablet Take 2 tablets by mouth 2 times daily, Disp-30 tablet,R-0, E-Prescribe         CONTINUE these medications which have CHANGED    Details   acetaminophen (TYLENOL) 325 MG tablet Take 2 tablets (650 mg) by mouth every 4 hours as needed for other, Disp-1 Bottle,R-0, E-PrescribeDischarge today      aspirin (ASA) 81 MG chewable tablet Take 1 tablet (81 mg) by mouth daily, Historical         CONTINUE these medications which have NOT CHANGED    Details   colestipol (COLESTID) 1 g tablet Take 2 g by mouth 2 times daily , Historical      loratadine (CLARITIN) 10 MG tablet Take 10 mg by mouth daily, Historical      omeprazole (PRILOSEC) 20 MG DR capsule Take 20 mg by mouth daily, Historical      venlafaxine (EFFEXOR) 37.5 MG tablet Take 37.5 mg by mouth 2 times daily, Historical      VITAMIN MIXTURE PO Place 1 patch onto the skin daily multiplus, Historical      fluticasone (FLONASE) 50 MCG/ACT nasal spray Spray 1 spray into both nostrils daily as needed for rhinitis or allergies, Historical         STOP taking these medications       Omega-3 Fatty Acids (FISH OIL PO) Comments:   Reason for Stopping:         venlafaxine (EFFEXOR-XR) 75 MG 24 hr capsule Comments:   Reason for Stopping:                 Discharge Procedure Orders   Reason for your hospital stay   Order Comments: You were admitted following your total hip arthroplasty     Discharge Instructions   Order Comments: TOTAL HIP ARTHROPLASTY POST OPERATIVE  DISCHARGE INSTRUCTIONS    FOLLOW UP APPOINTMENT  You are scheduled for a post operative wound check with Dr. Chaudhari's clinic approximately two weeks after surgery. At approximately six weeks after surgery, you will see Dr. Chaudhari in clinic. During this visit, repeat X rays of your operative hip will be performed.      Your follow up appointments will be at the location that you regularly see Dr. Chaudhari:    [facility]  [address] [place] [address] [phone]    [facility]  [address] [place] [address] [phone]    Physical therapy:   A prescription for physical therapy will be provided at the time of discharge.       ACTIVITY  Weight bearing status:   You may bear weight on your operative extremity as tolerated, using assistive devices (walker, cane) as needed. As you begin to feel more comfortable ambulating, you may gradually transition from a walker to a cane. Eventually, you should wean from all assistive devices. Although we would like you to discontinue use of assistive devices as soon as possible, do not transition until you have worked with your physical therapist to achieve safe balance and comfort.     Posterior hip precautions:  You must maintain the following posterior hip precautions for the next 12 weeks with no exceptions:  1) no hip flexion >90 degrees (no bending down at the waist)  2) no internal rotation past neutral (no pivoting)  3) no adduction past midline (no crossing your legs)    Exercises:   Perform the following exercises at least three times per day for the first four weeks after surgery to prevent complications, such as blood clots in your legs:  1) Point and flex your feet  2) Move your ankle around in big circles  3) Wiggle your toes   Also, perform thigh muscle tightening exercises for 10 to 15 minutes at least three times per day for the first four weeks after surgery.    Athletic  Activities:  Activities such as swimming, bicycling, jogging, running, and stop-and-go sports should be avoided until permitted by your provider.    Driving:  Driving is not permitted until directed by your provider. Under no circumstance are you permitted to drive while using narcotic pain medications.    Return to Work:  You may return to work when directed by your provider.       COMFORT AND PAIN MANAGEMENT  Swelling:  Some swelling in the leg is normal after surgery.  This type of swelling is usually gravity dependent and is improved in the morning after sleep. If you begin to experience worsening swelling or calf pain, please contact Dr. Chaudhari's office. We may recommend presenting to the Emergency Department to obtain an ultrasound of the leg if there is concern for a DVT.       Icing:  An ice pack will be provided to control swelling and discomfort after surgery. Place a thin towel on your skin and apply the ice pack overtop. You may apply ice for 20 minutes as often as two times per hour.    Pain Medications:  You will be discharged with acetaminophen (Tylenol) and a narcotic medication for pain management after surgery. Acetaminophen is most effective when it is taken per the schedule outlined by your provider (every four, six, or eight hours as prescribed). You may safely use acetaminophen as prescribed for the first four weeks after surgery provided you do not exceed the maximum daily dose prescribed by your provider (usually 3000 mg - 4000 mg). The narcotic pain medication should only be taken on an as-needed basis when necessary and should be reserved for severe pain that is not controlled with scheduled acetaminophen. In the first three days following surgery, your symptoms may warrant use of the narcotic pain medication every three, four, or six hours as prescribed. After three days, focus your efforts on decreasing (tapering) use of narcotic medications.   The most successful tapering strategy is to  first, decrease the dose (number of tablets) and second, decrease the interval (time between doses). For example, if you begin taking two tablets every four hours after surgery, start your taper by decreasing one of these doses to one tablet. Every one to two days, decrease another dose to one tablet until you are eventually taking one tablet every four hours. Once this is achieved, focus on increasing the number of hours between doses, moving from one tablet every four hours to one tablet every six hours. As tolerated, continue to increase the interval to eight and twelve hours. Eventually, taper to one dose every evening and discontinue when no longer needed.      ANTICOAGULATION  Take the  mg  prescribed for a total of 4 weeks after surgery.  This is given to help minimize your risk of blood clot.      WOUND CARE AND SHOWERING  Wound care:  You have a clean Aquacel dressing on your surgical wound. This dressing should stay in place for seven days to allow the incision to heal. If the Aquacel dressing becomes excessively wet or saturated before removal on day seven, please contact Dr. Chaudhari's office. After seven days, remove the dressing and leave the wound open to air (see showering instructions below). If there is any drainage from the incision after removal of the Aquacel, dress the wound with sterile 4x4 gauze, using tape over top to secure the dressing in place over the incision. Please contact Dr. Chaudhari's office if you notice the following after removal of the Aquacel dressin) significant cloudy, bloody, or malodorous drainage from the incision, 2) excessive warmth and redness around the incision.    Showering:  You may shower 48 hours after surgery provided the Aquacel dressing is in place. Although you are strictly prohibited from soaking or submerging the surgical wound underwater, you may shower in the usual fashion, allowing water and soap to run over the Aquacel. Once the Aquacel is removed  on the seventh day after surgery, you may continue to shower; however, the incision should be covered with saran wrap (or any other non-permeable covering) to allow the incision to remain dry and to prevent you from scrubbing/rubbing the incision. The Dermabond (surgical adhesive that is directly on the incision areas) should be left on until it falls off or is removed at your first office visit. After your incision is examined at your first office visit, you will likely be allowed to return to showering without covering the incision.     Tub Bathing:  Tub bathing, swimming, or any other activities that cause your incision to be submerged should be avoided until allowed by your provider. Typically, patients are allowed to return to these activities six weeks after surgery.      CONTACTING YOUR PHYSICIAN:  You may experience symptoms that require follow-up before your scheduled two week appointment. Please contact Dr. Chaudhari's office if you experience:  1) Pain that persists or worsens in the first few days after surgery  2) Excessive redness or drainage of cloudy or bloody material from the wounds (clear red tinted fluid and some mild drainage should be expected) or drainage of any kind five days after surgery  3) A temperature elevation greater than 101.5    4) Pain, swelling or redness in your calf  5) Numbness or weakness in your leg or foot      Regular business hours (Monday - Friday, 8am - 5pm):  [facility] [place]: [phone]  [facility]: [phone]    After hours and weekends:  [facility] on call Orthopedic resident: [phone]     Red blood cell have available   Standing Status: Future Standing Exp. Date: 08/25/21   Scheduling Instructions: IRRADIATION is NOT Indicated for: (Scroll to see all items.)  Adults only: Acute or chronic leukemia                      Adults only: Non-Hodgkin's lymphoma  Patients on high dose steroids                                      Solid organ transplant patients  Severe leukopenia, lymphopenia, pancytopenia    To prevent HLA alloimmunization  Use of immune suppressants such as                         HIV  Infection/AIDS      Azathioprine, cyclosporine, MMF  Do NOT change or add to this text.  Use additional instructions field.     Order Specific Question Answer Comments   Irradiation Indication Not irradiated    Red Blood Cells: NON-Irradiated    Number of Units 1    Have available for Potential blood loss      Miscellaneous DME Order   Order Comments: DME Documentation:   Describe the reason for need to support medical necessity: s/p R LACEY    I, the undersigned, certify that the above prescribed supplies are medically necessary for this patient and is both reasonable and necessary in reference to accepted standards of medical and necessary in reference to accepted standards of medical practice in the treatment of this patient's condition and is not prescribed as a convenience.     Order Specific Question Answer Comments   DME Provider: [facility]    DME Item Needed: reacher, sock aide, leg     Length of Need: 3 months      Diet   Order Comments: Return to your pre surgical diet     Order Specific Question Answer Comments   Is discharge order? Yes        Linette Milan MD   Orthopaedic Surgery, PGY-4  Pager: [phone]

## 2020-09-23 NOTE — PROGRESS NOTES
[facility], [place]    Medicine Progress Note - Hospitalist Service       Date of Admission:  9/22/2020  Assessment & Plan         Mary Lou Connelly is a 64 year old female admitted on 9/22/2020. She has a known H/O obesity with recent weight loss surgery ( Laparoscopic Sleeve Gastrectomy), with 50 pound weight loss, recent h/o post op AF ( resolved), TANIA on CPAP, resolved HTN ( with weight loss) and h/o anal squamous cell carcinoma ( S/P radiation)  She underwent Rt sided total hip arthroplasty.     Internal medicine consulted for postoperative co management  Individual problems and their management are outlined below           S/P Rt Hip Arthroplasty, POD#1:  Management by primary team. Please see their notes for further details  Discontinue ivf. brad po.   Pain control with acetaminophen 975 mg every 8 hrs for 3 days  Oxycodone 5-10 mg every 3 hrs PRN and IV hydromorphone 0.2.mg q 2 hrs  PRN for breakthrough pain   * further per primary    DVT prophylaxis with SCDs while in hospital and aspirin 162 mg daily for 1 month   Perioperative antibiotics as per primary team     PT/OT as per protocol  Intermittent straight cath for urinary retention    Incentive spirometry and aggressive bowel regimen .  We will monitor for acute blood loss anemia. Pre op Hgb at 12.1 ( Per op H&P)        H/O Atrial fibrillation  HTN  Intraoperative VT ( Brief)    Patient has post op AF after sleeve gastrectomy. She was on a zio patch, which has since been discontinued with no evidence of recurrence of A Fib. Patient's amiodarone was stopped after she was evaluated by cardiology team. She was not on anticoagulation  Echo from 2/2020 with normal systolic and diastolic function  Previously, patient was on Lisinopril, which has been stopped after patient was normotensive with weight loss   Intraoperative VT resolved. Initial troponin was negative    Repeat EKG, trop negative 9/23/2020; pt remains asymptomatic. CE:  prior ekg with TWI too.   Follow-up with outpatient cardiology.      Depression:  Stable  Resume home dose of Venlafaxine 37.5 mg BID        Obesity:  Bile Acid malabsorption   Recent Sleeve gastrectomy with 50 pound weight loss   Resume home dose of Omeprazole   Patient is on a multivitamin patch, which is non formulary. This can be resumed when patient discharges   Resume colestipol for bile acid malabsorptrion     TANIA:  Resume home CPAP         The patient's care was discussed with the Bedside Nurse, Care Coordinator/, Patient and Primary team.    Cesar Garza MD  Hospitalist Service  [facility], [place]    ______________________________________________________________________    Interval History   States doing well  Denies fever or chills.   No cough or cp or sob.   No LH or dizziness.   No NV or pain abdomen.   Flatus+  No new sensory or motor complaint.     No other new or acute medical concern      Data reviewed today: I reviewed all medications, new labs and imaging results over the last 24 hours. I personally reviewed no images or EKG's today.    Physical Exam   Vital Signs: Temp: 99.6  F (37.6  C) Temp src: Oral BP: 134/60 Pulse: 91   Resp: 16 SpO2: 92 % O2 Device: BiPAP/CPAP Oxygen Delivery: 3 LPM  Weight: 208 lbs 12.41 oz  General Appearance: Alert. nad  Respiratory: non labored. CTA  Cardiovascular: s1s2 regular.   GI: soft. nt nd  Skin: no new rash  Other: Distally wwp    Data   Recent Labs   Lab 09/23/20  0559 09/22/20  1639 09/22/20  0944   HGB 9.3*  --   --      --   --    POTASSIUM 4.4  --  4.0   CHLORIDE 102  --   --    CO2 24  --   --    BUN 24  --   --    CR 0.70  --  0.83   ANIONGAP 9  --   --    LYNNETTE 8.8  --   --    *  --  88   ALBUMIN 2.6*  --   --    PROTTOTAL 5.8*  --   --    BILITOTAL 0.4  --   --    ALKPHOS 103  --   --    ALT 55*  --   --    AST 53*  --   --    TROPI <0.015 <0.015  --      Recent Results (from the past 24 hour(s))    XR Pelvis w Hip Port Right G/E 2 Views    Narrative    Exam: Single frontal view of the pelvis and a lateral view of the  right hip dated 9/22/2020.    COMPARISON: Radiographs from the same day.    CLINICAL HISTORY: Status post right total hip arthroplasty.    FINDINGS: Single frontal view of both hips and lateral view of the  right hip was obtained. There are new postsurgical changes of  placement of a right total hip arthroplasty. Hardware appears intact.  Postoperative air is noted. Degenerative changes in the left hip  joint. Bones are osteopenic appearing. Redemonstration of presumed  chronic fracture deformities of the pubic bones.      Impression    IMPRESSION:  1. New post surgical changes of placement of a right total hip  arthroplasty.  2. Osteoarthrosis in the left hip.  3. Chronic fracture deformities unchanged within the pubic bones, on a  background of osteopenic-appearing bones..    LIONEL TAVERAS MD     Medications

## 2020-09-24 ENCOUNTER — TELEPHONE (OUTPATIENT)
Dept: SLEEP MEDICINE | Facility: CLINIC | Age: 65
End: 2020-09-24

## 2020-09-25 NOTE — PROGRESS NOTES
Patient was called three times and no answer so post 24 hr DC follow up calls will be closed out, message was left with contact number for department seen by or following up     CONTACTING YOUR PHYSICIAN:  You may experience symptoms that require follow-up before your scheduled two week appointment. Please contact Dr. Chaudhari's office if you experience:  1) Pain that persists or worsens in the first few days after surgery  2) Excessive redness or drainage of cloudy or bloody material from the wounds (clear red tinted fluid and some mild drainage should be expected) or drainage of any kind five days after surgery  3) A temperature elevation greater than 101.5    4) Pain, swelling or redness in your calf  5) Numbness or weakness in your leg or foot        Regular business hours (Monday - Friday, 8am - 5pm):  [facility] [place]: [phone]  [facility]: [phone]     After hours and weekends:  [facility] on call Orthopedic resident: [phone]

## 2020-09-26 LAB
BLD PROD TYP BPU: NORMAL
BLD UNIT ID BPU: 0
BLOOD PRODUCT CODE: NORMAL
BPU ID: NORMAL
TRANSFUSION STATUS PATIENT QL: NORMAL
TRANSFUSION STATUS PATIENT QL: NORMAL

## 2020-10-14 ENCOUNTER — OFFICE VISIT (OUTPATIENT)
Dept: ORTHOPEDICS | Facility: CLINIC | Age: 65
End: 2020-10-14
Payer: COMMERCIAL

## 2020-10-14 ENCOUNTER — NURSE TRIAGE (OUTPATIENT)
Dept: NURSING | Facility: CLINIC | Age: 65
End: 2020-10-14

## 2020-10-14 VITALS — BODY MASS INDEX: 35.51 KG/M2 | WEIGHT: 208 LBS | HEIGHT: 64 IN

## 2020-10-14 DIAGNOSIS — L08.9 SKIN INFECTION: Primary | ICD-10-CM

## 2020-10-14 PROCEDURE — 99024 POSTOP FOLLOW-UP VISIT: CPT | Performed by: FAMILY MEDICINE

## 2020-10-14 ASSESSMENT — MIFFLIN-ST. JEOR: SCORE: 1488.48

## 2020-10-14 NOTE — LETTER
"    10/14/2020         RE: Mary Lou Connelly  [address]  [place] [address]        Dear Colleague,    Thank you for referring your patient, Mary Lou Connelly, to the [facility] [place]. Please see a copy of my visit note below.          SPORTS & ORTHOPEDIC WALK-IN VISIT 10/14/2020    Primary Care Physician: Dr. Herring    Underwent right hip LACEY with Dr. Box on 9/22/2020.  Recovery has been uneventful and she has done fairly well.  On 10/12/20 - was getting into her car and felt something \"pull\" near her incision site. As she got to where she was going, she noticed that her side was slightly wet.  Since that time she has had some persistent clear pinkish drainage from an opening in the incision.  Soaked through a towel that she was sitting on in her vehicle.  Seems to drain less when she is standing.  Seemed to be improving yesterday however this morning she noticed a subsequent increase in drainage.  Here today to be evaluated.  She has not noted any increase in pain, redness or warmth from the area.  She is otherwise felt well    Reason for visit:     What part of your body is injured / painful?  right hip    What caused the injury /pain? Getting into car    How long ago did your injury occur or pain begin? several days ago    What are your most bothersome symptoms? Other: leaking    How would you characterize your symptom?  aching    What makes your symptoms better? Wrap or brace    What makes your symptoms worse? Movement    Have you been previously seen for this problem? No    Medical History:    Any recent changes to your medical history? No    Any new medication prescribed since last visit? No    Have you had surgery on this body part before? Yes Date: 9/22/20 - hip arthroplasty (no issues since surgery)    Social History:    Occupation: accounting    Handedness: Right    Exercise: HEP, less than usual    Review of Systems:    Do you have fever, chills, weight " "loss? Yes, bariatric sleeve surgery -50lbs    Do you have any vision problems? No    Do you have any chest pain or edema? No    Do you have any shortness of breath or wheezing?  No    Do you have stomach problems? No    Do you have any numbness or focal weakness? No    Do you have diabetes? No    Do you have problems with bleeding or clotting? No    Do you have an rashes or other skin lesions? No         Past Medical History, Current Medications, and Allergies are reviewed in the electronic medical record as appropriate.       EXAM:Ht 1.626 m (5' 4\")   Wt 94.3 kg (208 lb)   BMI 35.70 kg/m      General: Alert, pleasant, no distress  Right hip: Otherwise well-healed incision with roughly 1 cm area of slight dehiscence over the anterior portion.  There is no surrounding erythema fluctuance or induration.  There is some scant but persistent serosanguineous drainage.  No purulent discharge.  There is no tenderness to palpation.          Imaging: No new imaging this visit    Assessment: Patient is a 64 year old female 3 weeks status post right hip LACEY doing well except for relatively recent small area of wound dehiscence and drainage.  Does not overtly appear infectious however somewhat concerning that she is having this happen over 3 weeks out from injury.    Recommendations:   Reviewed imaging and assessment with the patient in detail  Discussed with Dr. Box and Dr. Hawley in clinic.  She was instructed on wound care instructions and will initiated on Augmentin twice daily for the next week.  She has follow-up previously scheduled with Dr. Box in 1 week.  She will call or follow-up sooner should she develop any increase in drainage, pain redness or warmth from the incision, or systemic symptoms such as fevers or chills.  Patient is comfortable and understands this plan.    Valentín Maria MD      "

## 2020-10-14 NOTE — PROGRESS NOTES
"      SPORTS & ORTHOPEDIC WALK-IN VISIT 10/14/2020    Primary Care Physician: Dr. Herring    Underwent right hip LACEY with Dr. Box on 9/22/2020.  Recovery has been uneventful and she has done fairly well.  On 10/12/20 - was getting into her car and felt something \"pull\" near her incision site. As she got to where she was going, she noticed that her side was slightly wet.  Since that time she has had some persistent clear pinkish drainage from an opening in the incision.  Soaked through a towel that she was sitting on in her vehicle.  Seems to drain less when she is standing.  Seemed to be improving yesterday however this morning she noticed a subsequent increase in drainage.  Here today to be evaluated.  She has not noted any increase in pain, redness or warmth from the area.  She is otherwise felt well    Reason for visit:     What part of your body is injured / painful?  right hip    What caused the injury /pain? Getting into car    How long ago did your injury occur or pain begin? several days ago    What are your most bothersome symptoms? Other: leaking    How would you characterize your symptom?  aching    What makes your symptoms better? Wrap or brace    What makes your symptoms worse? Movement    Have you been previously seen for this problem? No    Medical History:    Any recent changes to your medical history? No    Any new medication prescribed since last visit? No    Have you had surgery on this body part before? Yes Date: 9/22/20 - hip arthroplasty (no issues since surgery)    Social History:    Occupation: accounting    Handedness: Right    Exercise: HEP, less than usual    Review of Systems:    Do you have fever, chills, weight loss? Yes, bariatric sleeve surgery -50lbs    Do you have any vision problems? No    Do you have any chest pain or edema? No    Do you have any shortness of breath or wheezing?  No    Do you have stomach problems? No    Do you have any numbness or focal weakness? No    Do you " "have diabetes? No    Do you have problems with bleeding or clotting? No    Do you have an rashes or other skin lesions? No         Past Medical History, Current Medications, and Allergies are reviewed in the electronic medical record as appropriate.       EXAM:Ht 1.626 m (5' 4\")   Wt 94.3 kg (208 lb)   BMI 35.70 kg/m      General: Alert, pleasant, no distress  Right hip: Otherwise well-healed incision with roughly 1 cm area of slight dehiscence over the anterior portion.  There is no surrounding erythema fluctuance or induration.  There is some scant but persistent serosanguineous drainage.  No purulent discharge.  There is no tenderness to palpation.          Imaging: No new imaging this visit    Assessment: Patient is a 64 year old female 3 weeks status post right hip LACEY doing well except for relatively recent small area of wound dehiscence and drainage.  Does not overtly appear infectious however somewhat concerning that she is having this happen over 3 weeks out from injury.    Recommendations:   Reviewed imaging and assessment with the patient in detail  Discussed with Dr. Box and Dr. Hawley in clinic.  She was instructed on wound care instructions and will initiated on Augmentin twice daily for the next week.  She has follow-up previously scheduled with Dr. Box in 1 week.  She will call or follow-up sooner should she develop any increase in drainage, pain redness or warmth from the incision, or systemic symptoms such as fevers or chills.  Patient is comfortable and understands this plan.    Valentín Maria MD            "

## 2020-10-14 NOTE — TELEPHONE ENCOUNTER
Call from Mary Lou, Monday sliding into car, felt something pull at incision.  Her pants became wet, thought she had sat in something which caused this, then noticed more wetness as time went on.  Tuesday after her shower experienced leaking from incision, made her shorts wet.  Drainage is light yellow with light blood.  No open areas found along incision, unable to tell where it is draining from.  Denies fever, redness, swelling, increased pain. This morning has very little drainage, has not yet showered.  Appointment scheduled for today with ortho walk in clinic      Additional Information    Negative: Major abdominal surgical incision and wound gaping open with visible internal organs    Negative: Sounds like a life-threatening emergency to the triager    Negative: Bleeding from incision and won't stop after 10 minutes of direct pressure    Negative: Widespread rash and bright red, sunburn-like    Negative: Severe pain in the incision    Negative: Incision gaping open and < 2 days (48 hours) since wound re-opened    Negative: Incision gaping open and length of opening > 2 inches (5 cm)    Negative: Patient sounds very sick or weak to the triager    Negative: Sounds like a serious complication to the triager    Negative: Fever > 100.4 F (38.0 C)    Negative: Incision looks infected (spreading redness, pain)    Negative: Red streak runs from the incision and longer than 1 inch (2.5 cm)    Negative: Pus or bad-smelling fluid draining from incision    Negative: Dressing soaked with blood or body fluid (e.g., drainage)    Negative: Raised bruise and size > 2 inches (5 cm) and expanding    Negative: Caller has URGENT question and triager unable to answer question    Negative: Incision gaping open and length of opening > 1/4 inch (6 mm) and on the face and over 2 days since wound re-opened    Negative: Incision gaping open and length of opening > 1/2 inch (1 cm) and over 2 days since wound re-opened    Clear or  "blood-tinged fluid draining from wound and no fever    Answer Assessment - Initial Assessment Questions  1. SYMPTOM: \"What's the main symptom you're concerned about?\" (e.g., redness, pain, drainage)      drainage  2. ONSET: \"When did drainage  start?\"      Monday after slid into the car and felt something pull  3. SURGERY: \"What surgery was performed?\"      Right total hip arthroplasty  4. DATE of SURGERY: \"When was surgery performed?\"       9/22/20  5. INCISION SITE: \"Where is the incision located?\"       Right hip  6. REDNESS: \"Is there any redness at the incision site?\" If yes, ask: \"How wide across is the redness?\" (Inches, centimeters)       None  7. PAIN: \"Is there any pain?\" If so, ask: \"How bad is it?\"  (Scale 1-10; or mild, moderate, severe)      mild  8. BLEEDING: \"Is there any bleeding?\" If so, ask: \"How much?\" and \"Where?\"      Drainage is light yellow with light blood  9. DRAINAGE: \"Is there any drainage from the incision site?\" If yes, ask: \"What color and how much?\" (e.g., red, cloudy, pus; drops, teaspoon)      Yes, light yellow with light blood.  Monday it made her jeans wet.  Happened again yesterday morning after shower, drainage made her shorts wet.  Today has had very little drainage, has not yet showered  10. FEVER: \"Do you have a fever?\" If so, ask: \"What is your temperature, how was it measured, and when did it start?\"        Denies  11. OTHER SYMPTOMS: \"Do you have any other symptoms?\" (e.g., shaking chills, weakness, rash elsewhere on body)        Denies redness, increased warmth, cannot see any open areas along incision, unable to tell where it is draining from.    Protocols used: POST-OP INCISION SYMPTOMS AND [id number]-VD      "

## 2020-10-16 ENCOUNTER — DOCUMENTATION ONLY (OUTPATIENT)
Dept: OTHER | Facility: CLINIC | Age: 65
End: 2020-10-16

## 2020-10-20 DIAGNOSIS — Z98.890 STATUS POST HIP SURGERY: Primary | ICD-10-CM

## 2020-10-22 ENCOUNTER — ANCILLARY PROCEDURE (OUTPATIENT)
Dept: GENERAL RADIOLOGY | Facility: CLINIC | Age: 65
End: 2020-10-22
Attending: ORTHOPAEDIC SURGERY
Payer: COMMERCIAL

## 2020-10-22 ENCOUNTER — OFFICE VISIT (OUTPATIENT)
Dept: ORTHOPEDICS | Facility: CLINIC | Age: 65
End: 2020-10-22
Payer: COMMERCIAL

## 2020-10-22 DIAGNOSIS — Z96.641 HISTORY OF RIGHT HIP REPLACEMENT: Primary | ICD-10-CM

## 2020-10-22 DIAGNOSIS — Z98.890 STATUS POST HIP SURGERY: ICD-10-CM

## 2020-10-22 PROCEDURE — 73502 X-RAY EXAM HIP UNI 2-3 VIEWS: CPT | Mod: RT | Performed by: RADIOLOGY

## 2020-10-22 PROCEDURE — 99024 POSTOP FOLLOW-UP VISIT: CPT | Performed by: ORTHOPAEDIC SURGERY

## 2020-10-22 NOTE — NURSING NOTE
Chief Complaint   Patient presents with     Surgical Followup     4wk POP F/u w/XR Right total hip arthroplasty - Right DOS: 09/22/2020     RECHECK     Pt. reports that she came to the Walk In Clinic for Drainage from her Incision. Dr. Maria placed her on Augmentin and she has now finished the Antibiotic and no drainage is present.        64 year old  1955      [place] #[id number] - [address] AT SEC OF [facility]    Allergies   Allergen Reactions     Nkda [No Known Drug Allergies]        Current Outpatient Medications   Medication     acetaminophen (TYLENOL) 325 MG tablet     aspirin (ASA) 81 MG chewable tablet     aspirin (ASA) 81 MG EC tablet     colestipol (COLESTID) 1 g tablet     fluticasone (FLONASE) 50 MCG/ACT nasal spray     loratadine (CLARITIN) 10 MG tablet     omeprazole (PRILOSEC) 20 MG DR capsule     oxyCODONE (ROXICODONE) 5 MG tablet     polyethylene glycol (MIRALAX) 17 g packet     senna-docusate (SENOKOT-S/PERICOLACE) 8.6-50 MG tablet     venlafaxine (EFFEXOR) 37.5 MG tablet     VITAMIN MIXTURE PO     amoxicillin-clavulanate (AUGMENTIN) 875-125 MG tablet     No current facility-administered medications for this visit.

## 2020-10-22 NOTE — PROGRESS NOTES
[facility]  Orthopaedic Oncology Surgery, Joint Replacement Consultation  by Emmanuel Box M.D.     Mary Lou Connelly MRN# [id number]   Age: 62 year old YOB: 1955      Requesting physician: Referred Self  Kehr, Kristen M         Background history:  DX:   1. 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 DJD R hip, ? Post-radiation     TREATMENTS:  1. 22 yrs ago, chemotherapy, Tejinder Rodriguez MD, [place]  2. 22 years ago, radiation therapy to pelvis and left hip, Dr. Real, [facility]  3. 9/22/20, Right total hip arthroplasty with femoral head autograft reconstruction of acetabular protrusio, Osbaldo Box MD, [facility]      Postoperative hip replacement follow-up clinic visit    Mary Lou Connelly is doing well after last surgery listed above.    Preoperative hip pain is   mostly resolved    Analgesic medication: none    EXAM:  Incision healing, clean and dry.  Pain free hip motion.  Range of motion limits not tested given early post-op status.  Leg/ankle edema: some 1+  Femoral, peroneal and tibial nerve function: normal  Gait:Normal  Level pelvis when standing.    Intraoperative cultures: none     IMAGING:  Radiographs demonstrate the hip implant in satisfactory position without evidence of any complication.    IMPRESSION:  Excellent outcome, to date, after hip replacement.  No complications evident.     PLAN:    Continue range of motion exercises and stretching.    Hip abductor and quadriceps strengthening exercises.    Weight bearing status: as tolerated    Discontinue DVT prophylaxis meds (i.e., warfarin or ASA) unless required for another indication.    Refill of: none     Patient given instructions re: prophylactic antibiotic recommendations during dental work.    Next follow-up visit at 1 year after surgery or sooner as needed.       Emmanuel Box M.D.  [employer] [facility] Professor  Oncology and Adult Reconstructive Surgery  Dept Orthopaedic Surgery, [facility]  [phone]  office  [email or web address]

## 2020-11-06 ENCOUNTER — DOCUMENTATION ONLY (OUTPATIENT)
Dept: SLEEP MEDICINE | Facility: CLINIC | Age: 65
End: 2020-11-06

## 2020-12-12 ENCOUNTER — HEALTH MAINTENANCE LETTER (OUTPATIENT)
Age: 65
End: 2020-12-12

## 2021-04-11 ENCOUNTER — HEALTH MAINTENANCE LETTER (OUTPATIENT)
Age: 66
End: 2021-04-11

## 2021-06-11 NOTE — LETTER
10/22/2020         RE: Mary Lou Connelly  [address]  [place] [address]        Dear Colleague,    Thank you for referring your patient, Mary Lou Connelly, to the [facility] [place]. Please see a copy of my visit note below.         [facility]  Orthopaedic Oncology Surgery, Joint Replacement Consultation  by Emmanuel Box M.D.     Mary Lou Connelly MRN# [id number]   Age: 62 year old YOB: 1955      Requesting physician: Referred Self  Kehr, Kristen M         Background history:  DX:   1. 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 DJD R hip, ? Post-radiation     TREATMENTS:  1. 22 yrs ago, chemotherapy, Tejinder Rodriguez MD, [place]  2. 22 years ago, radiation therapy to pelvis and left hip, Dr. Real, [facility]  3. 9/22/20, Right total hip arthroplasty with femoral head autograft reconstruction of acetabular protrusio, Osbaldo Box MD, [facility]              Assessment and Plan:   Assessment:  64-year-old female with PMH of squamous cell cancer anus, status post radiation and chemotherapy, now s/p R LACEY on 9/22/20. ***     Plan:  ***           History of Present Illness:   ***   62 year old female with history of squamous cell anal cancer status post radiation and chemotherapy approximately 22 years ago, now in remission( Radiation performed by Dr. Real at [facility]. who presented for evaluation for right total hip arthroplasty in 2018. Of note, she has suffered insufficiency fractures of her bilateral superior and inferior pubic rami as well as her coccyx. These fractures were all sustained during minor events such as lifting her grandchildren, or rising from a chair. She is diagnosed with osteopenia and currently on Fosamax as well as vitamin D supplementation.      At that time the plan was to continue with weight loss. The decision to move forward with treatment will be dependent upon her pain and loss in function rather than solely on radiographic  appearance and to follow up in a year or sooner if there are any changes.     In the interim patient has undergone a gastric sleeve bariatric surgery at [facility] in February of this year.  She has lost over 40 pounds.  In spite of this her pain of the right hip continues.  She is here to talk about possible right total hip arthroplasty.  She is experiencing night pain and initiation pain.  She ambulates with the help of a walker in and around the house.  Because of her recent bariatric surgery she no longer is able to use NSAIDs.  To mitigate the pain she uses Tylenol.  She has not received any recent intra-articular injections.  Patient lives together with her  and daughter and has to walk stairs.  She works as an .     During her postop bariatric surgery she had a brief episode of atrial fibrillation which spontaneously converted to sinus rhythm.  She still needs to follow-up with a cardiologist to further evaluate this.             Physical Exam:       EXAMINATION pertinent findings:   VITAL SIGNS: There were no vitals taken for this visit.  Body mass index is 36.06 kg/m .  PSYCH: Pleasant, healthy-appearing, alert, oriented x3, cooperative. Normal mood and affect.  RESP: non labored breathing   ABD: benign, soft, non-tender, no acute peritoneal signs.   SKIN: grossly normal   LYMPHATIC: grossly normal   NEURO: grossly normal   VASCULAR: satisfactory perfusion of all extremities   MUSCULOSKELETAL:   Cautious gait with shortened stance over the right lower extremity.  Flexion/extension 90 degrees with an extension deficit of 5 degrees.  Strongly decreased rotations.  Neurovascularly intact.             Data:   All laboratory data reviewed  All imaging studies reviewed by me     XR Pelvis with Right Hip 1 View:     Severe osteoarthritic changes of the right hip with protrusion.  Medial wall intact.  Status post bilateral ramus superior and inferior fractures.      Attending MD (Dr. Benitez  Isauro) Attestation :  This patient was seen and evaluated by me including a history, exam, and interpretation of all imaging and/or lab data.  Either a training physician (resident/fellow), who also saw the patient, or scribe has documented the visit in the attached note.    MD Georgina Robles Family Professor  Oncology and Adult Reconstructive Surgery  Dept Orthopaedic Surgery, [facility]  [phone] office, [phone] pager  [email or web address]        Total Time = 25 min, 50% of which was spent in counseling and coordination of care as documented above.        Verbal/written post procedure instructions were given to patient/caregiver./Instructed patient/caregiver to follow-up with primary care physician./Instructed patient/caregiver regarding signs and symptoms of infection./Keep the cast/splint/dressing clean and dry.

## 2021-09-26 ENCOUNTER — HEALTH MAINTENANCE LETTER (OUTPATIENT)
Age: 66
End: 2021-09-26

## 2021-11-23 ENCOUNTER — TELEPHONE (OUTPATIENT)
Dept: SLEEP MEDICINE | Facility: CLINIC | Age: 66
End: 2021-11-23
Payer: COMMERCIAL

## 2021-11-23 DIAGNOSIS — G47.33 OSA (OBSTRUCTIVE SLEEP APNEA): Primary | ICD-10-CM

## 2021-11-23 NOTE — TELEPHONE ENCOUNTER
Reason for call:  Other   Patient called regarding (reason for call): call back    Additional comments:   Patient needs supplies that she would like to get before the end of the year as she met her deductable.  Patient would like machine read.  Please call patient back to discuss.    Phone number to reach patient:  Cell number on file:    Telephone Information:   Mobile [phone]       Best Time:  any    Can we leave a detailed message on this number?  YES    Travel screening: Not Applicable

## 2021-11-24 NOTE — TELEPHONE ENCOUNTER
Message left for patient to contact sleep center to schedule a follow up appointment.     Keyana Frank MA

## 2021-12-07 ENCOUNTER — TELEPHONE (OUTPATIENT)
Dept: SLEEP MEDICINE | Facility: CLINIC | Age: 66
End: 2021-12-07
Payer: COMMERCIAL

## 2022-05-08 ENCOUNTER — HEALTH MAINTENANCE LETTER (OUTPATIENT)
Age: 67
End: 2022-05-08

## 2023-01-08 ENCOUNTER — HEALTH MAINTENANCE LETTER (OUTPATIENT)
Age: 68
End: 2023-01-08

## 2023-06-02 ENCOUNTER — HEALTH MAINTENANCE LETTER (OUTPATIENT)
Age: 68
End: 2023-06-02

## 2024-02-11 ENCOUNTER — HEALTH MAINTENANCE LETTER (OUTPATIENT)
Age: 69
End: 2024-02-11

## 2024-04-11 NOTE — LETTER
10/4/2018       RE: Mary Lou Connelly  [address]  [place] [address]     Dear Colleague,    Thank you for referring your patient, Mary Lou Connelly, to the [facility]. Please see a copy of my visit note below.        [facility]  Orthopaedic Oncology Surgery, Joint Replacement Consultation  by Emmanuel Box M.D.    Mary Lou Connelly MRN# [id number]   Age: 62 year old YOB: 1955     Requesting physician: Referred Self  Kehr, Kristen M       Background history:  DX:   1. 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 DJD R hip, ? Post-radiation    TREATMENTS:  1. 22 yrs ago, chemotherapy, [facility] Middleton MD  2. 22 years ago, radiation therapy to pelvis and left hip, Dr. Real, [facility]             Assessment and Plan:   Assessment:  Advanced right hip osteoarthritis with cystic change on the femoral as well as acetabular side     History of squamous cell cancer anus, status post radiation and chemotherapy     Intrapelvic mass, per report diagnosis hemangioma    Plan:  1. There is no need for further treatment currently  2. Continue with weight loss. Exercises based around arm movements may be helpful.  3. The decision to move forward with treatment will be dependent upon her pain and loss in function rather than solely on radiographic appearance  4. Follow up in a year or sooner if there are any changes            History of Present Illness:     62 year old female with history of squamous cell anal cancer status post radiation and chemotherapy approximately 22 years ago, now in remission( Radiation performed by Dr. Real at [facility]. Chemotherapy managed by Dr. Tejinder Rodriguez of [place]), who presents for evaluation for right total hip arthroplasty. Of note, she has suffered insufficiency fractures of her bilateral superior and inferior pubic rami as well as her coccyx. These fractures were all sustained  during minor events such as lifting her grandchildren, or rising from a chair. She is diagnosed with osteopenia and currently on Fosamax as well as vitamin D supplementation. Otherwise, The patient has a long-standing history of right hip pain for which she has been followed by Dr. Woody. She's never undergone any medical management in terms of corticosteroid injections or physical therapy. However was being followed with serial pelvic x-rays. Due to significant progression of her osteoarthritis, cystic changes, and concern for pelvic reconstruction, Dr. Woody did refer her to Dr. Box for right total hip arthroplasty.  Of note, she does have a history of what she describes as we a hematoma in her right pelvis which is displacing her bladder, this was further evaluated when she developed symptoms of urinary incontinence.  She states she underwent an image guided biopsy, which was sent for pathology and returned as a hematoma. She no longer has symptoms of urinary incontinence.    I last saw this patient on 3/1/18; please see this note for further details. At that time she was interesting in having a right total hip arthroplasty. We planned to have another appointment after the patient had time to attempt weight loss and completed imaging studies. I reviewed a CT of her pelvis taken on 3/9/18 and noted that the multiple bony insufficiency type fractures are likely the result of the radiation. I also reviewed an MRI of her pelvis no 3/9/18 and noted that no action is required based around this imaging. I reviewed labs taken on this same date and similarly noted that they did not necessitate any action.    Today, she states she can feel more discomfort in her right hip. She doesn't feel a large amount of pain due to this. She feels stiffness after long periods of inactivity, such as sleeping. She feels these symptoms have become worse. She can hear clicking or cracking sounds when she walks, which is something that  she is concerned about.     She reports that it is difficult for her to find a way to exercise due to her state of disability.           Physical Exam:     Exam deferred.           Data:   All laboratory data reviewed  All imaging studies reviewed by me    XR Pelvis with Right Hip 1 View:    Compared to before, bone formation may have increased.    Total Time = 15 min, 50% of which was spent in counseling and coordination of care as documented above.    Scribe Disclosure:   I, Errol Johnston, am serving as a scribe to document services personally performed by Emmanuel Box MD at this visit, based upon the provider's statements to me. All documentation has been reviewed by the aforementioned provider prior to being entered into the official medical record.      Again, thank you for allowing me to participate in the care of your patient.      Sincerely,    Emmanuel Box MD       74 y.o. M with PMHx of HLD, prediabetes, SLE on hydroxychloroquine presents with left foot pain over the past 4 days. Patient states that on Monday he was walking in his sandals on the beach and stepped on a stone. Patient states later that day he began to develop pain and fevers and chills. Patient states that this entire week his foot has become increasingly swollen and he has been unable to walk. Patient states he has had fevers and chills all week long, and took some tylenol with minimal relief. Patient went to his PCP who gave him pain meds and recommended he come to ED for further workup. Otherwise pt denies any chest pain, palpitations, shortness of breath, fever, chills, headache, visual changes, nausea, vomiting, diarrhea, dysuria, frequency.

## 2024-05-15 DIAGNOSIS — Z98.890 STATUS POST HIP SURGERY: Primary | ICD-10-CM

## 2024-05-15 DIAGNOSIS — M25.552 LEFT HIP PAIN: ICD-10-CM
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
## 2024-05-19 ASSESSMENT — HOOS JR
RISING FROM SITTING: MODERATE
HOOS JR TOTAL INTERVAL SCORE: 43.34
BENDING TO THE FLOOR TO PICK UP OBJECT: MODERATE
GOING UP OR DOWN STAIRS: MODERATE
WALKING ON UNEVEN SURFACE: SEVERE
SITTING: SEVERE
LYING IN BED (TURNING OVER, MAINTAINING HIP POSITION): SEVERE

## 2024-05-21 NOTE — TELEPHONE ENCOUNTER
Action May 21, 2024 10:19 AM MT   Action Taken Sent a request for imaging from [facility].       DIAGNOSIS: LEFT HIP   APPOINTMENT DATE: 05/23/2024   NOTES STATUS DETAILS   OFFICE NOTE from referring provider SELF    OFFICE NOTE from other specialist Internal LAST SEEN:  10/22/2020 - Emmanuel Box MD - [facility]   OPERATIVE REPORT Internal 09/22/2020 - RT LACEY   DEXA PACS [facility]:  08/08/023 - Hips/Spine   [facility] BONE SCAN PACS [facility]:  08/23/2016 - Whole Body   MRI PACS Internal    [facility]:  03/23/2017 - Pelvis    [facility]:  08/17/2016 - Sacrum  08/16/2016 - L Spine   CT SCAN PACS Internal    [facility]:  03/01/2019 - Abd/Pel     [facility] (IMAGES & REPORTS) PACS Internal    [facility]:  02/08/2018 - Hip/Pelvis

## 2024-05-23 ENCOUNTER — ANCILLARY PROCEDURE (OUTPATIENT)
Dept: GENERAL RADIOLOGY | Facility: CLINIC | Age: 69
End: 2024-05-23
Attending: ORTHOPAEDIC SURGERY
Payer: COMMERCIAL

## 2024-05-23 ENCOUNTER — PRE VISIT (OUTPATIENT)
Dept: ORTHOPEDICS | Facility: CLINIC | Age: 69
End: 2024-05-23

## 2024-05-23 ENCOUNTER — OFFICE VISIT (OUTPATIENT)
Dept: ORTHOPEDICS | Facility: CLINIC | Age: 69
End: 2024-05-23
Payer: COMMERCIAL

## 2024-05-23 VITALS — WEIGHT: 208 LBS | BODY MASS INDEX: 35.7 KG/M2

## 2024-05-23 DIAGNOSIS — J98.9 AIRWAY PROBLEM: ICD-10-CM

## 2024-05-23 DIAGNOSIS — M16.0 PRIMARY OSTEOARTHRITIS OF BOTH HIPS: ICD-10-CM

## 2024-05-23 DIAGNOSIS — G47.33 OBSTRUCTIVE SLEEP APNEA SYNDROME: ICD-10-CM

## 2024-05-23 DIAGNOSIS — Z98.890 STATUS POST HIP SURGERY: ICD-10-CM

## 2024-05-23 DIAGNOSIS — M25.552 LEFT HIP PAIN: ICD-10-CM

## 2024-05-23 DIAGNOSIS — Z98.890 STATUS POST HIP SURGERY: Primary | ICD-10-CM

## 2024-05-23 PROBLEM — E66.812 CLASS 2 SEVERE OBESITY DUE TO EXCESS CALORIES WITH SERIOUS COMORBIDITY IN ADULT (H): Status: ACTIVE | Noted: 2024-05-23

## 2024-05-23 PROBLEM — E66.01 CLASS 2 SEVERE OBESITY DUE TO EXCESS CALORIES WITH SERIOUS COMORBIDITY IN ADULT (H): Status: ACTIVE | Noted: 2024-05-23

## 2024-05-23 PROCEDURE — 73522 X-RAY EXAM HIPS BI 3-4 VIEWS: CPT | Performed by: RADIOLOGY

## 2024-05-23 PROCEDURE — 99204 OFFICE O/P NEW MOD 45 MIN: CPT | Performed by: ORTHOPAEDIC SURGERY

## 2024-05-23 RX ORDER — LOSARTAN POTASSIUM 50 MG/1
100 TABLET ORAL EVERY MORNING
COMMUNITY
Start: 2023-01-01

## 2024-05-23 RX ORDER — CETIRIZINE HYDROCHLORIDE 10 MG/1
TABLET ORAL
COMMUNITY
End: 2024-05-29

## 2024-05-23 NOTE — NURSING NOTE
Pre-Op Teaching was done in person at the clinic.    Teaching Flowsheet   Relevant Diagnosis: Pre-Op Teaching  Teaching Topic: L LACEY Pre-Op     Person(s) involved in teaching:   Patient     Motivation Level:  Asks Questions: Yes  Eager to Learn: Yes  Cooperative: Yes  Receptive (willing/able to accept information): Yes  Any cultural factors/Anabaptist beliefs that may influence understanding or compliance? No     Patient demonstrates understanding of the following:  Reason for the appointment, diagnosis and treatment plan: Yes  Knowledge of proper use of medications and conditions for which they are ordered (with special attention to potential side effects or drug interactions): Yes  Which situations necessitate calling provider and whom to contact: Yes- discussed the stoplight tool to help assist with this.      Teaching Concerns Addressed:      Proper use of surgical scrub explain: Yes    Nutritional needs and diet plan: Yes  Pain management techniques: Yes  Wound Care: Yes  How and/when to access community resources: Yes  Need for pre-op with in 30 days: Yes- asked that pre-op be done between 25-20 days before surgery is scheduled.   -I asked them to ensure they go over their daily medications during this visit and discuss what medications need to be stopped before surgery and when. If you are doing a pre-op with your PCP and they are not within the [employer], I ask them to fax it to our pre-op office. Patient verbalized understanding.      Instructional Materials Used/Given:  2 bottle of chlorhexidine, a surgery packet, and a joint booklet given to patient in clinic.      - Important contact info/ phone numbers: emphasizing clinic number and after hours number  - Map/ location of surgery  - Showering instructions  - Stop light tool  - Your Guide to Joint Replacement Booklet   - Antibiotic post op before every dentist appointment or procedure for the rest of their life.     Additionally the following was  discussed with patient:  - , Ki, will be driving the patient to surgery and able to pick the patient up after discharge and staying with them for 4-5 days after surgery.  - Need to schedule a dentist appointment and get done any recommended dental work prior to surgery.   - Online joint replacement call and the use of [employer] to send educational messages. Asked patient to sign up for join class during visit and patient declined to sign up at this time and stated will sign up later. Sheet with sign up instruction given to patient.   - Told patient to check in with insurance to check about coverage.      -Next step: Schedule pulmonary appointment to obtain clearance.       Schedule a surgery date 3-4 weeks after pulmonary appointment and schedule a Pre-Op appointment with PAC    Time spent with patient: 15 minutes.
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
## 2024-05-24 ENCOUNTER — OFFICE VISIT (OUTPATIENT)
Dept: PULMONOLOGY | Facility: CLINIC | Age: 69
End: 2024-05-24
Payer: COMMERCIAL

## 2024-05-24 ENCOUNTER — ANCILLARY PROCEDURE (OUTPATIENT)
Dept: GENERAL RADIOLOGY | Facility: CLINIC | Age: 69
End: 2024-05-24
Attending: STUDENT IN AN ORGANIZED HEALTH CARE EDUCATION/TRAINING PROGRAM
Payer: COMMERCIAL

## 2024-05-24 ENCOUNTER — TELEPHONE (OUTPATIENT)
Dept: PULMONOLOGY | Facility: CLINIC | Age: 69
End: 2024-05-24

## 2024-05-24 DIAGNOSIS — G47.30 SLEEP APNEA: ICD-10-CM

## 2024-05-24 DIAGNOSIS — J98.9 AIRWAY PROBLEM: ICD-10-CM

## 2024-05-24 DIAGNOSIS — J98.9 AIRWAY PROBLEM: Primary | ICD-10-CM

## 2024-05-24 PROCEDURE — 94726 PLETHYSMOGRAPHY LUNG VOLUMES: CPT | Performed by: INTERNAL MEDICINE

## 2024-05-24 PROCEDURE — 94729 DIFFUSING CAPACITY: CPT | Performed by: INTERNAL MEDICINE

## 2024-05-24 PROCEDURE — 71046 X-RAY EXAM CHEST 2 VIEWS: CPT | Mod: GC | Performed by: STUDENT IN AN ORGANIZED HEALTH CARE EDUCATION/TRAINING PROGRAM

## 2024-05-24 PROCEDURE — 94375 RESPIRATORY FLOW VOLUME LOOP: CPT | Performed by: INTERNAL MEDICINE

## 2024-05-24 NOTE — TELEPHONE ENCOUNTER
Patient confirmed scheduled appointment:  Date: 05/24/2024  Time: 3:00PM  Visit type: CXR  Provider: MISTY  Location: [facility]  Testing/imaging: N/A  Additional notes: N/A

## 2024-05-24 NOTE — PROGRESS NOTES
Mary Lou Connelly comes into clinic today at the request of Dr Lombardo , for PFT    Tolerated testing well. No adverse reactions. Left lab in no distress.        FANY SUN

## 2024-05-28 LAB
DLCOUNC-%PRED-PRE: 107 %
DLCOUNC-PRE: 20.65 ML/MIN/MMHG
DLCOUNC-PRED: 19.24 ML/MIN/MMHG
ERV-%PRED-PRE: 22 %
ERV-PRE: 0.23 L
ERV-PRED: 1.03 L
EXPTIME-PRE: 4.04 SEC
FEF2575-%PRED-PRE: 115 %
FEF2575-PRE: 2.2 L/SEC
FEF2575-PRED: 1.9 L/SEC
FEFMAX-%PRED-PRE: 75 %
FEFMAX-PRE: 4.42 L/SEC
FEFMAX-PRED: 5.89 L/SEC
FEV1-%PRED-PRE: 95 %
FEV1-PRE: 2.07 L
FEV1FEV6-PRE: 81 %
FEV1FEV6-PRED: 80 %
FEV1FVC-PRE: 81 %
FEV1FVC-PRED: 79 %
FEV1SVC-PRE: 83 %
FEV1SVC-PRED: 68 %
FIFMAX-PRE: 3.22 L/SEC
FRCPLETH-%PRED-PRE: 89 %
FRCPLETH-PRE: 2.41 L
FRCPLETH-PRED: 2.71 L
FVC-%PRED-PRE: 93 %
FVC-PRE: 2.54 L
FVC-PRED: 2.72 L
IC-%PRED-PRE: 110 %
IC-PRE: 2.27 L
IC-PRED: 2.05 L
RVPLETH-%PRED-PRE: 108 %
RVPLETH-PRE: 2.18 L
RVPLETH-PRED: 2.01 L
TLCPLETH-%PRED-PRE: 94 %
TLCPLETH-PRE: 4.68 L
TLCPLETH-PRED: 4.94 L
VA-%PRED-PRE: 90 %
VA-PRE: 4.17 L
VC-%PRED-PRE: 79 %
VC-PRE: 2.5 L
VC-PRED: 3.15 L

## 2024-05-29 ENCOUNTER — OFFICE VISIT (OUTPATIENT)
Dept: PULMONOLOGY | Facility: CLINIC | Age: 69
End: 2024-05-29
Payer: COMMERCIAL

## 2024-05-29 VITALS
OXYGEN SATURATION: 98 % | SYSTOLIC BLOOD PRESSURE: 149 MMHG | HEART RATE: 71 BPM | WEIGHT: 209.5 LBS | DIASTOLIC BLOOD PRESSURE: 81 MMHG | BODY MASS INDEX: 35.96 KG/M2

## 2024-05-29 DIAGNOSIS — J45.30 MILD PERSISTENT ASTHMA WITHOUT COMPLICATION: Primary | ICD-10-CM

## 2024-05-29 PROCEDURE — 99204 OFFICE O/P NEW MOD 45 MIN: CPT | Performed by: STUDENT IN AN ORGANIZED HEALTH CARE EDUCATION/TRAINING PROGRAM

## 2024-05-29 RX ORDER — BUDESONIDE AND FORMOTEROL FUMARATE DIHYDRATE 160; 4.5 UG/1; UG/1
2 AEROSOL RESPIRATORY (INHALATION) 2 TIMES DAILY
Qty: 10.2 G | Refills: 3 | Status: SHIPPED | OUTPATIENT
Start: 2024-05-29 | End: 2024-08-29

## 2024-05-29 RX ORDER — ALBUTEROL SULFATE 90 UG/1
2 AEROSOL, METERED RESPIRATORY (INHALATION) EVERY 6 HOURS PRN
Qty: 18 G | Refills: 3 | Status: SHIPPED | OUTPATIENT
Start: 2024-05-29

## 2024-05-29 RX ORDER — FEXOFENADINE HCL 180 MG/1
180 TABLET ORAL DAILY
Qty: 30 TABLET | Refills: 3 | Status: SHIPPED | OUTPATIENT
Start: 2024-05-29 | End: 2024-09-20

## 2024-05-29 RX ORDER — FLUTICASONE PROPIONATE 50 MCG
1 SPRAY, SUSPENSION (ML) NASAL DAILY
Qty: 11.1 ML | Refills: 1 | Status: SHIPPED | OUTPATIENT
Start: 2024-05-29 | End: 2024-09-20

## 2024-05-29 RX ORDER — GUAIFENESIN AND DEXTROMETHORPHAN HYDROBROMIDE 20; 400 MG/1; MG/1
TABLET ORAL PRN
COMMUNITY
End: 2024-10-03

## 2024-05-29 ASSESSMENT — PAIN SCALES - GENERAL: PAINLEVEL: MILD PAIN (3)

## 2024-05-29 NOTE — PATIENT INSTRUCTIONS
Thank you for coming to pulmonary clinic. Your pulmonary function tests are normal. Your chest imaging is normal. With the relationship to allergies this seems asthmatic in nature. I would like you to start Symbicort twice daily, rinse your mouth out after use. You may use albuterol for worsening shortness of breath or 5-10 minutes prior to activity. I would like you to try Allegra for allergies instead of Zyrtec. We will see you back in 3 months.
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
## 2024-06-05 ENCOUNTER — TELEPHONE (OUTPATIENT)
Dept: ORTHOPEDICS | Facility: CLINIC | Age: 69
End: 2024-06-05
Payer: COMMERCIAL

## 2024-06-05 NOTE — TELEPHONE ENCOUNTER
Phoned patient to schedule surgery with Dr. Box. Unable to leave a message, will try again at a later time.    Pavithra  Perioperative   [phone]

## 2024-06-07 NOTE — TELEPHONE ENCOUNTER
Second message left with direct number for patient to call back at their convenience.    Pavithra  Perioperative   [phone]

## 2024-06-07 NOTE — TELEPHONE ENCOUNTER
Received voicemail from patient calling back to schedule surgery with Dr. Box.     Returned call; went to voicemail; however, unable to provide voicemail with reason of call and call back number of [phone]. Will try again later.

## 2024-06-07 NOTE — TELEPHONE ENCOUNTER
Patient is scheduled for surgery with Dr. Box    Spoke with: Mary Lou    Date of Surgery: 8/20/24    [facility]    Informed patient they will need an adult  Yes    Pre op with Provider PAC, /22/24 at 10:15AM    Additional imaging/appointments: QIAN Gonzalez w/ [facility]    Surgery packet: Received     Additional comments: N/A        Leni Edwards on 6/7/2024 at 4:06 PM

## 2024-06-10 DIAGNOSIS — Z96.642 STATUS POST TOTAL REPLACEMENT OF LEFT HIP: Primary | ICD-10-CM

## 2024-06-10 NOTE — TELEPHONE ENCOUNTER
FUTURE VISIT INFORMATION      SURGERY INFORMATION:  Date: 24  [facility]  Surgeon:  Emmanuel Box MD   Anesthesia Type:  choice  Procedure: ARTHROPLASTY, HIP, TOTAL   Consult: ov 24    RECORDS REQUESTED FROM:       Primary Care Provider: Suzy Herring APRN, CNP  - [facility]    Pertinent Medical History: TANIA, hypertension    Most recent EKG+ Tracin20    Most recent ECHO: 20- [facility]    Most recent PFT's: 24

## 2024-07-22 ENCOUNTER — OFFICE VISIT (OUTPATIENT)
Dept: SURGERY | Facility: CLINIC | Age: 69
End: 2024-07-22
Payer: COMMERCIAL

## 2024-07-22 ENCOUNTER — PRE VISIT (OUTPATIENT)
Dept: SURGERY | Facility: CLINIC | Age: 69
End: 2024-07-22

## 2024-07-22 ENCOUNTER — ANESTHESIA EVENT (OUTPATIENT)
Dept: SURGERY | Facility: CLINIC | Age: 69
End: 2024-07-22
Payer: COMMERCIAL

## 2024-07-22 VITALS
HEIGHT: 64 IN | BODY MASS INDEX: 33.58 KG/M2 | TEMPERATURE: 98 F | SYSTOLIC BLOOD PRESSURE: 157 MMHG | WEIGHT: 196.7 LBS | RESPIRATION RATE: 16 BRPM | HEART RATE: 78 BPM | DIASTOLIC BLOOD PRESSURE: 79 MMHG | OXYGEN SATURATION: 96 %

## 2024-07-22 DIAGNOSIS — M16.12 OSTEOARTHRITIS OF LEFT HIP, UNSPECIFIED OSTEOARTHRITIS TYPE: ICD-10-CM

## 2024-07-22 DIAGNOSIS — Z01.818 PREOP EXAMINATION: Primary | ICD-10-CM

## 2024-07-22 PROCEDURE — 99204 OFFICE O/P NEW MOD 45 MIN: CPT | Performed by: PHYSICIAN ASSISTANT

## 2024-07-22 RX ORDER — ACETAMINOPHEN 500 MG
1000 TABLET ORAL 2 TIMES DAILY
COMMUNITY
End: 2024-10-03

## 2024-07-22 RX ORDER — ERGOCALCIFEROL 1.25 MG/1
50000 CAPSULE, LIQUID FILLED ORAL WEEKLY
COMMUNITY

## 2024-07-22 ASSESSMENT — PAIN SCALES - GENERAL: PAINLEVEL: NO PAIN (0)

## 2024-07-22 ASSESSMENT — LIFESTYLE VARIABLES: TOBACCO_USE: 1

## 2024-07-22 ASSESSMENT — ENCOUNTER SYMPTOMS: SEIZURES: 0

## 2024-07-22 NOTE — PATIENT INSTRUCTIONS
Preparing for Your Surgery      Name:  Mary Lou Connelly   MRN:  [id number]   :  1955   Today's Date:  2024       Arriving for surgery:  Surgery date:  24  Arrival time:  5.30AM    Please come to:     Please come to:       [facility] [address]   [address] [place]  [address]     The Green Ramp for patients and visitors is beneath the [facility]. The parking facility entrance is at the intersection of [address] [place] [address] and [address]. Patients and visitors who self-park will receive the reduced hospital parking rate (no ticket validation needed).     [employer] parking, located at the [facility] main entrance on [address] [place] [address], is available Monday - Friday from 7 am to 3:30 pm.     Discounted parking pass options can be purchased from  attendants during business hours.     -Check in at the security desk in the [facility] ([facility] [place])   Lobby. They will direct you to the correct elevators.   -Proceed to the 3rd floor, Adult Surgery Waiting Lounge. [phone]     If you need directions, a wheelchair or escort please stop at the Information Desk in the lobby.  Inform the information person you are here for surgery; a wheelchair and escort to [address] will be provided.   An escort to the Adult Surgery Waiting Lounge will be provided. .    What can I eat or drink?  -  You may eat and drink normally up to 8 hours prior to arrival time. (Until 9.30PM)  -  You may have clear liquids until 2 hours prior to arrival time. (Until 3.30AM)    Examples of clear liquids:  Water  Clear broth  Juices (apple, white grape, white cranberry  and cider) without pulp  Noncarbonated, powder based beverages  (lemonade and Jacob-Aid)  Sodas (Sprite, 7-Up, ginger ale and seltzer)  Coffee or tea (without milk or cream)  Gatorade    -  No Alcohol or cannabis products for at  least 24 hours before surgery.     Which medicines can I take?    Hold Aspirin for 7 days before surgery.   Hold Multivitamins for 7 days before surgery.  Hold Supplements for 7 days before surgery.  Hold Ibuprofen (Advil, Motrin) for 3 day(s) before surgery--unless otherwise directed by surgeon.  Hold Naproxen (Aleve) for 4 days before surgery.    -  DO NOT take these medications the day of surgery:  Colestipol (Colestid)  Losartan (Cozaar)  Vitamin mixture  Dextromethorpan-guaifenesin    -  PLEASE TAKE these medications the day of surgery:  All inhalers as needed and per scheduled  Allegra  Nasal spray as needed.  Effexor  Prilosec    How do I prepare myself?  - Please take 2 showers (one the night prior to surgery and one the morning of surgery) using Scrubcare or Hibiclens soap.    Use this soap only from the neck to your toes.     Leave the soap on your skin for one minute--then rinse thoroughly.      You may use your own shampoo and conditioner. No other hair products.   - Please remove all jewelry and body piercings.  - No lotions, deodorants or fragrance.  - No makeup or fingernail polish.   - Bring your ID and insurance card.    -If you use a CPAP machine, please bring the CPAP machine, tubing, and mask to hospital.    -If you have a Deep Brain Stimulator, Spinal Cord Stimulator, or any Neuro Stimulator device---you must bring the remote control to the hospital.      ALL PATIENTS GOING HOME THE SAME DAY OF SURGERY ARE REQUIRED TO HAVE A RESPONSIBLE ADULT TO DRIVE AND BE IN ATTENDANCE WITH THEM FOR 24 HOURS FOLLOWING SURGERY.    Covid testing policy as of 12/06/2022  Your surgeon will notify and schedule you for a COVID test if one is needed before surgery--please direct any questions or COVID symptoms to your surgeon      Questions or Concerns:    - For any questions regarding the day of surgery or your hospital stay, please contact the Pre Admission Nursing Office at [phone].       - If you have  health changes between today and your surgery, please call your surgeon.       - For questions after surgery, please call your surgeons office.           Current Visitor Guidelines    You may have 2 visitors in the pre op area.    Visiting hours: 8 a.m. to 8:30 p.m.    Patients confirmed or suspected to have symptoms of COVID 19 or flu:     No visitors allowed for adult patients.   Children (under age 18) can have 1 named visitor.     People who are sick or showing symptoms of COVID 19 or flu:    Are not allowed to visit patients--we can only make exceptions in special situations.       Please follow these guidelines for your visit:          Please maintain social distance          Masking is optional--however at times you may be asked to wear a mask for the safety of yourself and others     Clean your hands with alcohol hand . Do this when you arrive at and leave the building and patient room,    And again after you touch your mask or anything in the room.     Go directly to and from the room you are visiting.     Stay in the patient s room during your visit. Limit going to other places in the hospital as much as possible     Leave bags and jackets at home or in the car.     For everyone s health, please don t come and go during your visit. That includes for smoking   during your visit.           OPTIMAL RECOVERY AFTER SURGERY        Begin hydrating yourself by drinking at least 8-10 glasses of clear liquids for 24 hours before surgery:      Suggested clear liquids:   Water    Clear Juices   Clear Broth   Non- carbonated beverages    (Crystal Light or Jacob Aid)   Sodas    (Sprite, 7 up, ginger ale, seltzer)   Gatorade              Drink clear liquids up until 4 hours before your surgery.       We would like you to purchase a drink such as Gatorade or Ensure Clear (not the milkshake type).  Drink this before bedtime and the morning of surgery drink between 8-10 ounces or until you feel hydrated.         Keeping well hydrated leads to your veins being plump, you wake up faster, and you are less likely to be nauseated. Start drinking water as soon as you can after surgery and advance to clear liquids and food as tolerated.  IV fluids contain salt, drinking fluids will minimize the amount of IV fluids you need and decrease the amount of salt you get.                 The most common reason for the patient to be readmitted is dehydration. Staying hydrated after you go home from the hospital is very important.  Ensure or Ensure Clear are good options to keep you hydrated.
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
## 2024-07-31 ENCOUNTER — PREP FOR PROCEDURE (OUTPATIENT)
Dept: OTHER | Facility: CLINIC | Age: 69
End: 2024-07-31
Payer: COMMERCIAL

## 2024-08-12 NOTE — PROGRESS NOTES
Ortho Navigator Note      Pre-op Date 7/22/24     Medical Clearance  Cleared     Labs Stable      COVID Test Date No longer indicated      Skin  Intact      Activity: Ambulates independently without assistive device      Equipment Need Patient will likely need a walker for discharge. Defer to PT/OT for recs.       Meds to Hold Held all supplements 14 days prior to surgery  Hold Aspirin for 7 days before surgery.   Hold Multivitamins for 7 days before surgery.  Hold Supplements for 7 days before surgery.  Hold Ibuprofen (Advil, Motrin) for 3 day(s) before surgery--unless otherwise directed by surgeon.  Hold Naproxen (Aleve) for 4 days before surgery.     -  DO NOT take these medications the day of surgery:  Colestipol (Colestid)  Losartan (Cozaar)  Vitamin mixture  Dextromethorpan-guaifenesin     -  PLEASE TAKE these medications the day of surgery:  All inhalers as needed and per scheduled  Allegra  Nasal spray as needed.  Effexor  Prilosec  * Medication recommendations are not intended to be exhaustive; they are limited to common medications that are potentially dangerous if incorrectly managed   NPO Instructions  Instructed to stop solids 8 hr prior to arrival and clears 2 hr prior to arrival.       Pre-op Joint Education Complete? Complete   Discharge Plan Patient has plan to discharge home on morning of POD 1.    Anthony will arrive at hospital at 0800 to participate in therapy and discharge education. They will then transport patient home    /Transportation Anthony will be  and transportation.  is physically able to care for patient.      Barriers to Discharge No barriers to discharge.      Additional Info/   Special Needs : Patient had no unanswered questions or concerns.           08/12/24 5781   Discharge Planning   Patient/Family Anticipates Transition to home with family   Concerns to be Addressed no discharge needs identified   Living Arrangements   People in Home spouse;child(virgil),  adult;grandchild(virgil)   Type of Residence Private Residence   Is your private residence a single family home or apartment? Single family home   Number of Stairs, Within Home, Primary greater than 10 stairs  (Split level)   Stair Railings, Within Home, Primary railings safe and in good condition   Once home, are you able to live on one level? No   Bathroom Shower/Tub Tub/Shower unit   Equipment Currently Used at Home none   Support System   Support Systems Spouse/Significant Other;Children;Family Members   Do you have someone available to stay with you one or two nights once you are home? Yes   Medical Clearance   Date of Physical 07/22/24   Clinic Name [facility]   It is recommended that you call and check with any specialty providers before surgery to see if you need surgical clearance.  Do you see any specialty providers outside of your primary care provider? No   Blood   Known Bleeding Disorder or Coagulopathy No   Does the patient have any Taoist/cultural preferences related to blood products? No   Education   Has the patient scheduled or completed pre-op total joint education, either in class or online, in the last 12 months? No   Patient attended total joint pre-op class/received pre-op teaching  online   Relationship/Living Environment   Name(s) of People in Home Anthony (spouse)

## 2024-08-15 ENCOUNTER — PREP FOR PROCEDURE (OUTPATIENT)
Dept: OTHER | Facility: CLINIC | Age: 69
End: 2024-08-15
Payer: COMMERCIAL

## 2024-08-15 NOTE — PROGRESS NOTES
SURGERY PLAN (PRE-OP PLAN)    Patient Position (indicated by x):    Supine     Supine with torso rolled up on a bump     Floppy lateral on torso length bean bag     Lateral decubitus, bean bag, full length   x  Lateral decubitus, Wixson hip positioner     Safety paddle side supports x 2 clamped to side rail     Lithotomy, both legs in yellow padded leg santiago     Lithotomy, single leg in yellow padded leg santiago     Prone on blanket rolls/round gel pad     Prone on Usman (arched) frame on Gerardo table     Single thigh in orange arthroscopy clamp     Beach chair semi recumbent     Spider limb positioner     Arm out on radiolucent arm table     Split drape with top bar   x  Revision LACEY drape with plastic side bags for leg     Extremity drape     Shoulder pack drape     Laparotomy drape     Viveros catheter          General Equipment Requests (indicated by x):      C-Arm with C-Armor drape     C-Arm (video capable, Axentra00 model)     O-Arm with Stealth imaging     Fracture Table     Gerardo XR Table     SurgiGraphic 6000 (diving board) fluoro table     Cell saver     Isauro Biopsy trephine set w/ K-wire & pituitary rongeurs     Small pituitary rongeur   x  Isauro's angled curettes, narrow shaft     Bone graft, kapner gouges     Midas Surya Medtronic brittney, electric motor     Phenol 5%     [place] BMAC stem cell     Vancomycin 1 gram powder     Zometa 4 mg vials     Depo Medrol steroid     Blunt Pelvic Retractor (.55, Blunt Hohmann with  slight bend)     (1) Portable hand held radiation detector machine for sentinel node biopsy and (2) Lymphazurin     [place]otte Osteotomes         LACEY Requests (indicated by x):    Biomet ECHO Bimetric ingrowth stem     Biomet Integral cemented stem     Biomet RB cup, 28+32 heads     Biomet Bipolar cup     Biomet Constrained Freedom liner with heads     Giles Nephew BHR resurfacing LACEY with ImmunGene navigation unit (need 2 weeks advance notice)     DePuy S-ROM long stems     Ash  Restoration modular long stem     Biomet RUFUS long stems, both interlocked and splined stems     Biomet Regenerex TM cup + augments   x  Ash Tritanium TM cup +  augments   x  Madison Accolade stem   x  Ash Omnifit cemented stem     Biomet OSS Compress fixation     IM flexible reamers + guidewire, < 9 mm     IM flexible reamers + guidewire, > 9 mm     Allograft: femoral head     Allograft: distal femur     Allograft: proximal tibia   x  Madison Simplex PMMA (available, not open)     Allograft cancellous chips     Allograft cancellous crushed     Scott Emay Softcom Troch Claw + cable, insertion instruments     Scott Emay Softcom beaded cerclage cable, green cable  x2 , insertion instruments     Yoselin CTR Troch cable plate     AO pelvic instruments and clamps (angled) Blunt Hohmann & angled Fritz, large bone reduction forceps             Plan:    Plan for Left LACEY utilizing posterolateral approach. We plan to use a press-fit femoral stem, but may need to cement the femoral stem.     Berto Whatley  Adult Joint Reconstruction Fellow  Dept Orthopaedic Surgery, [facility]

## 2024-08-20 ENCOUNTER — APPOINTMENT (OUTPATIENT)
Dept: GENERAL RADIOLOGY | Facility: CLINIC | Age: 69
End: 2024-08-20
Attending: ORTHOPAEDIC SURGERY
Payer: COMMERCIAL

## 2024-08-20 ENCOUNTER — ANESTHESIA (OUTPATIENT)
Dept: SURGERY | Facility: CLINIC | Age: 69
End: 2024-08-20
Payer: COMMERCIAL

## 2024-08-20 ENCOUNTER — APPOINTMENT (OUTPATIENT)
Dept: PHYSICAL THERAPY | Facility: CLINIC | Age: 69
End: 2024-08-20
Attending: ORTHOPAEDIC SURGERY
Payer: COMMERCIAL

## 2024-08-20 ENCOUNTER — HOSPITAL ENCOUNTER (OUTPATIENT)
Facility: CLINIC | Age: 69
Discharge: HOME | End: 2024-08-21
Attending: ORTHOPAEDIC SURGERY | Admitting: ORTHOPAEDIC SURGERY
Payer: COMMERCIAL

## 2024-08-20 DIAGNOSIS — Z98.890 STATUS POST HIP SURGERY: Primary | ICD-10-CM

## 2024-08-20 PROBLEM — M16.12 ARTHRITIS OF LEFT HIP: Status: ACTIVE | Noted: 2024-08-20

## 2024-08-20 LAB
ABO/RH(D): NORMAL
ANTIBODY SCREEN: NEGATIVE
BLD PROD TYP BPU: NORMAL
BLOOD COMPONENT TYPE: NORMAL
CODING SYSTEM: NORMAL
CROSSMATCH: NORMAL
GLUCOSE BLDC GLUCOMTR-MCNC: 98 MG/DL (ref 70–99)
SPECIMEN EXPIRATION DATE: NORMAL
UNIT ABO/RH: NORMAL
UNIT NUMBER: NORMAL
UNIT STATUS: NORMAL
UNIT TYPE ISBT: 600

## 2024-08-20 PROCEDURE — 710N000010 HC RECOVERY PHASE 1, LEVEL 2, PER MIN: Performed by: ORTHOPAEDIC SURGERY

## 2024-08-20 PROCEDURE — 250N000011 HC RX IP 250 OP 636: Performed by: PHYSICIAN ASSISTANT

## 2024-08-20 PROCEDURE — 258N000003 HC RX IP 258 OP 636: Performed by: NURSE ANESTHETIST, CERTIFIED REGISTERED

## 2024-08-20 PROCEDURE — 250N000013 HC RX MED GY IP 250 OP 250 PS 637: Performed by: PHYSICIAN ASSISTANT

## 2024-08-20 PROCEDURE — 82962 GLUCOSE BLOOD TEST: CPT

## 2024-08-20 PROCEDURE — 250N000011 HC RX IP 250 OP 636

## 2024-08-20 PROCEDURE — 250N000013 HC RX MED GY IP 250 OP 250 PS 637: Performed by: STUDENT IN AN ORGANIZED HEALTH CARE EDUCATION/TRAINING PROGRAM

## 2024-08-20 PROCEDURE — 250N000009 HC RX 250: Performed by: ORTHOPAEDIC SURGERY

## 2024-08-20 PROCEDURE — 272N000002 HC OR SUPPLY OTHER OPNP: Performed by: ORTHOPAEDIC SURGERY

## 2024-08-20 PROCEDURE — 258N000003 HC RX IP 258 OP 636: Performed by: PHYSICIAN ASSISTANT

## 2024-08-20 PROCEDURE — 36415 COLL VENOUS BLD VENIPUNCTURE: CPT | Performed by: STUDENT IN AN ORGANIZED HEALTH CARE EDUCATION/TRAINING PROGRAM

## 2024-08-20 PROCEDURE — 370N000017 HC ANESTHESIA TECHNICAL FEE, PER MIN: Performed by: ORTHOPAEDIC SURGERY

## 2024-08-20 PROCEDURE — 86923 COMPATIBILITY TEST ELECTRIC: CPT | Performed by: STUDENT IN AN ORGANIZED HEALTH CARE EDUCATION/TRAINING PROGRAM

## 2024-08-20 PROCEDURE — 272N000001 HC OR GENERAL SUPPLY STERILE: Performed by: ORTHOPAEDIC SURGERY

## 2024-08-20 PROCEDURE — 99215 OFFICE O/P EST HI 40 MIN: CPT | Performed by: PHYSICIAN ASSISTANT

## 2024-08-20 PROCEDURE — 27130 TOTAL HIP ARTHROPLASTY: CPT | Performed by: STUDENT IN AN ORGANIZED HEALTH CARE EDUCATION/TRAINING PROGRAM

## 2024-08-20 PROCEDURE — 250N000011 HC RX IP 250 OP 636: Performed by: NURSE ANESTHETIST, CERTIFIED REGISTERED

## 2024-08-20 PROCEDURE — 999N000065 XR PELVIS AND HIP PORTABLE LEFT 1 VIEW

## 2024-08-20 PROCEDURE — 27130 TOTAL HIP ARTHROPLASTY: CPT | Performed by: NURSE ANESTHETIST, CERTIFIED REGISTERED

## 2024-08-20 PROCEDURE — C1776 JOINT DEVICE (IMPLANTABLE): HCPCS | Performed by: ORTHOPAEDIC SURGERY

## 2024-08-20 PROCEDURE — 86900 BLOOD TYPING SEROLOGIC ABO: CPT | Performed by: STUDENT IN AN ORGANIZED HEALTH CARE EDUCATION/TRAINING PROGRAM

## 2024-08-20 PROCEDURE — 97110 THERAPEUTIC EXERCISES: CPT | Mod: GP

## 2024-08-20 PROCEDURE — 999N000141 HC STATISTIC PRE-PROCEDURE NURSING ASSESSMENT: Performed by: ORTHOPAEDIC SURGERY

## 2024-08-20 PROCEDURE — 97161 PT EVAL LOW COMPLEX 20 MIN: CPT | Mod: GP

## 2024-08-20 PROCEDURE — 360N000077 HC SURGERY LEVEL 4, PER MIN: Performed by: ORTHOPAEDIC SURGERY

## 2024-08-20 PROCEDURE — 258N000003 HC RX IP 258 OP 636: Performed by: STUDENT IN AN ORGANIZED HEALTH CARE EDUCATION/TRAINING PROGRAM

## 2024-08-20 PROCEDURE — 250N000025 HC SEVOFLURANE, PER MIN: Performed by: ORTHOPAEDIC SURGERY

## 2024-08-20 PROCEDURE — 99417 PROLNG OP E/M EACH 15 MIN: CPT | Performed by: PHYSICIAN ASSISTANT

## 2024-08-20 PROCEDURE — 250N000009 HC RX 250: Performed by: NURSE ANESTHETIST, CERTIFIED REGISTERED

## 2024-08-20 DEVICE — 10 DEGREE POLYETHYLENE INSERT
Type: IMPLANTABLE DEVICE | Site: HIP | Status: FUNCTIONAL
Brand: TRIDENT

## 2024-08-20 DEVICE — TRIDENT II TRITANIUM CLUSTER 58F
Type: IMPLANTABLE DEVICE | Site: HIP | Status: FUNCTIONAL
Brand: TRIDENT II

## 2024-08-20 DEVICE — 6.5MM LOW PROFILE HEX SCREW 40MM
Type: IMPLANTABLE DEVICE | Site: HIP | Status: FUNCTIONAL
Brand: TRIDENT II

## 2024-08-20 DEVICE — 6.5MM LOW PROFILE HEX SCREW 50MM
Type: IMPLANTABLE DEVICE | Site: HIP | Status: FUNCTIONAL
Brand: TRIDENT II

## 2024-08-20 DEVICE — CERAMIC V40 FEMORAL HEAD
Type: IMPLANTABLE DEVICE | Site: HIP | Status: FUNCTIONAL
Brand: BIOLOX

## 2024-08-20 DEVICE — 132 DEGREE NECK ANGLE HIP STEM
Type: IMPLANTABLE DEVICE | Site: HIP | Status: FUNCTIONAL
Brand: ACCOLADE

## 2024-08-20 RX ORDER — ACETAMINOPHEN 325 MG/1
650 TABLET ORAL EVERY 4 HOURS PRN
Status: DISCONTINUED | OUTPATIENT
Start: 2024-08-23 | End: 2024-08-21 | Stop reason: HOSPADM

## 2024-08-20 RX ORDER — HYDROMORPHONE HYDROCHLORIDE 1 MG/ML
0.4 INJECTION, SOLUTION INTRAMUSCULAR; INTRAVENOUS; SUBCUTANEOUS EVERY 5 MIN PRN
Status: DISCONTINUED | OUTPATIENT
Start: 2024-08-20 | End: 2024-08-20 | Stop reason: HOSPADM

## 2024-08-20 RX ORDER — ONDANSETRON 2 MG/ML
INJECTION INTRAMUSCULAR; INTRAVENOUS PRN
Status: DISCONTINUED | OUTPATIENT
Start: 2024-08-20 | End: 2024-08-20

## 2024-08-20 RX ORDER — BISACODYL 10 MG
10 SUPPOSITORY, RECTAL RECTAL DAILY PRN
Status: DISCONTINUED | OUTPATIENT
Start: 2024-08-23 | End: 2024-08-21 | Stop reason: HOSPADM

## 2024-08-20 RX ORDER — MONTELUKAST SODIUM 4 MG/1
2 TABLET, CHEWABLE ORAL 2 TIMES DAILY
Status: DISCONTINUED | OUTPATIENT
Start: 2024-08-20 | End: 2024-08-21 | Stop reason: HOSPADM

## 2024-08-20 RX ORDER — FLUTICASONE PROPIONATE 50 MCG
1 SPRAY, SUSPENSION (ML) NASAL DAILY
Status: DISCONTINUED | OUTPATIENT
Start: 2024-08-21 | End: 2024-08-21 | Stop reason: HOSPADM

## 2024-08-20 RX ORDER — AMOXICILLIN 250 MG
1-2 CAPSULE ORAL 2 TIMES DAILY
Qty: 30 TABLET | Refills: 0 | Status: SHIPPED | OUTPATIENT
Start: 2024-08-20 | End: 2024-08-21

## 2024-08-20 RX ORDER — POLYETHYLENE GLYCOL 3350 17 G/17G
17 POWDER, FOR SOLUTION ORAL DAILY
Status: DISCONTINUED | OUTPATIENT
Start: 2024-08-21 | End: 2024-08-21 | Stop reason: HOSPADM

## 2024-08-20 RX ORDER — GABAPENTIN 100 MG/1
100 CAPSULE ORAL AT BEDTIME
Status: DISCONTINUED | OUTPATIENT
Start: 2024-08-20 | End: 2024-08-21 | Stop reason: HOSPADM

## 2024-08-20 RX ORDER — SODIUM CHLORIDE, SODIUM LACTATE, POTASSIUM CHLORIDE, CALCIUM CHLORIDE 600; 310; 30; 20 MG/100ML; MG/100ML; MG/100ML; MG/100ML
INJECTION, SOLUTION INTRAVENOUS CONTINUOUS
Status: DISCONTINUED | OUTPATIENT
Start: 2024-08-20 | End: 2024-08-20 | Stop reason: HOSPADM

## 2024-08-20 RX ORDER — PROCHLORPERAZINE MALEATE 5 MG
5 TABLET ORAL EVERY 6 HOURS PRN
Status: DISCONTINUED | OUTPATIENT
Start: 2024-08-20 | End: 2024-08-21 | Stop reason: HOSPADM

## 2024-08-20 RX ORDER — ESMOLOL HYDROCHLORIDE 10 MG/ML
INJECTION INTRAVENOUS PRN
Status: DISCONTINUED | OUTPATIENT
Start: 2024-08-20 | End: 2024-08-20

## 2024-08-20 RX ORDER — HYDROMORPHONE HYDROCHLORIDE 1 MG/ML
0.4 INJECTION, SOLUTION INTRAMUSCULAR; INTRAVENOUS; SUBCUTANEOUS
Status: DISCONTINUED | OUTPATIENT
Start: 2024-08-20 | End: 2024-08-21 | Stop reason: HOSPADM

## 2024-08-20 RX ORDER — ONDANSETRON 2 MG/ML
4 INJECTION INTRAMUSCULAR; INTRAVENOUS EVERY 6 HOURS PRN
Status: DISCONTINUED | OUTPATIENT
Start: 2024-08-20 | End: 2024-08-21 | Stop reason: HOSPADM

## 2024-08-20 RX ORDER — ONDANSETRON 2 MG/ML
4 INJECTION INTRAMUSCULAR; INTRAVENOUS EVERY 30 MIN PRN
Status: DISCONTINUED | OUTPATIENT
Start: 2024-08-20 | End: 2024-08-20 | Stop reason: HOSPADM

## 2024-08-20 RX ORDER — TRANEXAMIC ACID 650 MG/1
1950 TABLET ORAL ONCE
Status: COMPLETED | OUTPATIENT
Start: 2024-08-20 | End: 2024-08-20

## 2024-08-20 RX ORDER — ACETAMINOPHEN 325 MG/1
975 TABLET ORAL ONCE
Status: DISCONTINUED | OUTPATIENT
Start: 2024-08-20 | End: 2024-08-20 | Stop reason: HOSPADM

## 2024-08-20 RX ORDER — NALOXONE HYDROCHLORIDE 0.4 MG/ML
0.1 INJECTION, SOLUTION INTRAMUSCULAR; INTRAVENOUS; SUBCUTANEOUS
Status: DISCONTINUED | OUTPATIENT
Start: 2024-08-20 | End: 2024-08-20 | Stop reason: HOSPADM

## 2024-08-20 RX ORDER — LIDOCAINE 40 MG/G
CREAM TOPICAL
Status: DISCONTINUED | OUTPATIENT
Start: 2024-08-20 | End: 2024-08-20 | Stop reason: HOSPADM

## 2024-08-20 RX ORDER — SODIUM CHLORIDE, SODIUM LACTATE, POTASSIUM CHLORIDE, CALCIUM CHLORIDE 600; 310; 30; 20 MG/100ML; MG/100ML; MG/100ML; MG/100ML
INJECTION, SOLUTION INTRAVENOUS CONTINUOUS PRN
Status: DISCONTINUED | OUTPATIENT
Start: 2024-08-20 | End: 2024-08-20

## 2024-08-20 RX ORDER — DEXAMETHASONE SODIUM PHOSPHATE 4 MG/ML
4 INJECTION, SOLUTION INTRA-ARTICULAR; INTRALESIONAL; INTRAMUSCULAR; INTRAVENOUS; SOFT TISSUE
Status: DISCONTINUED | OUTPATIENT
Start: 2024-08-20 | End: 2024-08-20 | Stop reason: HOSPADM

## 2024-08-20 RX ORDER — DIPHENHYDRAMINE HCL 12.5MG/5ML
12.5 LIQUID (ML) ORAL EVERY 6 HOURS PRN
Status: DISCONTINUED | OUTPATIENT
Start: 2024-08-20 | End: 2024-08-20 | Stop reason: HOSPADM

## 2024-08-20 RX ORDER — ACETAMINOPHEN 325 MG/1
975 TABLET ORAL EVERY 8 HOURS
Status: DISCONTINUED | OUTPATIENT
Start: 2024-08-20 | End: 2024-08-21 | Stop reason: HOSPADM

## 2024-08-20 RX ORDER — KETAMINE HYDROCHLORIDE 10 MG/ML
INJECTION INTRAMUSCULAR; INTRAVENOUS PRN
Status: DISCONTINUED | OUTPATIENT
Start: 2024-08-20 | End: 2024-08-20

## 2024-08-20 RX ORDER — GABAPENTIN 100 MG/1
CAPSULE ORAL
Qty: 14 CAPSULE | Refills: 0 | Status: SHIPPED | OUTPATIENT
Start: 2024-08-20 | End: 2024-08-21

## 2024-08-20 RX ORDER — CEFAZOLIN SODIUM 2 G/100ML
2 INJECTION, SOLUTION INTRAVENOUS EVERY 8 HOURS
Status: COMPLETED | OUTPATIENT
Start: 2024-08-20 | End: 2024-08-21

## 2024-08-20 RX ORDER — ASPIRIN 81 MG/1
81 TABLET ORAL 2 TIMES DAILY
Qty: 60 TABLET | Refills: 0 | Status: SHIPPED | OUTPATIENT
Start: 2024-08-20 | End: 2024-08-21

## 2024-08-20 RX ORDER — NALOXONE HYDROCHLORIDE 0.4 MG/ML
0.4 INJECTION, SOLUTION INTRAMUSCULAR; INTRAVENOUS; SUBCUTANEOUS
Status: DISCONTINUED | OUTPATIENT
Start: 2024-08-20 | End: 2024-08-21 | Stop reason: HOSPADM

## 2024-08-20 RX ORDER — PROPOFOL 10 MG/ML
INJECTION, EMULSION INTRAVENOUS PRN
Status: DISCONTINUED | OUTPATIENT
Start: 2024-08-20 | End: 2024-08-20

## 2024-08-20 RX ORDER — ONDANSETRON 4 MG/1
4 TABLET, ORALLY DISINTEGRATING ORAL EVERY 30 MIN PRN
Status: DISCONTINUED | OUTPATIENT
Start: 2024-08-20 | End: 2024-08-20 | Stop reason: HOSPADM

## 2024-08-20 RX ORDER — LOSARTAN POTASSIUM 50 MG/1
50 TABLET ORAL EVERY MORNING
Status: DISCONTINUED | OUTPATIENT
Start: 2024-08-21 | End: 2024-08-20

## 2024-08-20 RX ORDER — VENLAFAXINE 37.5 MG/1
37.5 TABLET ORAL 2 TIMES DAILY
Status: DISCONTINUED | OUTPATIENT
Start: 2024-08-20 | End: 2024-08-21 | Stop reason: HOSPADM

## 2024-08-20 RX ORDER — LIDOCAINE 40 MG/G
CREAM TOPICAL
Status: DISCONTINUED | OUTPATIENT
Start: 2024-08-20 | End: 2024-08-21 | Stop reason: HOSPADM

## 2024-08-20 RX ORDER — DEXAMETHASONE SODIUM PHOSPHATE 4 MG/ML
INJECTION, SOLUTION INTRA-ARTICULAR; INTRALESIONAL; INTRAMUSCULAR; INTRAVENOUS; SOFT TISSUE PRN
Status: DISCONTINUED | OUTPATIENT
Start: 2024-08-20 | End: 2024-08-20

## 2024-08-20 RX ORDER — ACETAMINOPHEN 325 MG/1
975 TABLET ORAL ONCE
Status: DISCONTINUED | OUTPATIENT
Start: 2024-08-20 | End: 2024-08-20

## 2024-08-20 RX ORDER — NALOXONE HYDROCHLORIDE 0.4 MG/ML
0.2 INJECTION, SOLUTION INTRAMUSCULAR; INTRAVENOUS; SUBCUTANEOUS
Status: DISCONTINUED | OUTPATIENT
Start: 2024-08-20 | End: 2024-08-21 | Stop reason: HOSPADM

## 2024-08-20 RX ORDER — FENTANYL CITRATE 50 UG/ML
50 INJECTION, SOLUTION INTRAMUSCULAR; INTRAVENOUS EVERY 5 MIN PRN
Status: DISCONTINUED | OUTPATIENT
Start: 2024-08-20 | End: 2024-08-20 | Stop reason: HOSPADM

## 2024-08-20 RX ORDER — LOSARTAN POTASSIUM 100 MG/1
100 TABLET ORAL EVERY MORNING
Status: DISCONTINUED | OUTPATIENT
Start: 2024-08-21 | End: 2024-08-21 | Stop reason: HOSPADM

## 2024-08-20 RX ORDER — OXYCODONE HYDROCHLORIDE 5 MG/1
5 TABLET ORAL EVERY 4 HOURS PRN
Status: DISCONTINUED | OUTPATIENT
Start: 2024-08-20 | End: 2024-08-21 | Stop reason: HOSPADM

## 2024-08-20 RX ORDER — HYDRALAZINE HYDROCHLORIDE 20 MG/ML
2.5-5 INJECTION INTRAMUSCULAR; INTRAVENOUS EVERY 10 MIN PRN
Status: DISCONTINUED | OUTPATIENT
Start: 2024-08-20 | End: 2024-08-20 | Stop reason: HOSPADM

## 2024-08-20 RX ORDER — ACETAMINOPHEN 325 MG/1
650 TABLET ORAL EVERY 4 HOURS PRN
Qty: 100 TABLET | Refills: 0 | Status: SHIPPED | OUTPATIENT
Start: 2024-08-20 | End: 2024-08-21

## 2024-08-20 RX ORDER — CEFAZOLIN SODIUM/WATER 2 G/20 ML
2 SYRINGE (ML) INTRAVENOUS SEE ADMIN INSTRUCTIONS
Status: DISCONTINUED | OUTPATIENT
Start: 2024-08-20 | End: 2024-08-20 | Stop reason: HOSPADM

## 2024-08-20 RX ORDER — CELECOXIB 200 MG/1
200 CAPSULE ORAL DAILY
Qty: 14 CAPSULE | Refills: 0 | Status: SHIPPED | OUTPATIENT
Start: 2024-08-20 | End: 2024-08-21

## 2024-08-20 RX ORDER — ALBUTEROL SULFATE 90 UG/1
2 AEROSOL, METERED RESPIRATORY (INHALATION) EVERY 6 HOURS PRN
Status: DISCONTINUED | OUTPATIENT
Start: 2024-08-20 | End: 2024-08-21 | Stop reason: HOSPADM

## 2024-08-20 RX ORDER — ASPIRIN 81 MG/1
81 TABLET ORAL 2 TIMES DAILY
Status: DISCONTINUED | OUTPATIENT
Start: 2024-08-20 | End: 2024-08-21 | Stop reason: HOSPADM

## 2024-08-20 RX ORDER — CITRIC ACID/SODIUM CITRATE 334-500MG
30 SOLUTION, ORAL ORAL ONCE
Status: COMPLETED | OUTPATIENT
Start: 2024-08-20 | End: 2024-08-20

## 2024-08-20 RX ORDER — CELECOXIB 200 MG/1
400 CAPSULE ORAL ONCE
Status: COMPLETED | OUTPATIENT
Start: 2024-08-20 | End: 2024-08-20

## 2024-08-20 RX ORDER — LIDOCAINE HYDROCHLORIDE 20 MG/ML
INJECTION, SOLUTION INFILTRATION; PERINEURAL PRN
Status: DISCONTINUED | OUTPATIENT
Start: 2024-08-20 | End: 2024-08-20

## 2024-08-20 RX ORDER — SODIUM CHLORIDE 9 MG/ML
INJECTION, SOLUTION INTRAVENOUS CONTINUOUS
Status: DISCONTINUED | OUTPATIENT
Start: 2024-08-20 | End: 2024-08-21 | Stop reason: HOSPADM

## 2024-08-20 RX ORDER — CEFAZOLIN SODIUM IN 0.9 % NACL 3 G/100 ML
3 INTRAVENOUS SOLUTION, PIGGYBACK (ML) INTRAVENOUS EVERY 8 HOURS
Status: DISCONTINUED | OUTPATIENT
Start: 2024-08-20 | End: 2024-08-20

## 2024-08-20 RX ORDER — FENTANYL CITRATE 50 UG/ML
25 INJECTION, SOLUTION INTRAMUSCULAR; INTRAVENOUS EVERY 5 MIN PRN
Status: DISCONTINUED | OUTPATIENT
Start: 2024-08-20 | End: 2024-08-20 | Stop reason: HOSPADM

## 2024-08-20 RX ORDER — GABAPENTIN 100 MG/1
100 CAPSULE ORAL
Status: COMPLETED | OUTPATIENT
Start: 2024-08-20 | End: 2024-08-20

## 2024-08-20 RX ORDER — ONDANSETRON 4 MG/1
4 TABLET, ORALLY DISINTEGRATING ORAL EVERY 6 HOURS PRN
Status: DISCONTINUED | OUTPATIENT
Start: 2024-08-20 | End: 2024-08-21 | Stop reason: HOSPADM

## 2024-08-20 RX ORDER — AMOXICILLIN 250 MG
1 CAPSULE ORAL 2 TIMES DAILY
Status: DISCONTINUED | OUTPATIENT
Start: 2024-08-20 | End: 2024-08-21 | Stop reason: HOSPADM

## 2024-08-20 RX ORDER — MAGNESIUM HYDROXIDE 1200 MG/15ML
LIQUID ORAL PRN
Status: DISCONTINUED | OUTPATIENT
Start: 2024-08-20 | End: 2024-08-20 | Stop reason: HOSPADM

## 2024-08-20 RX ORDER — METHOCARBAMOL 500 MG/1
500 TABLET, FILM COATED ORAL EVERY 6 HOURS PRN
Status: DISCONTINUED | OUTPATIENT
Start: 2024-08-20 | End: 2024-08-21 | Stop reason: HOSPADM

## 2024-08-20 RX ORDER — HYDROMORPHONE HYDROCHLORIDE 1 MG/ML
0.2 INJECTION, SOLUTION INTRAMUSCULAR; INTRAVENOUS; SUBCUTANEOUS EVERY 5 MIN PRN
Status: DISCONTINUED | OUTPATIENT
Start: 2024-08-20 | End: 2024-08-20 | Stop reason: HOSPADM

## 2024-08-20 RX ORDER — PANTOPRAZOLE SODIUM 40 MG/1
40 TABLET, DELAYED RELEASE ORAL EVERY MORNING
Status: DISCONTINUED | OUTPATIENT
Start: 2024-08-21 | End: 2024-08-21 | Stop reason: HOSPADM

## 2024-08-20 RX ORDER — DIPHENHYDRAMINE HYDROCHLORIDE 50 MG/ML
12.5 INJECTION INTRAMUSCULAR; INTRAVENOUS EVERY 6 HOURS PRN
Status: DISCONTINUED | OUTPATIENT
Start: 2024-08-20 | End: 2024-08-20 | Stop reason: HOSPADM

## 2024-08-20 RX ORDER — DIPHENHYDRAMINE HCL 12.5MG/5ML
12.5 LIQUID (ML) ORAL EVERY 6 HOURS PRN
Status: DISCONTINUED | OUTPATIENT
Start: 2024-08-20 | End: 2024-08-21 | Stop reason: HOSPADM

## 2024-08-20 RX ORDER — HALOPERIDOL 5 MG/ML
1 INJECTION INTRAMUSCULAR
Status: DISCONTINUED | OUTPATIENT
Start: 2024-08-20 | End: 2024-08-20 | Stop reason: HOSPADM

## 2024-08-20 RX ORDER — FEXOFENADINE HCL 180 MG/1
180 TABLET ORAL EVERY MORNING
Status: DISCONTINUED | OUTPATIENT
Start: 2024-08-21 | End: 2024-08-21 | Stop reason: HOSPADM

## 2024-08-20 RX ORDER — FENTANYL CITRATE 50 UG/ML
INJECTION, SOLUTION INTRAMUSCULAR; INTRAVENOUS PRN
Status: DISCONTINUED | OUTPATIENT
Start: 2024-08-20 | End: 2024-08-20

## 2024-08-20 RX ORDER — CEFADROXIL 500 MG/1
500 CAPSULE ORAL 2 TIMES DAILY
Qty: 28 CAPSULE | Refills: 0 | Status: SHIPPED | OUTPATIENT
Start: 2024-08-20 | End: 2024-09-03

## 2024-08-20 RX ORDER — FLUTICASONE FUROATE AND VILANTEROL 200; 25 UG/1; UG/1
1 POWDER RESPIRATORY (INHALATION) DAILY
Status: DISCONTINUED | OUTPATIENT
Start: 2024-08-21 | End: 2024-08-21 | Stop reason: HOSPADM

## 2024-08-20 RX ORDER — OXYCODONE HYDROCHLORIDE 10 MG/1
10 TABLET ORAL EVERY 4 HOURS PRN
Status: DISCONTINUED | OUTPATIENT
Start: 2024-08-20 | End: 2024-08-21 | Stop reason: HOSPADM

## 2024-08-20 RX ORDER — ACETAMINOPHEN 325 MG/1
975 TABLET ORAL ONCE
Status: COMPLETED | OUTPATIENT
Start: 2024-08-20 | End: 2024-08-20

## 2024-08-20 RX ORDER — LABETALOL HYDROCHLORIDE 5 MG/ML
10 INJECTION, SOLUTION INTRAVENOUS
Status: DISCONTINUED | OUTPATIENT
Start: 2024-08-20 | End: 2024-08-20 | Stop reason: HOSPADM

## 2024-08-20 RX ORDER — CEFAZOLIN SODIUM/WATER 2 G/20 ML
2 SYRINGE (ML) INTRAVENOUS
Status: COMPLETED | OUTPATIENT
Start: 2024-08-20 | End: 2024-08-20

## 2024-08-20 RX ORDER — OXYCODONE HYDROCHLORIDE 5 MG/1
5-10 TABLET ORAL EVERY 4 HOURS PRN
Qty: 6 TABLET | Refills: 0 | Status: SHIPPED | OUTPATIENT
Start: 2024-08-20 | End: 2024-08-21

## 2024-08-20 RX ORDER — HYDROMORPHONE HYDROCHLORIDE 1 MG/ML
0.2 INJECTION, SOLUTION INTRAMUSCULAR; INTRAVENOUS; SUBCUTANEOUS
Status: DISCONTINUED | OUTPATIENT
Start: 2024-08-20 | End: 2024-08-21 | Stop reason: HOSPADM

## 2024-08-20 RX ADMIN — Medication 10 MG: at 08:42

## 2024-08-20 RX ADMIN — HYDROMORPHONE HYDROCHLORIDE 0.25 MG: 1 INJECTION, SOLUTION INTRAMUSCULAR; INTRAVENOUS; SUBCUTANEOUS at 08:53

## 2024-08-20 RX ADMIN — ESMOLOL HYDROCHLORIDE 30 MG: 10 INJECTION, SOLUTION INTRAVENOUS at 11:25

## 2024-08-20 RX ADMIN — SODIUM CHLORIDE, POTASSIUM CHLORIDE, SODIUM LACTATE AND CALCIUM CHLORIDE: 600; 310; 30; 20 INJECTION, SOLUTION INTRAVENOUS at 11:02

## 2024-08-20 RX ADMIN — Medication 30 MG: at 08:06

## 2024-08-20 RX ADMIN — SUGAMMADEX 200 MG: 100 INJECTION, SOLUTION INTRAVENOUS at 11:26

## 2024-08-20 RX ADMIN — Medication 10 MG: at 08:53

## 2024-08-20 RX ADMIN — MIDAZOLAM 2 MG: 1 INJECTION INTRAMUSCULAR; INTRAVENOUS at 07:52

## 2024-08-20 RX ADMIN — GABAPENTIN 100 MG: 100 CAPSULE ORAL at 06:50

## 2024-08-20 RX ADMIN — SODIUM CHLORIDE, POTASSIUM CHLORIDE, SODIUM LACTATE AND CALCIUM CHLORIDE: 600; 310; 30; 20 INJECTION, SOLUTION INTRAVENOUS at 07:52

## 2024-08-20 RX ADMIN — PROPOFOL 50 MG: 10 INJECTION, EMULSION INTRAVENOUS at 08:10

## 2024-08-20 RX ADMIN — GABAPENTIN 100 MG: 100 CAPSULE ORAL at 21:22

## 2024-08-20 RX ADMIN — OXYCODONE HYDROCHLORIDE 5 MG: 5 TABLET ORAL at 12:56

## 2024-08-20 RX ADMIN — DEXAMETHASONE SODIUM PHOSPHATE 8 MG: 4 INJECTION, SOLUTION INTRAMUSCULAR; INTRAVENOUS at 08:10

## 2024-08-20 RX ADMIN — MIDAZOLAM 1 MG: 1 INJECTION INTRAMUSCULAR; INTRAVENOUS at 08:18

## 2024-08-20 RX ADMIN — Medication 30 MG: at 10:16

## 2024-08-20 RX ADMIN — PHENYLEPHRINE HYDROCHLORIDE 100 MCG: 10 INJECTION INTRAVENOUS at 08:06

## 2024-08-20 RX ADMIN — ASPIRIN 81 MG: 81 TABLET, COATED ORAL at 21:23

## 2024-08-20 RX ADMIN — HYDROMORPHONE HYDROCHLORIDE 0.25 MG: 1 INJECTION, SOLUTION INTRAMUSCULAR; INTRAVENOUS; SUBCUTANEOUS at 11:05

## 2024-08-20 RX ADMIN — HYDROMORPHONE HYDROCHLORIDE 0.25 MG: 1 INJECTION, SOLUTION INTRAMUSCULAR; INTRAVENOUS; SUBCUTANEOUS at 09:17

## 2024-08-20 RX ADMIN — ACETAMINOPHEN 975 MG: 325 TABLET ORAL at 22:18

## 2024-08-20 RX ADMIN — Medication 10 MG: at 09:09

## 2024-08-20 RX ADMIN — HYDROMORPHONE HYDROCHLORIDE 0.25 MG: 1 INJECTION, SOLUTION INTRAMUSCULAR; INTRAVENOUS; SUBCUTANEOUS at 11:19

## 2024-08-20 RX ADMIN — SODIUM CITRATE AND CITRIC ACID MONOHYDRATE 30 ML: 500; 334 SOLUTION ORAL at 07:52

## 2024-08-20 RX ADMIN — PROPOFOL 50 MG: 10 INJECTION, EMULSION INTRAVENOUS at 08:16

## 2024-08-20 RX ADMIN — PHENYLEPHRINE HYDROCHLORIDE 100 MCG: 10 INJECTION INTRAVENOUS at 08:34

## 2024-08-20 RX ADMIN — SENNOSIDES AND DOCUSATE SODIUM 1 TABLET: 50; 8.6 TABLET ORAL at 21:22

## 2024-08-20 RX ADMIN — ESMOLOL HYDROCHLORIDE 20 MG: 10 INJECTION, SOLUTION INTRAVENOUS at 08:26

## 2024-08-20 RX ADMIN — LIDOCAINE HYDROCHLORIDE 100 MG: 20 INJECTION, SOLUTION INFILTRATION; PERINEURAL at 08:06

## 2024-08-20 RX ADMIN — CEFAZOLIN SODIUM 2 G: 2 INJECTION, SOLUTION INTRAVENOUS at 16:45

## 2024-08-20 RX ADMIN — PROPOFOL 50 MG: 10 INJECTION, EMULSION INTRAVENOUS at 08:42

## 2024-08-20 RX ADMIN — Medication 2 G: at 08:10

## 2024-08-20 RX ADMIN — FENTANYL CITRATE 100 MCG: 50 INJECTION INTRAMUSCULAR; INTRAVENOUS at 08:06

## 2024-08-20 RX ADMIN — VENLAFAXINE 37.5 MG: 37.5 TABLET ORAL at 22:17

## 2024-08-20 RX ADMIN — Medication 20 MG: at 10:01

## 2024-08-20 RX ADMIN — ACETAMINOPHEN 975 MG: 325 TABLET ORAL at 15:08

## 2024-08-20 RX ADMIN — Medication 10 MG: at 10:00

## 2024-08-20 RX ADMIN — SODIUM CHLORIDE: 9 INJECTION, SOLUTION INTRAVENOUS at 14:30

## 2024-08-20 RX ADMIN — ACETAMINOPHEN 975 MG: 325 TABLET, FILM COATED ORAL at 06:49

## 2024-08-20 RX ADMIN — Medication 50 MG: at 08:07

## 2024-08-20 RX ADMIN — PHENYLEPHRINE HYDROCHLORIDE 100 MCG: 10 INJECTION INTRAVENOUS at 08:31

## 2024-08-20 RX ADMIN — PROPOFOL 100 MG: 10 INJECTION, EMULSION INTRAVENOUS at 08:06

## 2024-08-20 RX ADMIN — TRANEXAMIC ACID 1950 MG: 650 TABLET ORAL at 06:49

## 2024-08-20 RX ADMIN — CELECOXIB 400 MG: 200 CAPSULE ORAL at 06:49

## 2024-08-20 RX ADMIN — ONDANSETRON 4 MG: 2 INJECTION INTRAMUSCULAR; INTRAVENOUS at 10:57

## 2024-08-20 RX ADMIN — Medication 30 MG: at 09:20

## 2024-08-20 ASSESSMENT — ACTIVITIES OF DAILY LIVING (ADL)
ADLS_ACUITY_SCORE: 31
ADLS_ACUITY_SCORE: 30
ADLS_ACUITY_SCORE: 31
ADLS_ACUITY_SCORE: 28
ADLS_ACUITY_SCORE: 30
ADLS_ACUITY_SCORE: 29
ADLS_ACUITY_SCORE: 29
ADLS_ACUITY_SCORE: 30
ADLS_ACUITY_SCORE: 31
ADLS_ACUITY_SCORE: 30
ADLS_ACUITY_SCORE: 30
ADLS_ACUITY_SCORE: 29
ADLS_ACUITY_SCORE: 30

## 2024-08-20 ASSESSMENT — ENCOUNTER SYMPTOMS: SEIZURES: 0

## 2024-08-20 NOTE — PHARMACY-ADMISSION MEDICATION HISTORY
Pharmacist Admission Medication History    Admission medication history is complete. The information provided in this note is only as accurate as the sources available at the time of the update.    Information Source(s): Patient and CareEverywhere/SureScripts via in-person    Pertinent Information: N/a    Changes made to PTA medication list:  Added: None  Deleted: None  Changed:   Losartan 50->100mg daily     Allergies reviewed with patient and updates made in EHR: yes    Medication History Completed By: Finn Cortes [facility] 8/20/2024 2:45 PM    PTA Med List   Medication Sig Last Dose    acetaminophen (TYLENOL) 325 MG tablet Take 2 tablets (650 mg) by mouth every 4 hours as needed for other (mild pain)     acetaminophen (TYLENOL) 500 MG tablet Take 1,000 mg by mouth 2 times daily Past Week    albuterol (PROAIR HFA/PROVENTIL HFA/VENTOLIN HFA) 108 (90 Base) MCG/ACT inhaler Inhale 2 puffs into the lungs every 6 hours as needed for shortness of breath, wheezing or cough Past Month    aspirin 81 MG EC tablet Take 1 tablet (81 mg) by mouth 2 times daily     budesonide-formoterol (SYMBICORT) 160-4.5 MCG/ACT Inhaler Inhale 2 puffs into the lungs 2 times daily 8/20/2024 at 0400    cefadroxil (DURICEF) 500 MG capsule Take 1 capsule (500 mg) by mouth 2 times daily for 14 days     celecoxib (CELEBREX) 200 MG capsule Take 1 capsule (200 mg) by mouth daily for 14 days Do not take within 6 hours of ibuprofen (MOTRIN, ADVIL) or ketorolac (TORADOL) if prescribed.     colestipol (COLESTID) 1 g tablet Take 2 g by mouth 2 times daily  8/19/2024    Dextromethorphan-guaiFENesin (MUCUS RELIEF DM)  MG TABS Take by mouth as needed One time daily More than a month    fexofenadine (ALLEGRA) 180 MG tablet Take 1 tablet (180 mg) by mouth daily (Patient taking differently: Take 180 mg by mouth every morning) 8/20/2024 at 0400    fluticasone (FLONASE) 50 MCG/ACT nasal spray Spray 1 spray into both nostrils daily 8/20/2024 at 0400     gabapentin (NEURONTIN) 100 MG capsule Take 1 capsule (100 mg) by mouth at bedtime for 1 day, THEN 2 capsules (200 mg) at bedtime for 6 days. Take one capsule by mouth at bedtime first night, then increase to two capsules by mouth at bedtime second night and following nights.     losartan (COZAAR) 50 MG tablet Take 100 mg by mouth every morning 8/19/2024    omeprazole (PRILOSEC) 20 MG DR capsule Take 20 mg by mouth every morning 8/20/2024 at 0400    oxyCODONE (ROXICODONE) 5 MG tablet Take 1-2 tablets (5-10 mg) by mouth every 4 hours as needed for moderate to severe pain     senna-docusate (SENOKOT-S/PERICOLACE) 8.6-50 MG tablet Take 1-2 tablets by mouth 2 times daily Take while on oral narcotics to prevent or treat constipation.     venlafaxine (EFFEXOR) 37.5 MG tablet Take 37.5 mg by mouth 2 times daily 8/20/2024 at 0400    vitamin D2 (ERGOCALCIFEROL) 64707 units (1250 mcg) capsule Take 50,000 Units by mouth once a week Saturdays Past Month    VITAMIN MIXTURE PO Place 1 patch onto the skin daily multiplus Past Week

## 2024-08-20 NOTE — PROGRESS NOTES
Admission Note    Reason for admission: Left Total Hip Arthoplasty  Primary team notified of pt arrival.  Admitted from: PACU  Via: cart  Accompanied by:   Belongings: Placed in closet; valuables sent home with family  Admission Required Doc Completed: Yes/No  Teaching: Orientation to unit and call light- call light within reach, use of console, meal times, when to call for the RN, and enforced importance of safety.  IV Access: Right and left PIV  Telemetry: Yes/No  Ht./Wt.: Completed  Code Status verified on armband: Yes/No  2 RN Skin Assessment Completed with: yes bertha and jovani  Suction/Ambu bag/Flowmeter at bedside: Yes    Pt status:     Temp:  [97.6  F (36.4  C)-98.2  F (36.8  C)] 97.6  F (36.4  C)  Pulse:  [66-93] 78  Resp:  [11-16] 12  BP: (100-160)/(54-90) 136/68  SpO2:  [95 %-100 %] 95 %

## 2024-08-20 NOTE — ANESTHESIA PROCEDURE NOTES
Airway       Patient location during procedure: OR       Procedure Start/Stop Times: 8/20/2024 8:11 AM  Staff -        CRNA: Georgina Daniels APRN CRNA       Other Anesthesia Staff: Mervin Souza       Performed By: NISREEN and CRNA  Consent for Airway        Urgency: elective  Indications and Patient Condition       Indications for airway management: haile-procedural       Induction type:intravenous       Mask difficulty assessment: 1 - vent by mask    Final Airway Details       Final airway type: endotracheal airway       Successful airway: ETT - single  Endotracheal Airway Details        ETT size (mm): 7.0       Cuffed: yes       Successful intubation technique: direct laryngoscopy       DL Blade Type: MAC 3       Grade View of Cords: 1       Adjucts: stylet       Position: Right       Measured from: gums/teeth       Secured at (cm): 23       Bite block used: None    Post intubation assessment        Placement verified by: capnometry, equal breath sounds and chest rise        Number of attempts at approach: 1       Secured with: tape       Ease of procedure: easy       Dentition: Intact    Medication(s) Administered   Medication Administration Time: 8/20/2024 8:11 AM        
No

## 2024-08-20 NOTE — PROGRESS NOTES
08/20/24 1603   Appointment Info   Signing Clinician's Name / Credentials (PT) Sybil Rosas,PT, DPT   Living Environment   People in Home spouse   Current Living Arrangements house   Home Accessibility stairs within home;stairs to enter home   Number of Stairs, Main Entrance 2   Stair Railings, Main Entrance railings safe and in good condition;railings on both sides of stairs   Number of Stairs, Within Home, Primary nine   Stair Railings, Within Home, Primary railings safe and in good condition;railing on left side (ascending)   Transportation Anticipated family or friend will provide   Living Environment Comments Split entry home, once on upper floor has access to everything she needs.   Self-Care   Usual Activity Tolerance excellent   Current Activity Tolerance good   Equipment Currently Used at Home none  (Has access to SEC and FWW but does not use normally, has been using SEC due to pain recently)   Fall history within last six months no   Activity/Exercise/Self-Care Comment Patient reports previous IND with mobility and ADLs.   General Information   Onset of Illness/Injury or Date of Surgery 08/20/24   Referring Physician Emmanuel Box MD   Patient/Family Therapy Goals Statement (PT) To go home   Pertinent History of Current Problem (include personal factors and/or comorbidities that impact the POC) Status post L LACEY on 8/20/24   Existing Precautions/Restrictions no hip IR;no hip ADD past midline;90 degree hip flexion   Weight-Bearing Status - LLE weight-bearing as tolerated   Weight-Bearing Status - RLE full weight-bearing   Cognition   Affect/Mental Status (Cognition) WNL   Orientation Status (Cognition) oriented x 3   Follows Commands (Cognition) WNL   Range of Motion (ROM)   Range of Motion ROM deficits secondary to surgical procedure   Strength (Manual Muscle Testing)   Strength (Manual Muscle Testing) strength is WFL   Bed Mobility   Bed Mobility sit-supine   Sit-Supine [place] (Bed  Mobility) contact guard;1 person assist   Transfers   Transfers sit-stand transfer   Maintains Weight-bearing Status (Transfers) able to maintain   Sit-Stand Transfer   Sit-Stand [place] (Transfers) contact guard;1 person assist   Assistive Device (Sit-Stand Transfers) walker, front-wheeled   Gait/Stairs (Locomotion)   Leslie Level (Gait) contact guard;1 person assist   Assistive Device (Gait) walker, front-wheeled   Distance in Feet (Gait) 10   Pattern (Gait) step-through   Deviations/Abnormal Patterns (Gait) antalgic;steve decreased;gait speed decreased;stride length decreased;weight shifting decreased   Maintains Weight-bearing Status (Gait) able to maintain   Comment, (Gait/Stairs) Unable to progress to stairs this session due to fatigue with gait; antidcipate patient would benefit from assist of 1 for safe stair navigation at this time   Balance   Balance Comments Patient benefits from BUE support on FWW for safe mobility at this time   Clinical Impression   Criteria for Skilled Therapeutic Intervention Yes, treatment indicated   PT Diagnosis (PT) Impaired functional mobility   Influenced by the following impairments weakness, pain   Functional limitations due to impairments Bed mobility, transfers, gait, stairs   Clinical Presentation (PT Evaluation Complexity) stable   Clinical Presentation Rationale Per clinical judgement   Clinical Decision Making (Complexity) low complexity   Planned Therapy Interventions (PT) balance training;bed mobility training;gait training;home exercise program;neuromuscular re-education;patient/family education;stair training;strengthening;transfer training   Risk & Benefits of therapy have been explained evaluation/treatment results reviewed;care plan/treatment goals reviewed;participants voiced agreement with care plan;participants included;patient   PT Total Evaluation Time   PT Eval, Low Complexity Minutes (19823) 8   Physical Therapy Goals   PT Frequency Daily   PT  Predicted Duration/Target Date for Goal Attainment 08/27/24   PT Goals Bed Mobility;Transfers;Gait;Stairs;PT Goal 1   PT: Bed Mobility Supervision/stand-by assist;Supine to/from sit;Within precautions   PT: Transfers Modified independent;Sit to/from stand;Bed to/from chair;Assistive device;Within precautions   PT: Gait Modified independent;Assistive device;Greater than 200 feet;Within precautions   PT: Stairs Supervision/stand-by assist;9 stairs;Rail on left   PT: Goal 1 Patient will be IND with HEP in order to improve strength and overall IND with mobility   Interventions   Interventions Quick Adds Gait Training;Therapeutic Activity;Therapeutic Procedure   Therapeutic Procedure/Exercise   Ther. Procedure: strength, endurance, ROM, flexibillity Minutes (16804) 8   Symptoms Noted During/After Treatment fatigue;increased pain   Treatment Detail/Skilled Intervention Patient performed LACEY HEP exercises x 10 with verbal cueing and visual demonstration required for proper technique.   Therapeutic Activity   Therapeutic Activities: dynamic activities to improve functional performance Minutes (26556) 5   Symptoms Noted During/After Treatment Fatigue   Treatment Detail/Skilled Intervention Patient seated at EOB on arrival, agreeable to therapy. Verbal cueing and visual demonstration provided for sit <> stand transfer technique to maintain posterior hip precautions; patient verbalized understanding, moderate carry over observed throughout session. Educated patient on posterior hip precautions; patient verbalized understanding. Toilet transfer with CGA x 1 and FWW, verbal cueing for hip precautions and safety, good follow through noted. Patient supine in bed with call light within reach at end of session.   Gait Training   Gait Training Minutes (40844) 8   Symptoms Noted During/After Treatment (Gait Training) fatigue   Treatment Detail/Skilled Intervention Patient ambulated in hallway an additional 165' with CGA x  1  initially, later progressed to SBA with FWW. Demonstrates decreased gait speed and step length; overall no overt LOB noted.   PT Discharge Planning   PT Plan Bed mobility, transfers, gait, LE ex, stairs   PT Discharge Recommendation (DC Rec) other (see comments)  (defer to ortho)   PT Rationale for DC Rec Patient currently requires assist of 1 for bed mobility, SBA for transfers and gait. She lives in a house with her  who is able to provide assistance as needed. She has 2 stairs to enter her home and 9 stairs to reach her bedroom; at this time she has not attempted stairs.   PT Brief overview of current status Assist of 1 for bed mobility, transfers, and gait   Total Session Time   Timed Code Treatment Minutes 21   Total Session Time (sum of timed and untimed services) 29     Sybil Rosas, PT, DPT    [facility]  OUTPATIENT PHYSICAL THERAPY EVALUATION  PLAN OF TREATMENT FOR OUTPATIENT REHABILITATION  (COMPLETE FOR INITIAL CLAIMS ONLY)  Patient's Last Name, First Name, M.I.  YOB: 1955  GodwinMary Lou monet  VIANCA                        Provider's Name  [facility] Medical Record No.  [id number]                             Onset Date:  08/20/24   Start of Care Date:      Type:     _X_PT   ___OT   ___SLP Medical Diagnosis:                 PT Diagnosis:  Impaired functional mobility Visits from SOC:  1     See note for plan of treatment, functional goals and certification details    I CERTIFY THE NEED FOR THESE SERVICES FURNISHED UNDER        THIS PLAN OF TREATMENT AND WHILE UNDER MY CARE     (Physician co-signature of this document indicates review and certification of the therapy plan).

## 2024-08-20 NOTE — PLAN OF CARE
"Goal Outcome Evaluation:      Plan of Care Reviewed With: patient    Overall Patient Progress: improvingOverall Patient Progress: improving      VS: /68   Pulse 78   Temp 97.4  F (36.3  C) (Oral)   Resp 14   Ht 1.626 m (5' 4\")   Wt 94.5 kg (208 lb 5.4 oz)   SpO2 96%   BMI 35.76 kg/m       O2: 96% on R/A   Output: Continent of bowel and bladder,    Last BM:    Activity: Up with one assist   Up for meals? bedside   Skin: Dressing to left hip clean dry and intact   Pain: Schedule tylenol   CMS: intact   Dressing: Clean dry and intact   Diet: regular   LDA: Right and left PIV   Equipment: walker   Plan: Cont. poc   Additional Info: Pt. remain alert and oriented, dressing to left hip, clean dry and intact, CMS intact, denies chest pain, SOB, N/V, worked with PT, ambulated [address] with walker and transfer belt. Hip abductory pillow in use, voided once, WBAT, cough and deep breathing encouraged, ice applied to surgical site on and off.           "
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
## 2024-08-20 NOTE — ANESTHESIA POSTPROCEDURE EVALUATION
Patient: Mary Lou Connelly    Procedure: Procedure(s):  ARTHROPLASTY, HIP, TOTAL       Anesthesia Type:  General    Note:  Disposition: Admission   Postop Pain Control: Uneventful            Sign Out: Well controlled pain   PONV: No   Neuro/Psych: Uneventful            Sign Out: Acceptable/Baseline neuro status   Airway/Respiratory: Uneventful            Sign Out: Acceptable/Baseline resp. status   CV/Hemodynamics: Uneventful            Sign Out: Acceptable CV status; No obvious hypovolemia; No obvious fluid overload   Other NRE: NONE   DID A NON-ROUTINE EVENT OCCUR? No           Last vitals:  Vitals Value Taken Time   /90 08/20/24 1300   Temp 36.7  C (98.1  F) 08/20/24 1300   Pulse 79 08/20/24 1314   Resp 13 08/20/24 1314   SpO2 99 % 08/20/24 1314   Vitals shown include unfiled device data.    Electronically Signed By: Shadia Sood MD  August 20, 2024  2:54 PM

## 2024-08-20 NOTE — PROGRESS NOTES
"PACU to Inpatient Nursing Handoff    Patient Mary Lou Connelly is a 68 year old female who speaks English.   Procedure Procedure(s):  ARTHROPLASTY, HIP, TOTAL   Surgeon(s) Primary: Emmanuel Box MD  Fellow - Assisting: Berto Whatley MD     Allergies   Allergen Reactions    Nkda [No Known Drug Allergy]        Isolation  No active isolations     Past Medical History   has a past medical history of Cancer (H) (1996), Depressive disorder (04/2013), Hypertension goal BP (blood pressure) < 140/90, Obesity, Other secondary osteoarthritis of right hip (08/12/2020), and Seasonal allergies.    Anesthesia Choice   Dermatome Level     Preop Meds acetaminophen (Tylenol) - time given: 0649  celecoxib (Celebrex) - time given: 0649  gabapentin (Neurontin) - time given: 0650  TXA - time given: 0649   Nerve block Not applicable   Intraop Meds dexamethasone (Decadron)  fentanyl (Sublimaze): 100 mcg total  hydromorphone (Dilaudid): 1 mg total  ketamine (Ketalar): 50 mg given  ondansetron (Zofran): last given at 1057  Esmolol 20   Local Meds Yes - Local Cocktail (morphine, ropivacaine, epinephrine, Toradol)   Antibiotics cefazolin (Ancef) - last given at 0810     Pain Patient Currently in Pain: yes   PACU meds  oxycodone (Roxicodone): 5 mg (total dose) last given at 1255    PCA / epidural No   Capnography     Telemetry ECG Rhythm: Sinus rhythm   Inpatient Telemetry Monitor Ordered? No        Labs Glucose Lab Results   Component Value Date    GLC 98 08/20/2024     09/23/2020       Hgb Lab Results   Component Value Date    HGB 9.3 09/23/2020       INR No results found for: \"INR\"   PACU Imaging Completed     Wound/Incision Incision/Surgical Site 04/22/15 Left Leg (Active)   Number of days: 3408       Incision/Surgical Site 11/18/15 Right Leg (Active)   Number of days: 3198       Incision/Surgical Site 09/22/20 Right;Lateral Hip (Active)   Number of days: 1428       Incision/Surgical Site 08/20/24 Left;Lateral Hip (Active) "   Incision Assessment UTV 08/20/24 1240   Dressing Alginate;Transparent film (Opsite, Tegaderm) 08/20/24 1129   Closure Sutures;Approximated;Adhesive strip(s) 08/20/24 1129   Incision Drainage Amount None 08/20/24 1129   Dressing Intervention Clean, dry, intact 08/20/24 1240   Number of days: 0      CMS        Equipment ice pack   Other LDA       IV Access Peripheral IV 08/20/24 Anterior;Right Hand (Active)   Site Assessment L 08/20/24 1258   Line Status Infusing 08/20/24 1258   Dressing Transparent 08/20/24 1258   Dressing Status clean;dry;intact 08/20/24 1258   Phlebitis Scale 0-->no symptoms 08/20/24 1258   Infiltration? no 08/20/24 1258   Number of days: 0       Peripheral IV 08/20/24 Left Lower forearm (Active)   Site Assessment [facility] 08/20/24 1258   Line Status Saline locked 08/20/24 1258   Dressing Transparent 08/20/24 1258   Dressing Status clean;dry;intact 08/20/24 1258   Line Intervention Flushed 08/20/24 1134   Phlebitis Scale 0-->no symptoms 08/20/24 1258   Infiltration? no 08/20/24 1258   Number of days: 0       Peripheral IV 09/22/20 Left Hand (Active)   Number of days: 1428       Peripheral IV Right Lower forearm (Active)   Number of days:       Blood Products Not applicable  mL   Intake/Output Date 08/20/24 0700 - 08/21/24 0659   Shift 8645-3866 4661-1236 9243-7639 24 Hour Total   INTAKE   I.V. 1200   1200   Shift Total(mL/kg) 1200(12.7)   1200(12.7)   OUTPUT   Blood 200   200   Shift Total(mL/kg) 200(2.12)   200(2.12)   Weight (kg) 94.5 94.5 94.5 94.5      Drains / Viveros     Time of void PreOp Time of Void Prior to Procedure: 0500 (08/20/24 0639)    PostOp Straight cath: 700 mL (08/20/24 1300)    Diapered? No   Bladder Scan Bladder Scan Volume (mL): 625 ml (08/20/24 1235)   PO    water     Vitals    B/P: (!) 108/90 (moving arm while eating)  T: 98.1  F (36.7  C)    Temp src: Axillary  P:  Pulse: 88 (08/20/24 1300)          R: 15  O2:  SpO2: 98 %    O2 Device: Nasal cannula (08/20/24 1145)     Oxygen Delivery: 2 LPM (08/20/24 1145)         Family/support present significant other   Patient belongings     Patient transported on bed   DC meds/scripts (obs/outpt) Not applicable   Inpatient Pain Meds Released? Yes       Special needs/considerations None   Tasks needing completion None       Doron Munoz, RN

## 2024-08-20 NOTE — ANESTHESIA CARE TRANSFER NOTE
Patient: Mary Lou Connelly    Procedure: Procedure(s):  ARTHROPLASTY, HIP, TOTAL       Diagnosis: Status post hip surgery [Z98.890]  Diagnosis Additional Information: No value filed.    Anesthesia Type:   General     Note:    Oropharynx: oropharynx clear of all foreign objects  Level of Consciousness: awake  Oxygen Supplementation: face mask  Level of Supplemental Oxygen (L/min / FiO2): 6  Independent Airway: airway patency satisfactory and stable  Dentition: dentition unchanged  Vital Signs Stable: post-procedure vital signs reviewed and stable  Report to RN Given: handoff report given  Patient transferred to: PACU    Handoff Report: Identifed the Patient, Identified the Reponsible Provider, Reviewed the pertinent medical history, Discussed the surgical course, Reviewed Intra-OP anesthesia mangement and issues during anesthesia, Set expectations for post-procedure period and Allowed opportunity for questions and acknowledgement of understanding  Vitals:  Vitals Value Taken Time   /66 08/20/24 1135   Temp     Pulse 86 08/20/24 1138   Resp 13 08/20/24 1138   SpO2 98 % 08/20/24 1138   Vitals shown include unfiled device data.    Electronically Signed By: LUZ Peterson CRNA  August 20, 2024  11:38 AM

## 2024-08-20 NOTE — BRIEF OP NOTE
[facility]    Brief Operative Note    Pre-operative diagnosis: Status post hip surgery [Z98.890]  Post-operative diagnosis Osteoarthritis of left hip, chronic abductor tear left hip    Procedure: ARTHROPLASTY, HIP, TOTAL, Left - Hip    Surgeon: Surgeons and Role:     * Emmanuel Box MD - Primary     * Berto Whatley MD - Fellow - Assisting  Anesthesia: Choice   Estimated Blood Loss: 200 ml    Drains: None  Specimens: * No specimens in log *  Findings:   Chronic tendinopathy and chronic partial rupture of gluteus medius  Complications: None.  Implants:   Implant Name Type Inv. Item Serial No.  Lot No. LRB No. Used Action   TRIDENT II TRITANIUM CLUSTERHOLE 58F - [id number] Total Joint Component/Insert TRIDENT II TRITANIUM CLUSTERHOLE 58F  [employer] [id number] Left 1 Implanted   6.5MM LOW PROFILE HEX SCR 40MM - [id number] Metallic Hardware/[place] 6.5MM LOW PROFILE HEX SCR 40MM  [employer] [id number] Left 1 Implanted   6.5MM LOW PROFILE HEX SCR 50MM - [id number] Metallic Hardware/[place]arshall 6.5MM LOW PROFILE HEX SCR 50MM  [employer] [id number] Left 1 Implanted   IMP LINER HOWM ACETABULUM 36MM 621-10-36F - [id number] Total Joint Component/Insert IMP LINER HOWM ACETABULUM 36MM 621-10-36F  [employer] [id number] Left 1 Implanted   IMP STEM FEMORAL HIP STRK ACCOLADE II 132DEG SZ 6 2796-6118 - [id number] Total Joint Component/Insert IMP STEM FEMORAL HIP STRK ACCOLADE II 132DEG  6 4377-4612  [employer] [id number] Left 1 Implanted   IMP HEAD FEMORAL STRK BIOLOX DELTA CERAMIC V40 36MM - [id number] Total Joint Component/Insert IMP HEAD FEMORAL STRK BIOLOX DELTA CERAMIC V40 36MM  [employer] [id number] Left 1 Implanted         Plan:  WBAT LLE  Posterior hip precautions  No active abduction  Ancef while in house  Admit for pain control and observation  ASA 81 mg BID and SCDs DVT ppx  Multimodal pain regimen  IM, SW consults  PT  consult  Regular diet  Discharge on 2 weeks cefadroxil
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
## 2024-08-21 ENCOUNTER — APPOINTMENT (OUTPATIENT)
Dept: PHYSICAL THERAPY | Facility: CLINIC | Age: 69
End: 2024-08-21
Attending: ORTHOPAEDIC SURGERY
Payer: COMMERCIAL

## 2024-08-21 VITALS
TEMPERATURE: 98.7 F | HEIGHT: 64 IN | SYSTOLIC BLOOD PRESSURE: 149 MMHG | HEART RATE: 74 BPM | OXYGEN SATURATION: 94 % | DIASTOLIC BLOOD PRESSURE: 62 MMHG | WEIGHT: 208.34 LBS | RESPIRATION RATE: 18 BRPM | BODY MASS INDEX: 35.57 KG/M2

## 2024-08-21 LAB
ANION GAP SERPL CALCULATED.3IONS-SCNC: 10 MMOL/L (ref 7–15)
ANION GAP SERPL CALCULATED.3IONS-SCNC: 7 MMOL/L (ref 7–15)
BUN SERPL-MCNC: 11.4 MG/DL (ref 8–23)
BUN SERPL-MCNC: 9.7 MG/DL (ref 8–23)
CALCIUM SERPL-MCNC: 9.1 MG/DL (ref 8.8–10.4)
CALCIUM SERPL-MCNC: 9.3 MG/DL (ref 8.8–10.4)
CHLORIDE SERPL-SCNC: 93 MMOL/L (ref 98–107)
CHLORIDE SERPL-SCNC: 94 MMOL/L (ref 98–107)
CREAT SERPL-MCNC: 0.69 MG/DL (ref 0.51–0.95)
CREAT SERPL-MCNC: 0.73 MG/DL (ref 0.51–0.95)
EGFRCR SERPLBLD CKD-EPI 2021: 90 ML/MIN/1.73M2
EGFRCR SERPLBLD CKD-EPI 2021: >90 ML/MIN/1.73M2
GLUCOSE SERPL-MCNC: 136 MG/DL (ref 70–99)
GLUCOSE SERPL-MCNC: 91 MG/DL (ref 70–99)
HCO3 SERPL-SCNC: 24 MMOL/L (ref 22–29)
HCO3 SERPL-SCNC: 26 MMOL/L (ref 22–29)
HGB BLD-MCNC: 9.4 G/DL (ref 11.7–15.7)
OSMOLALITY UR: 552 MMOL/KG (ref 100–1200)
POTASSIUM SERPL-SCNC: 4.1 MMOL/L (ref 3.4–5.3)
POTASSIUM SERPL-SCNC: 4.3 MMOL/L (ref 3.4–5.3)
SODIUM SERPL-SCNC: 126 MMOL/L (ref 135–145)
SODIUM SERPL-SCNC: 128 MMOL/L (ref 135–145)
SODIUM UR-SCNC: <20 MMOL/L

## 2024-08-21 PROCEDURE — 80048 BASIC METABOLIC PNL TOTAL CA: CPT | Performed by: PHYSICIAN ASSISTANT

## 2024-08-21 PROCEDURE — 97530 THERAPEUTIC ACTIVITIES: CPT | Mod: GP

## 2024-08-21 PROCEDURE — 99214 OFFICE O/P EST MOD 30 MIN: CPT | Performed by: INTERNAL MEDICINE

## 2024-08-21 PROCEDURE — 36415 COLL VENOUS BLD VENIPUNCTURE: CPT | Performed by: STUDENT IN AN ORGANIZED HEALTH CARE EDUCATION/TRAINING PROGRAM

## 2024-08-21 PROCEDURE — 83935 ASSAY OF URINE OSMOLALITY: CPT | Performed by: INTERNAL MEDICINE

## 2024-08-21 PROCEDURE — 36415 COLL VENOUS BLD VENIPUNCTURE: CPT | Performed by: INTERNAL MEDICINE

## 2024-08-21 PROCEDURE — 84300 ASSAY OF URINE SODIUM: CPT | Performed by: INTERNAL MEDICINE

## 2024-08-21 PROCEDURE — 97116 GAIT TRAINING THERAPY: CPT | Mod: GP

## 2024-08-21 PROCEDURE — 999N000111 HC STATISTIC OT IP EVAL DEFER

## 2024-08-21 PROCEDURE — 250N000013 HC RX MED GY IP 250 OP 250 PS 637: Performed by: PHYSICIAN ASSISTANT

## 2024-08-21 PROCEDURE — 250N000011 HC RX IP 250 OP 636: Mod: JZ

## 2024-08-21 PROCEDURE — 80048 BASIC METABOLIC PNL TOTAL CA: CPT | Performed by: INTERNAL MEDICINE

## 2024-08-21 PROCEDURE — 85018 HEMOGLOBIN: CPT | Performed by: STUDENT IN AN ORGANIZED HEALTH CARE EDUCATION/TRAINING PROGRAM

## 2024-08-21 PROCEDURE — 258N000003 HC RX IP 258 OP 636: Performed by: INTERNAL MEDICINE

## 2024-08-21 PROCEDURE — 250N000013 HC RX MED GY IP 250 OP 250 PS 637: Performed by: STUDENT IN AN ORGANIZED HEALTH CARE EDUCATION/TRAINING PROGRAM

## 2024-08-21 RX ORDER — CELECOXIB 200 MG/1
200 CAPSULE ORAL DAILY
Qty: 14 CAPSULE | Refills: 0 | Status: SHIPPED | OUTPATIENT
Start: 2024-08-21 | End: 2024-10-03

## 2024-08-21 RX ORDER — CEFAZOLIN SODIUM 2 G/100ML
2 INJECTION, SOLUTION INTRAVENOUS
Status: DISCONTINUED | OUTPATIENT
Start: 2024-08-21 | End: 2024-08-21 | Stop reason: HOSPADM

## 2024-08-21 RX ORDER — GABAPENTIN 100 MG/1
CAPSULE ORAL
Qty: 14 CAPSULE | Refills: 0 | Status: SHIPPED | OUTPATIENT
Start: 2024-08-21 | End: 2024-10-03

## 2024-08-21 RX ORDER — ACETAMINOPHEN 325 MG/1
650 TABLET ORAL EVERY 4 HOURS PRN
Qty: 100 TABLET | Refills: 0 | Status: SHIPPED | OUTPATIENT
Start: 2024-08-21

## 2024-08-21 RX ORDER — OXYCODONE HYDROCHLORIDE 5 MG/1
5-10 TABLET ORAL EVERY 4 HOURS PRN
Qty: 26 TABLET | Refills: 0 | Status: SHIPPED | OUTPATIENT
Start: 2024-08-21 | End: 2024-10-03

## 2024-08-21 RX ORDER — AMOXICILLIN 250 MG
1-2 CAPSULE ORAL 2 TIMES DAILY
Qty: 30 TABLET | Refills: 0 | Status: SHIPPED | OUTPATIENT
Start: 2024-08-21 | End: 2024-10-03

## 2024-08-21 RX ORDER — ASPIRIN 81 MG/1
81 TABLET ORAL 2 TIMES DAILY
Qty: 60 TABLET | Refills: 0 | Status: SHIPPED | OUTPATIENT
Start: 2024-08-21

## 2024-08-21 RX ADMIN — MONTELUKAST SODIUM 2 G: 4 TABLET, CHEWABLE ORAL at 10:03

## 2024-08-21 RX ADMIN — FLUTICASONE PROPIONATE 1 SPRAY: 50 SPRAY, METERED NASAL at 08:13

## 2024-08-21 RX ADMIN — FEXOFENADINE HCL 180 MG: 180 TABLET ORAL at 08:11

## 2024-08-21 RX ADMIN — ACETAMINOPHEN 975 MG: 325 TABLET ORAL at 08:08

## 2024-08-21 RX ADMIN — VENLAFAXINE 37.5 MG: 37.5 TABLET ORAL at 08:11

## 2024-08-21 RX ADMIN — MONTELUKAST SODIUM 2 G: 4 TABLET, CHEWABLE ORAL at 00:06

## 2024-08-21 RX ADMIN — POLYETHYLENE GLYCOL 3350 17 G: 17 POWDER, FOR SOLUTION ORAL at 08:12

## 2024-08-21 RX ADMIN — FLUTICASONE FUROATE AND VILANTEROL TRIFENATATE 1 PUFF: 200; 25 POWDER RESPIRATORY (INHALATION) at 08:14

## 2024-08-21 RX ADMIN — CEFAZOLIN SODIUM 2 G: 2 INJECTION, SOLUTION INTRAVENOUS at 00:18

## 2024-08-21 RX ADMIN — SODIUM CHLORIDE, POTASSIUM CHLORIDE, SODIUM LACTATE AND CALCIUM CHLORIDE 250 ML: 600; 310; 30; 20 INJECTION, SOLUTION INTRAVENOUS at 10:21

## 2024-08-21 RX ADMIN — PANTOPRAZOLE SODIUM 40 MG: 40 TABLET, DELAYED RELEASE ORAL at 08:10

## 2024-08-21 RX ADMIN — ASPIRIN 81 MG: 81 TABLET, COATED ORAL at 08:10

## 2024-08-21 RX ADMIN — OXYCODONE HYDROCHLORIDE 5 MG: 5 TABLET ORAL at 08:10

## 2024-08-21 RX ADMIN — OXYCODONE HYDROCHLORIDE 5 MG: 5 TABLET ORAL at 12:44

## 2024-08-21 RX ADMIN — SENNOSIDES AND DOCUSATE SODIUM 1 TABLET: 50; 8.6 TABLET ORAL at 08:09

## 2024-08-21 ASSESSMENT — ACTIVITIES OF DAILY LIVING (ADL)
ADLS_ACUITY_SCORE: 36
ADLS_ACUITY_SCORE: 36
ADLS_ACUITY_SCORE: 32
ADLS_ACUITY_SCORE: 36
ADLS_ACUITY_SCORE: 31
ADLS_ACUITY_SCORE: 36
ADLS_ACUITY_SCORE: 31
ADLS_ACUITY_SCORE: 32
ADLS_ACUITY_SCORE: 36
ADLS_ACUITY_SCORE: 32
ADLS_ACUITY_SCORE: 36
ADLS_ACUITY_SCORE: 32
ADLS_ACUITY_SCORE: 31
ADLS_ACUITY_SCORE: 32
ADLS_ACUITY_SCORE: 36
ADLS_ACUITY_SCORE: 32

## 2024-08-21 NOTE — PROGRESS NOTES
[facility]    Medicine Progress Note - Hospitalist Service, GOLD TEAM 20    Date of Admission:  8/20/2024    Assessment & Plan   Mary Lou Connelly is a 68 year old female with PMHx of HTN, TANIA, pAfib, SVT, hx of Vtach, obesity s/p bariatric surgery, Hx of anal SCC s/p chemo and radiation in remission, chronic anemia, depression, anxiety, GERD, former smoker, and osteoarthritis of Left hip admitted on 8/20/2024 following an elective L LACEY. Medicine consulted for medical co-management.      # Post operative care following L LACEY  # Acute postop anemia on chronic anemia.  - Underwent the above proceudre on 8/20 with Dr. Emmanuel Box under GA.  ml.   - Intraoperatively found to have chronic tendinopathy and chronic partial rupture of gluteus medius. No reported complications. Received perioperative abx with ancef.  - Pain management per primary team: Tylenol 975 mg TID, gabapentin 100 mg at bedtime, robaxin 500 mg QID PRN, oxycodone 5-10 mg Q4H PRN and IV dilaudid 0.2-0.4 mg Q2H PRN breakthrough pain   - DVT ppx per primary team: ASA 81 mg BID   - Bowel regimen  - WBAT, posterior hip precautions. no active abduction   - PT/OT  -Hgb postop 9.4.  - Discharge on 2 weeks of cefadroxil per primary team for abx ppx following surgery     # Hyponatremia   - likely hypoveolemic  - give  ml   - check urine studies  - check afrternoon Na   -If sodium continues to improve, should be okay to discharge from medicine standpoint.     # HTN: Patient reports blood pressure has been persistently elevated to -150s even after her PCP increased her losartan last month.   - Hold home losartan 100 mg daily  -Okay to resume in the outpatient setting if patient's blood pressure checks are elevated.         # Hx of pAFib:  after bariatric surgery in 2020, spontaneous converted. [id number] 3. Not on AC due to small burden and no recurrence.   # Hx of Vtach: intraoperatively 9/2020  #  "Hx of SVT: Asymptomatic, noted on outpatient Holter monitor.   - Monitor on telemetry post op      # Chronic dyspnea  # Possible asthma: Former smoker. Chronic dyspnea with prior Cardiac work up with cardiac calcium CT score of 2 and TTE with normal EF.  Recently seen by pulmonology with Normal PFTs and chest imaging. Started on Symbicort and allergy medications.   - Continue home inhalers and fexofenadine      # TANIA: Continue home CPAP   # Anxiety, depression:  Continue home Effexor 37.5 mg BID   # Chronic anemia: Baseline hgb 11.0s over the last 2 years. Trend Hgb post op.   # GERD: Continue home PPI   # Hx of bariatric surgery: Avoid NSAIDs as able.   # Hx of anal SCC s/p chemo and radiation: in remission. Noted.   # Chronic diarrhea: Continue home Colestipol 2 grams BID           Diet: Advance Diet as Tolerated: Regular Diet Adult  Discharge Instruction - Regular Diet Adult    DVT Prophylaxis: Defer to primary service  Viveros Catheter: Not present  Lines: None     Cardiac Monitoring: ACTIVE order. Indication: Tachyarrhythmias, acute (48 hours)  Code Status: Full Code      Clinically Significant Risk Factors Present on Admission                  # Hypertension: Noted on problem list         # Obesity: Estimated body mass index is 35.76 kg/m  as calculated from the following:    Height as of this encounter: 1.626 m (5' 4\").    Weight as of this encounter: 94.5 kg (208 lb 5.4 oz).                    Disposition Plan     Medically Ready for Discharge: Anticipated Today if repeat BMP shows improvement in sodium, okay to discharge from medicine standpoint.  We recommend repeat BMP in 1 week with follow-up with PCP.             RICK HOOKS MD  Hospitalist Service, GOLD TEAM 20  [facility]  Securely message with [employer] (more info)  Text page via [facility] Paging/Directory   See signed in provider for up to date coverage " information  ______________________________________________________________________    Interval History   -She is currently afebrile and hemodynamically stable  -Per documentation, patient's pain was adequately controlled.  -She was actually able to work with PT following surgery yesterday.   -Worked well with therapy today  -Reports adequate pain control    Physical Exam   Vital Signs: Temp: 97.9  F (36.6  C) Temp src: Oral BP: 115/62 Pulse: 68   Resp: 15 SpO2: 96 % O2 Device: None (Room air) Oxygen Delivery: 1 LPM  Weight: 208 lbs 5.36 oz    General Appearance: Lying comfortably in bed, on room air, in no acute distress of discomfort  HEENT: PERRL: EOMI; moist mucous membrane w/o lesions  Neck: No JVD  Pulmonary: Clear to auscultation bilaterally, no wheezes or crackles  CVS: Regular rhythm, no murmurs, rubs or gallops  GI: BS (+), soft nontender, no rebound or guarding   Extremities: No lower extremity edema.  Skin: No rashes or lesions  Neurologic: A&O x3      Medical Decision Making       35 MINUTES SPENT BY ME on the date of service doing chart review, history, exam, documentation & further activities per the note.      Data   ------------------------- PAST 24 HR DATA REVIEWED -----------------------------------------------        Imaging results reviewed over the past 24 hrs:   Recent Results (from the past 24 hour(s))   XR Pelvis w Hip Port Left  1 View    Narrative    EXAM: XR PELVIS AND HIP PORTABLE LEFT 1 VIEW  LOCATION: [facility]  DATE: 8/20/2024    INDICATION: Status post Hip surgery  COMPARISON: 5/23/2024.      Impression    IMPRESSION: Expected postoperative changes status post recent left hip arthroplasty. Redemonstrated postoperative changes status post right hip arthroplasty. No hardware complication. Chronic fractures of the left superior right inferior pubic rami.   Vascular calcifications. Osteopenia. Degenerative changes in the lumbar spine and  sacroiliac joints. No definite new fracture within the limitations of osteopenia

## 2024-08-21 NOTE — PLAN OF CARE
Occupational Therapy: Orders received. Chart reviewed and discussed with care team.? Occupational Therapy not indicated due to pt near baseline, has previous hip surgery and has all needed AE, moving well per PT. Met with pt briefly and pt reports no concerns, reports familiar with all precautions and implications for ADL.? Defer discharge recommendations to ortho.? Will complete orders.

## 2024-08-21 NOTE — PLAN OF CARE
Physical Therapy Discharge Summary    Reason for therapy discharge:    All goals and outcomes met, no further needs identified.    Progress towards therapy goal(s). See goals on Care Plan in [facility] electronic health record for goal details.  Goals met    Therapy recommendation(s):    Continued therapy is recommended.  Rationale/Recommendations:  pt has met all IP PT goals, could benefit from OP PT to progress all mobility, ROM, strength post LACEY.

## 2024-08-21 NOTE — PROGRESS NOTES
Patient vital signs are at baseline: Yes  Patient able to ambulate as they were prior to admission or with assist devices provided by therapies during their stay:  Yes  Patient MUST void prior to discharge:  Yes  Patient able to tolerate oral intake:  Yes  Pain has adequate pain control using Oral analgesics:  Yes  Does patient have an identified :  Yes  Has goal D/C date and time been discussed with patient:  Yes       DISCHARGE SUMMARY    Pt discharging to: home  Transportation: family  AVS given and discussed: Pt was given AVS and pt states understanding of content. Pt has no further questions.   Stoplight Tool given and discussed: Yes, no further questions.  Medications given: Yes, discussed. No further questions.   Belongings returned: Yes, ensured all belongings packed and sent with pt. No items in security.   Comments: Escorted safely to elevators. Pt left at 3:15 pm

## 2024-08-21 NOTE — PROGRESS NOTES
Per chart review, no home care needs indicated at this time. Therapy recommends outpatient physical therapy. Provider to place order. RNCC available as needed.    Alena Sherman RN, BSN  Care Coordinator, 5 Ortho  Phone [phone]  Pager [phone]

## 2024-08-21 NOTE — PLAN OF CARE
Shift Events ( [id number] ):    Neuro: alert and oriented x 4  Pain: complained of 1-2/10 pain to L hip. Ice packs in place. Scheduled tylenol.  Pulmonary: O2 sats stable on room air. End tidal CO2 in place. Clear lung sounds throughout.  Cardiac: Sinus rhythm, tele in place. Normotensive.  GI/: No BM this shift. Voiding well.  Skin: Pt able to reposition self independently in bed, abductor wedge in place while in bed. Bruises to right arm and L hip. Skin tear under L abdominal fold. Hip incision to L hip, dressing CDI.  Lines: PIV to LFA. PIV to R hand, infusing NaCl at 75mL/hr.  Activity: SBA with walker and gait belt.  Diet: Regular    Recommendations for Next Shift: Pain management. Encourage activity and oral intake as tolerated.     Discharge Planning/Family Issues: TBD

## 2024-08-21 NOTE — PROGRESS NOTES
.Orthopedic Surgery Progress Note 08/21/2024    S: Patient seen this AM No acute events overnight. Pain controlled on current regimen. Denies any new numbness. Worked with PT yesterday.     O:  Temp: 97.9  F (36.6  C) Temp src: Oral BP: 115/62 Pulse: 68   Resp: 15 SpO2: 96 % O2 Device: None (Room air) Oxygen Delivery: 1 LPM    Exam:  Gen: alert and oriented, responds to questions appropriately  Resp: non-labored on RA  MSK:    LLE:  - Dressings c/d/i  - SILT femoral/tibial/sural/saphenous/DP/SP nerves  - Fires quad, TA, EHL, FHL, GaSC  - PT/DP pulses 2+, foot wwp    Assessment: Mary Lou Connelly is a 68 year old female s/p L primary LACEY on 8/20/24 with Dr. Box. Doing wellWell.    Plan:  Orthopedics primary, medicine co-management  Activity: Up with assist. Progress as tolerated. Posterior hip precautions. No active abduction.  Weight bearing status: WBAT.  Antibiotics: continue periop Ancef 3g until discharge  Diet: Progress diet as tolerated.  DVT prophylaxis: SCDs and mechanical while in the hospital. ASA 81 mg BID.   Elevation: Elevate operative extremity as tolerated.   Wound Care: Keep dressing in place until POD #7.  Pain management: Utilize all oral meds first, IV meds for severe breakthrough pain after PO meds given adequate time to take effect.  X-rays: Completed in PACU.  Therapy: PT/OT evaluate prior to discharge.  Consults: PT, OT, medicine. Appreciate assistance in caring for this patient.  Disposition: Pending progress with therapies, pain control on orals, and medical stability. Anticipate discharge to home on POD #1. Discharge home with cefadroxil x 2 weeks.   Follow-up: Clinic with Dr. Box in 2 weeks.    Orthopedic surgery staff for this patient is Dr. Box. Discussed.       Berto Whatley MD  Adult Reconstruction Fellow

## 2024-08-21 NOTE — OP NOTE
[facility] Orthopaedic Surgery  Operative Note            Procedure date 8/20/2024   Pre-operative diagnosis: Osteoarthritis of left hip, status post pelvic radiation   Post-operative diagnosis Osteoarthritis of left hip, chronic partial tear of gluteus medius   Procedure: Left total hip arthroplasty  Repair of chronic partial tear of gluteus medius    22 modifier added to this case due to the need for increased operative time  >50% over usual, and increased case difficulty secondary to the patient's radiation treatment to the area and as a result leading to increased scar tissue and disrupted tissue planes     Surgeon: Emmanuel Box MD   Assistants(s): Berto Whatley MD - Fellow   Anesthesia: General   Estimated blood loss: 200 ml   Total IV fluids: (See anesthesia record)   Blood transfusion: (See anesthesia record)   Total urine output: (See anesthesia record)   Drains: None     Specimens:  * No specimens in log *    Implants:  Implant Name Type Inv. Item Serial No.  Lot No. LRB No. Used Action   TRIDENT II TRITANIUM CLUSTERHOLE 58F - [id number] Total Joint Component/Insert TRIDENT II TRITANIUM CLUSTERHOLE 58F  [employer] [id number] Left 1 Implanted   6.5MM LOW PROFILE HEX SCR 40MM - [id number] Metallic Hardware/[employer] 6.5MM LOW PROFILE HEX SCR 40MM  [employer] [id number] Left 1 Implanted   6.5MM LOW PROFILE HEX SCR 50MM - [id number] Metallic Hardware/[employer]ogg 6.5MM LOW PROFILE HEX SCR 50MM  [employer] [id number] Left 1 Implanted   IMP LINER HOWM ACETABULUM 36MM 621-10-36F - [id number] Total Joint Component/Insert IMP LINER HOWM ACETABULUM 36MM 621-10-36F  [employer] [id number] Left 1 Implanted   IMP STEM FEMORAL HIP STRK ACCOLADE II 132DEG SZ 6 0968-5381 - [id number] Total Joint Component/Insert IMP STEM FEMORAL HIP STRK ACCOLADE II 132DEG SZ 6 [id number]  [employer] [facility] [id number] Left 1 Implanted   IMP HEAD FEMORAL STRK  BIOLOX DELTA CERAMIC V40 36MM - [id number] Total Joint Component/Insert IMP HEAD FEMORAL STRK BIOLOX DELTA CERAMIC V40 36MM  [employer] [id number] Left 1 Implanted       Findings:   Cartilage loss, irregularity of the femoral head, diffuse cartilage loss, osteophyte formation of the native acetabulum.  Extensive scarring and disrupted tissue planes secondary to the patient's radiation treatment to the area.  Acetabular component well positioned and secure.  Femoral component well positioned.  Intraoperative stability with full extension and ER, flexion to 90 with IR to 60, and in the sleep position.  Appropriate limb length.     Indications:  68-year-old patient with end-stage osteoarthritis of her left hip.  Physical examination demonstrated reduced range of motion and imaging demonstrated end-stage and severe osteoarthritis of the patient's left hip.  The patient had chronic left hip pain that was beginning to affect her quality of life and activities of daily living.  This chronic left hip pain was recalcitrant to continued conservative treatment.  As a result, the patient was indicated to undergo a left total hip arthroplasty.    Procedure details:  Patient was seen in the preoperative area where we again reviewed the risks, benefits, and alternatives to total hip arthroplasty.  Patient understands and agrees to the treatment plan as set forth. Informed written consent was obtained.  The operative left hip was marked with an indelible marker after patient confirmation of the operative site.  All the patient's questions were answered.  The patient was taken to the operating room and underwent induction of  General  anesthesia.  The patient was placed on the operating table in the lateral decubitus position with the operative site up.   An SCD was placed on the nonoperative leg.   Bony prominences were well padded and was secured to the table with the Wixon positioner. The patient was prepped and draped in  the normal sterile fashion.         After the completion of a timeout procedure a posterior approach to the hip joint was performed making a incision over the greater trochanter and taking this down through the subcutaneous tissue. The gluteus rudi and IT band were identified and then split in the direction of its fibers. A charnley retractor was placed and hemostasis was obtained.  Upon splinting the gluteus medius and the IT band, there is an extensive amount of scar tissue that was encountered around the proximal femur.  This included significant bursal tissue hypertrophy, and fibrosis around the vastus lateralis the abductors and the short external rotators.  As a result, a significant portion of the procedure was spent meticulously dissecting and identifying the appropriate tissue planes.  Up upon further dissecting, it became evident that the patient also had a chronic partial tear, about 50% of the gluteus medius insertion to the greater trochanter.  Appear to be more of an avulsion of the insertion with a significant amount of fibrous tissue interposed.  The avulsed portion of the gluteus medius was then identified and tagged with suture for planned repair and reconstruction later on in the case.  After the relevant structures had been dissected and identified and the extensive amount of scar tissue had been debrided, we turned our attention to exposing the proximal femur.    After internal rotation of the femur, the piriformis tendon and external rotators were detached.  A posterior capsulotomy was performed.  The capsule and pirformis was tagged using a #1 Vicryl suture.  Given the patient's history of radiation treatment a lot of the soft head contracted and there was a high degree of arthrofibrosis around the joint.  We then spent a significant amount of time performing releases in order to better mobilize the proximal femur and allow for dislocation.  Especially the capsular area, care was taken to  incise the inferior and superior capsule to relieve tension and the limb was positioned in the sleep position and the hip was dislocated.  The soft tissues under the greater trochanter were debrided, exposing the neck.  The femoral neck osteotomy was made in accordance with the preoperative plan, approximately 10 mm above the level of the lesser trochanter.  The femoral head was removed and noted to have diffuse, extensive cartilage loss.     The acetabulum was exposed with an anterior moran's crook retractor and posteroinferior retractor as well as a self-retaining retractor.  The remaining acetabular labrum was removed.  The patient was noted to have anterior and inferior osteophytes.  The pulvinar was removed and the medial wall was identified.  We began reaming with care to maintain the anterior and posterior walls.  We began with a size 50 reamer and reamed to 58.  At this point the sclerotic bone was removed back to healthy bleeding cancelleous bone.  The 58 G7 cup was placed.  It had excellent initial stability.  Two acetabular screws were placed further securing the cup.  Any prominent anterior and inferior osteophytes were debrided and carefully removed.  The acetabular component was irrigated and cleaned.  The definitive neutral liner was placed without complications.    Attention was turned to the femur, which was exposed in the standard fashion with a femoral elevator.  Care was taken to not damage the remaining portion of the intact insertion of the  gluteus medius.  A box osteotome and rongeur was used to remove residual lateral neck.  The canal was identified and reamed with a Charnley Awl.  The femoral broaches was positioned in the appropriate anteversion, care was taken to broach the lateral cancellous bone.  We broached, starting with a size 2, up to size 6.  Care was taken to minimize risk of calcar fracture.  The final trial fit well.  A standard offset neck was placed with a 0, 36 mm head  head.  The hip was reduced.  The stability was examined and  was found to be secure and stable in full extension and external rotation of > 45 degrees as well as flexion of 90 degrees combined with internal rotation > 45 degrees.  Leg lengths were judged to be within 5 mm of symmetry.     The hip was dislocated and the trial femoral component was removed.   The femur was exposed and the 6 stem with standard offset was placed and noted to fit well.  The hip was trialed with various head lengths and the 0 mm head had the best fit. .  Again, the  stability was examined and  was found to be secure and stable in full extension and external rotation of > 45 degrees as well as flexion of 90 degrees combined with internal rotation > 45 degrees.  Leg lengths were judged to be within 5 mm of symmetry as evaluated by limb palpation and the markings on the greater trochanter. The hip was dislocated and the trial head was removed.  The trunion was washed and dried and the final 36 mm head was impacted into place.  The hip was reduced and again stable as listed above.      The gluteus medius tendon was the reconstructed.  The trochanteric be was identified and the soft tissue dissected away to allow healing directly to bone.  After prep of the bony tissue bed, the tendon was brought down to the bone tissue and ethibond intraosseous suture were used to repair and inset the tendon. Furthermore, side to side ethibond sutures to the adjacent tendon and fibrous tissue were used to supplement and enhance the repair.      The hip was lavaged with dilute betaine irrigant followed by sterile saline.  Periarticular injection of ropivicaine, toradol, and epi was injected into the soft tissue.  The capsule was reattached to the greater trochanter using a 2 mm drill and suture passer.  We then utilized #1 Ethibond to perform a repair of the partial tear of the abductor tendons insertion.  This was repaired utilizing bone tunnels through the  greater trochanter.  The piriformis and external rotators were repaired to the medius tendon.  The IT and gluteal fascia was closed with #1 Vicryl figure of 8 interrupted sutures.  The deep dermal layer was closed with 0 Vicryl and 2-0 Vicryl. The skin was closed with 4-0 PDS, Dermabond and a sterile dressing consisting of alginate and Tegaderm. The patient was positioned supine and awoken from anesthesia.  The patient was transferred to the PACU in stable condition.    Postoperative plan:    The patient will be admitted to the floor for pain control and monitoring.    Weight bearing: Weightbearing as tolerated.  Anticoagulation: Aspirin 162 mg daily for 4 weeks as DVT prophylaxis  Precautions: Posterior hip precautions.  No active abduction.  PT/OT  Ancef 3 g while in house  Discharged with 2 weeks of cefadroxil  X-ray in PACU  Patient may discharge on postoperative day 1 after being evaluated by physical therapy   she will follow-up in clinic in 2 weeks for a wound check      Berto Whatley MD  Fellow    Attending MD (Dr. Emmanuel Box) Attestation:  I was present during the key portions of the procedure and I was immediately available for the entire procedure between opening and closing.    MD Georgina Robles Family Professor  Oncology and Adult Reconstructive Surgery  Dept Orthopaedic Surgery, [facility]

## 2024-08-29 ENCOUNTER — OFFICE VISIT (OUTPATIENT)
Dept: PULMONOLOGY | Facility: CLINIC | Age: 69
End: 2024-08-29
Payer: COMMERCIAL

## 2024-08-29 VITALS — SYSTOLIC BLOOD PRESSURE: 147 MMHG | OXYGEN SATURATION: 97 % | DIASTOLIC BLOOD PRESSURE: 70 MMHG | HEART RATE: 93 BPM

## 2024-08-29 DIAGNOSIS — J45.30 MILD PERSISTENT ASTHMA WITHOUT COMPLICATION: ICD-10-CM

## 2024-08-29 PROCEDURE — 99214 OFFICE O/P EST MOD 30 MIN: CPT | Performed by: PHYSICIAN ASSISTANT

## 2024-08-29 RX ORDER — BUDESONIDE AND FORMOTEROL FUMARATE DIHYDRATE 160; 4.5 UG/1; UG/1
2 AEROSOL RESPIRATORY (INHALATION) 2 TIMES DAILY
Qty: 30.6 G | Refills: 3 | Status: SHIPPED | OUTPATIENT
Start: 2024-08-29

## 2024-08-29 ASSESSMENT — ASTHMA QUESTIONNAIRES
QUESTION_5 LAST FOUR WEEKS HOW WOULD YOU RATE YOUR ASTHMA CONTROL: WELL CONTROLLED
ACT_TOTALSCORE: 22
QUESTION_4 LAST FOUR WEEKS HOW OFTEN HAVE YOU USED YOUR RESCUE INHALER OR NEBULIZER MEDICATION (SUCH AS ALBUTEROL): NOT AT ALL
QUESTION_2 LAST FOUR WEEKS HOW OFTEN HAVE YOU HAD SHORTNESS OF BREATH: ONCE OR TWICE A WEEK
ACT_TOTALSCORE: 22
QUESTION_3 LAST FOUR WEEKS HOW OFTEN DID YOUR ASTHMA SYMPTOMS (WHEEZING, COUGHING, SHORTNESS OF BREATH, CHEST TIGHTNESS OR PAIN) WAKE YOU UP AT NIGHT OR EARLIER THAN USUAL IN THE MORNING: NOT AT ALL
QUESTION_1 LAST FOUR WEEKS HOW MUCH OF THE TIME DID YOUR ASTHMA KEEP YOU FROM GETTING AS MUCH DONE AT WORK, SCHOOL OR AT HOME: A LITTLE OF THE TIME

## 2024-08-29 NOTE — NURSING NOTE
"Mary Lou Connelly's goals for this visit include:   Chief Complaint   Patient presents with    Asthma     Asthma follow-up       PCP: Suzy Herring Np    Referring Provider:  Referred Self, MD  No address on file      Initial BP (!) 147/70 (BP Location: Left arm, Patient Position: Sitting, Cuff Size: Adult Regular)   Pulse 93   SpO2 97%   Breastfeeding No  Estimated body mass index is 35.76 kg/m  as calculated from the following:    Height as of 8/20/24: 1.626 m (5' 4\").    Weight as of 8/20/24: 94.5 kg (208 lb 5.4 oz).    Medication Reconciliation: complete    Do you need any medication refills at today's visit? None    DARIUS Quevedo  Rheumatology/Infectious disease  [facility]   [phone]      "
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
## 2024-09-05 DIAGNOSIS — Z96.642 STATUS POST TOTAL REPLACEMENT OF LEFT HIP: Primary | ICD-10-CM

## 2024-09-12 ASSESSMENT — ACTIVITIES OF DAILY LIVING (ADL)
GOING_DOWN_1_FLIGHT_OF_STAIRS: SLIGHT DIFFICULTY
STANDING_FOR_15_MINUTES: SLIGHT DIFFICULTY
ADL_COUNT: 17
GETTING_INTO_AND_OUT_OF_AN_AVERAGE_CAR: MODERATE DIFFICULTY
WALKING_INITIALLY: SLIGHT DIFFICULTY
ADL_HIGHEST_POTENTIAL_SCORE: 68
SPORTS_TOTAL_ITEM_SCORE: 0
HOW_WOULD_YOU_RATE_YOUR_CURRENT_LEVEL_OF_FUNCTION?: ABNORMAL
SPORTS_SCORE(%): 0
ADL_SCORE(%): 0
STEPPING_UP_AND_DOWN_CURBS: SLIGHT DIFFICULTY
SPORTS_COUNT: 9
ABILITY_TO_PERFORM_ACTIVITY_WITH_YOUR_NORMAL_TECHNIQUE: MODERATE DIFFICULTY
GOING_UP_1_FLIGHT_OF_STAIRS: SLIGHT DIFFICULTY
PUTTING_ON_SOCKS_AND_SHOES: SLIGHT DIFFICULTY
SITTING_FOR_15_MINUTES: NO DIFFICULTY AT ALL
ROLLING_OVER_IN_BED: SLIGHT DIFFICULTY
HOS_ADL_ITEM_SCORE_TOTAL: 30
HOW_WOULD_YOU_RATE_YOUR_CURRENT_LEVEL_OF_FUNCTION_DURING_YOUR_SPORTS_RELATED_ACTIVITIES_FROM_0_TO_100_WITH_100_BEING_YOUR_LEVEL_OF_FUNCTION_PRIOR_TO_YOUR_HIP_PROBLEM_AND_0_BEING_THE_INABILITY_TO_PERFORM_ANY_OF_YOUR_USUAL_DAILY_ACTIVITIES?: 20
SPORTS_HIGHEST_POTENTIAL_SCORE: 36
LIGHT_TO_MODERATE_WORK: SLIGHT DIFFICULTY
LOW_IMPACT_ACTIVITIES_LIKE_FAST_WALKING: UNABLE TO DO
HOS_ADL_HIGHEST_POTENTIAL_SCORE: 44
ADL_TOTAL_ITEM_SCORE: 0
WALKING_FOR_APPROXIMATELY_10_MINUTES: SLIGHT DIFFICULTY
WALKING_15_MINUTES_OR_GREATER: MODERATE DIFFICULTY
GETTING_INTO_AND_OUT_OF_A_BATHTUB: MODERATE DIFFICULTY
PLEASE_INDICATE_YOR_PRIMARY_REASON_FOR_REFERRAL_TO_THERAPY:: HIP

## 2024-09-12 NOTE — PROGRESS NOTES
PHYSICAL THERAPY EVALUATION  Type of Visit: Evaluation    See electronic medical record for Abuse and Falls Screening details.            Subjective     REFERRAL DX: 8/20/2024  Status post hip surgery [Z98.890]  - Primary      HIP PRECAUTIONS: posterior hip precautions, no active abduction    SUBJECTIVE HISTORY: Patient is a 68 year old female presenting s/p L LACEY and repair of chronic partial tear of R glute med 8/20/2024. R hip replacement 4 years ago. Has had issues with both hips for about 10 years. Doing overall very well with recovery. Has been using a quad cane for ambulation in the last week. Taking Tylenol PRN.       Objective         Presenting condition or subjective complaint: Hip replacement  Date of onset: 08/20/24    Relevant medical history:     Dates & types of surgery:      Prior diagnostic imaging/testing results: X-ray     Prior therapy history for the same diagnosis, illness or injury: No      Living Environment  Social support: With a significant other or spouse   Type of home: House   Stairs to enter the home: Yes 2 Is there a railing: Yes     Ramp: No   Stairs inside the home: Yes 9 Is there a railing: Yes     Help at home: Home management tasks (cooking, cleaning); Home and Yard maintenance tasks  Equipment owned: Four-point cane; Walker; Grab bars     Employment: No    Hobbies/Interests: Quilting    Patient goals for therapy: Walk normally    HIP OBJECTIVE  GAIT: SBQC moderate Trendelenburg in L stance    STRENGTH:   Pain: - none + mild ++ moderate +++ severe  Strength Scale: 0-5/5 Right   Hip Flexion 3-   Hip Extension 4-   Hip Abduction 4-   Hip Internal Rotation 3+   Hip External Rotation 5   Knee Flexion 5   Knee Extension 5     FUNCTIONAL TESTS:  sit to stands with B UE assist      Assessment & Plan   CLINICAL IMPRESSIONS  Medical Diagnosis: L LACEY, glute tear repair    Treatment Diagnosis: L LACEY, glute tear repair   Impression/Assessment: Patient is a 68 year old female s/p LACEY and  "glute repair.  The following significant findings have been identified: Pain, Decreased ROM/flexibility, Decreased strength, Impaired balance, Edema, Impaired gait, Impaired muscle performance, and Decreased activity tolerance. These impairments interfere with their ability to perform self care tasks, household chores, household mobility, and community mobility as compared to previous level of function.     Clinical Decision Making (Complexity):  Clinical Presentation: Stable/Uncomplicated  Clinical Presentation Rationale: based on medical and personal factors listed in PT evaluation  Clinical Decision Making (Complexity): Low complexity    PLAN OF CARE  Treatment Interventions:  Modalities: Cryotherapy, Dry Needling, E-stim, Hot Pack  Interventions: Gait Training, Manual Therapy, Neuromuscular Re-education, Therapeutic Activity, Therapeutic Exercise, Self-Care/Home Management    Long Term Goals     PT Goal 1  Goal Description: Patient will be able to ambulate for > 30 minutes without aD  Rationale: to maximize safety and independence within the community  Target Date: 12/06/24  PT Goal 2  Goal Description: Patient will be able to complete 10x/10 sit to stands from 18\" or less, wihout UE assist  Rationale: to maximize safety and independence with performance of ADLs and functional tasks  Target Date: 12/06/24      Frequency of Treatment: 1x/week  Duration of Treatment: 12 weeks    Education Assessment:   Learner/Method: Patient  Education Comments: PTRx    Risks and benefits of evaluation/treatment have been explained.   Patient/Family/caregiver agrees with Plan of Care.     Evaluation Time:     PT Eval, Low Complexity Minutes (25411): 15       Signing Clinician: Meghan Lord PT        [facility]                                                                                   OUTPATIENT PHYSICAL THERAPY      PLAN OF TREATMENT FOR OUTPATIENT REHABILITATION   Patient's Last Name, First " Name, JOMARMIKENorma  Mary Lou Connelly YOB: 1955   Provider's Name   [facility]   Medical Record No.  [id number]     Onset Date: 08/20/24  Start of Care Date: 09/13/24     Medical Diagnosis:  L LACEY, glute tear repair      PT Treatment Diagnosis:  L LACEY, glute tear repair Plan of Treatment  Frequency/Duration: 1x/week/ 12 weeks    Certification date from 09/13/24 to 12/06/24         See note for plan of treatment details and functional goals     Meghan Lord, PT                         I CERTIFY THE NEED FOR THESE SERVICES FURNISHED UNDER        THIS PLAN OF TREATMENT AND WHILE UNDER MY CARE     (Physician attestation of this document indicates review and certification of the therapy plan).              Referring Provider:  Mina Miller    Initial Assessment  See Epic Evaluation- Start of Care Date: 09/13/24

## 2024-09-13 ENCOUNTER — THERAPY VISIT (OUTPATIENT)
Dept: PHYSICAL THERAPY | Facility: CLINIC | Age: 69
End: 2024-09-13
Payer: COMMERCIAL

## 2024-09-13 ENCOUNTER — ANCILLARY PROCEDURE (OUTPATIENT)
Dept: GENERAL RADIOLOGY | Facility: CLINIC | Age: 69
End: 2024-09-13
Attending: PHYSICIAN ASSISTANT
Payer: COMMERCIAL

## 2024-09-13 ENCOUNTER — OFFICE VISIT (OUTPATIENT)
Dept: ORTHOPEDICS | Facility: CLINIC | Age: 69
End: 2024-09-13
Payer: COMMERCIAL

## 2024-09-13 DIAGNOSIS — Z96.642 STATUS POST TOTAL REPLACEMENT OF LEFT HIP: ICD-10-CM

## 2024-09-13 DIAGNOSIS — Z98.890 STATUS POST HIP SURGERY: ICD-10-CM

## 2024-09-13 DIAGNOSIS — Z98.890 STATUS POST HIP SURGERY: Primary | ICD-10-CM

## 2024-09-13 PROCEDURE — 99024 POSTOP FOLLOW-UP VISIT: CPT | Performed by: PHYSICIAN ASSISTANT

## 2024-09-13 PROCEDURE — 97110 THERAPEUTIC EXERCISES: CPT | Mod: GP | Performed by: PHYSICAL THERAPIST

## 2024-09-13 PROCEDURE — 97161 PT EVAL LOW COMPLEX 20 MIN: CPT | Mod: GP | Performed by: PHYSICAL THERAPIST

## 2024-09-13 PROCEDURE — 73502 X-RAY EXAM HIP UNI 2-3 VIEWS: CPT | Performed by: RADIOLOGY

## 2024-09-13 ASSESSMENT — HOOS JR
RISING FROM SITTING: MILD
LYING IN BED (TURNING OVER, MAINTAINING HIP POSITION): MILD
GOING UP OR DOWN STAIRS: MILD
HOOS JR TOTAL INTERVAL SCORE: 73.47
SITTING: MILD
WALKING ON UNEVEN SURFACE: MILD

## 2024-09-13 NOTE — NURSING NOTE
Reason For Visit:   Chief Complaint   Patient presents with    Surgical Followup     DOS 8/20/2024 - Arthroplasty, Hip, Total - Left       There were no vitals taken for this visit.    Pain Assessment  Patient Currently in Pain: Yes  Patient's Stated Pain Goal: 2  0-10 Pain Scale: 2  Primary Pain Location: Hip (Left)  Pain Descriptors: Nidal    Rachid Alvarez CMA

## 2024-09-13 NOTE — PROGRESS NOTES
Chief Complaint: left hip check  Procedure date 8/20/2024   Pre-operative diagnosis: Osteoarthritis of left hip, status post pelvic radiation   Post-operative diagnosis Osteoarthritis of left hip, chronic partial tear of gluteus medius   Procedure: Left total hip arthroplasty  Repair of chronic partial tear of gluteus medius  22 modifier added to this case due to the need for increased operative time  >50% over usual, and increased case difficulty secondary to the patient's radiation treatment to the area and as a result leading to increased scar tissue and disrupted tissue planes   Surgeon: Emmanuel Box MD       HPI: Mary Lou is a 68-year-old female who is here 3 weeks status post left total hip arthroplasty.  She has a history of a right total hip arthroplasty by Dr. Box previously.  She reports that the recovery has been going well.  She uses a cane occasionally for ambulation.  She had a session of physical therapy today.  She is not really taking anything for pain.  She is taking her aspirin as directed.  She is back to driving.  No other concerns.    Physical Exam: Mary Lou is a pleasant 68-year-old female who is alert and oriented no apparent distress.  She has a mildly antalgic gait with 4-point cane today.  Her left hip incision is healing well.  There is a small scab present at the top of the incision.  No erythema or drainage.  Mild swelling.  No calf tenderness.  She is neurovascular intact distally.    Imaging: We did review her immediate postop images of the AP pelvis and lateral of the left hip.  This shows bilateral total hip arthroplasty.  No sign of hardware lucency or fracture.  Leg lengths appear equal.  There is some heterotopic ossification of the left hip.  There is a chronic fracture of the left pubic ramus.    Impression: 68-year-old female doing well status post left total hip arthroplasty    Plan: Okay to shower and get the incision wet.  No soaking in a tub or pool for 1 more week.  Okay to  use vitamin E oil or cocoa butter on the wound.  Continue to follow hip precautions.  Call for antibiotics for any dental work.  Follow-up in 1 year for an x-ray check.  Sooner if any concerns.  She agrees with plan.  All questions answered.

## 2024-09-13 NOTE — LETTER
9/13/2024      Mary Lou Connelly  [address]  [facility] [address]      Dear Colleague,    Thank you for referring your patient, Mary Lou Connelly, to the [facility] [place]. Please see a copy of my visit note below.    Chief Complaint: left hip check  Procedure date 8/20/2024   Pre-operative diagnosis: Osteoarthritis of left hip, status post pelvic radiation   Post-operative diagnosis Osteoarthritis of left hip, chronic partial tear of gluteus medius   Procedure: Left total hip arthroplasty  Repair of chronic partial tear of gluteus medius  22 modifier added to this case due to the need for increased operative time  >50% over usual, and increased case difficulty secondary to the patient's radiation treatment to the area and as a result leading to increased scar tissue and disrupted tissue planes   Surgeon: Emmanuel Box MD       HPI: Mary Lou is a 68-year-old female who is here 3 weeks status post left total hip arthroplasty.  She has a history of a right total hip arthroplasty by Dr. Box previously.  She reports that the recovery has been going well.  She uses a cane occasionally for ambulation.  She had a session of physical therapy today.  She is not really taking anything for pain.  She is taking her aspirin as directed.  She is back to driving.  No other concerns.    Physical Exam: Mary Lou is a pleasant 68-year-old female who is alert and oriented no apparent distress.  She has a mildly antalgic gait with 4-point cane today.  Her left hip incision is healing well.  There is a small scab present at the top of the incision.  No erythema or drainage.  Mild swelling.  No calf tenderness.  She is neurovascular intact distally.    Imaging: We did review her immediate postop images of the AP pelvis and lateral of the left hip.  This shows bilateral total hip arthroplasty.  No sign of hardware lucency or fracture.  Leg lengths appear equal.  There is some heterotopic ossification of  the left hip.  There is a chronic fracture of the left pubic ramus.    Impression: 68-year-old female doing well status post left total hip arthroplasty    Plan: Okay to shower and get the incision wet.  No soaking in a tub or pool for 1 more week.  Okay to use vitamin E oil or cocoa butter on the wound.  Continue to follow hip precautions.  Call for antibiotics for any dental work.  Follow-up in 1 year for an x-ray check.  Sooner if any concerns.  She agrees with plan.  All questions answered.      Again, thank you for allowing me to participate in the care of your patient.        Sincerely,        Suzy Jacobson PA-C

## 2024-09-20 ENCOUNTER — THERAPY VISIT (OUTPATIENT)
Dept: PHYSICAL THERAPY | Facility: CLINIC | Age: 69
End: 2024-09-20
Payer: COMMERCIAL

## 2024-09-20 DIAGNOSIS — Z98.890 STATUS POST HIP SURGERY: Primary | ICD-10-CM

## 2024-09-20 DIAGNOSIS — J45.30 MILD PERSISTENT ASTHMA WITHOUT COMPLICATION: ICD-10-CM

## 2024-09-20 PROCEDURE — 97110 THERAPEUTIC EXERCISES: CPT | Mod: GP | Performed by: PHYSICAL THERAPIST

## 2024-09-20 RX ORDER — FEXOFENADINE HCL 180 MG/1
180 TABLET ORAL DAILY
Qty: 30 TABLET | Refills: 3 | Status: SHIPPED | OUTPATIENT
Start: 2024-09-20

## 2024-09-20 RX ORDER — FLUTICASONE PROPIONATE 50 MCG
1 SPRAY, SUSPENSION (ML) NASAL DAILY
Qty: 11.1 ML | Refills: 1 | Status: SHIPPED | OUTPATIENT
Start: 2024-09-20

## 2024-09-20 NOTE — TELEPHONE ENCOUNTER
Requested Prescriptions   Pending Prescriptions Disp Refills    fexofenadine (ALLEGRA) 180 MG tablet 30 tablet 3     Sig: Take 1 tablet (180 mg) by mouth daily.       There is no refill protocol information for this order     Last Written Prescription Date:  05/29/2024  Last Fill Quantity: 30 tablet,  # refills: 3   Last office visit: 5/29/2024 ; last virtual visit: Visit date not found with prescribing provider:  Octavio Lombardo MD    Future Office Visit:          fluticasone (FLONASE) 50 MCG/ACT nasal spray 11.1 mL 1     Sig: Spray 1 spray into both nostrils daily.       There is no refill protocol information for this order        Last Written Prescription Date:  05/29/2024  Last Fill Quantity: 11.1 mL,  # refills: 1   Last office visit: 5/29/2024 ; last virtual visit: Visit date not found with prescribing provider:  Octavio Lombardo MD    Future Office Visit:

## 2024-09-20 NOTE — TELEPHONE ENCOUNTER
fexofenadine (ALLEGRA) 180 MG tablet 30 tablet 3 5/29/2024 -- No   Sig - Route: Take 1 tablet (180 mg) by mouth daily - Oral         fluticasone (FLONASE) 50 MCG/ACT nasal spray 11.1 mL 1 5/29/2024 -- No   Sig - Route: Spray 1 spray into both nostrils daily - Both Nostrils       Last Office Visit: 8/29/24  Future Office visit:       Routing refill request to provider for review/approval because:  Allegra did not pass protocol.

## 2024-09-30 ENCOUNTER — TELEPHONE (OUTPATIENT)
Dept: ORTHOPEDICS | Facility: CLINIC | Age: 69
End: 2024-09-30
Payer: COMMERCIAL

## 2024-09-30 NOTE — TELEPHONE ENCOUNTER
Other: Patient injured the (L) hip which was the same hip she had replaced with Dr. Box on 8/20/24.     Patient is requesting a call back to discuss symptoms.    Could we send this information to you in [facility] or would you prefer to receive a phone call?:   Patient would prefer a phone call   Okay to leave a detailed message?: Yes at Home number on file [phone] (home)

## 2024-09-30 NOTE — TELEPHONE ENCOUNTER
Patient was called back and she stated that yesterday she was in the shower sitting and put her leg up on the edge to shave and leaned over and felt her hip shift and then was not able to stand up. She went to the ED by ambulance and they were able to get the hip back in place. Catrachito BOLAÑOS was consulted and he stated that she can follow up in 3 weeks and to maintain posterior hip precautions.  He also stated that she did not have to wear the knee immobilizer that she received  from the ED.  She stated that she is doing well right now.  She asked about PT and was told to take it easy for a couple of days and that she will need to restart back before she can continue to ramp up the PT.  She will call if there are any issues in the mean time and was agreeable with this plan.

## 2024-10-01 ASSESSMENT — SLEEP AND FATIGUE QUESTIONNAIRES
HOW LIKELY ARE YOU TO NOD OFF OR FALL ASLEEP WHILE SITTING AND TALKING TO SOMEONE: WOULD NEVER DOZE
HOW LIKELY ARE YOU TO NOD OFF OR FALL ASLEEP WHILE SITTING INACTIVE IN A PUBLIC PLACE: WOULD NEVER DOZE
HOW LIKELY ARE YOU TO NOD OFF OR FALL ASLEEP WHILE LYING DOWN TO REST IN THE AFTERNOON WHEN CIRCUMSTANCES PERMIT: HIGH CHANCE OF DOZING
HOW LIKELY ARE YOU TO NOD OFF OR FALL ASLEEP IN A CAR, WHILE STOPPED FOR A FEW MINUTES IN TRAFFIC: WOULD NEVER DOZE
HOW LIKELY ARE YOU TO NOD OFF OR FALL ASLEEP WHILE SITTING AND READING: SLIGHT CHANCE OF DOZING
HOW LIKELY ARE YOU TO NOD OFF OR FALL ASLEEP WHILE WATCHING TV: SLIGHT CHANCE OF DOZING
HOW LIKELY ARE YOU TO NOD OFF OR FALL ASLEEP WHEN YOU ARE A PASSENGER IN A CAR FOR AN HOUR WITHOUT A BREAK: WOULD NEVER DOZE
HOW LIKELY ARE YOU TO NOD OFF OR FALL ASLEEP WHILE SITTING QUIETLY AFTER LUNCH WITHOUT ALCOHOL: SLIGHT CHANCE OF DOZING

## 2024-10-03 ENCOUNTER — OFFICE VISIT (OUTPATIENT)
Dept: SLEEP MEDICINE | Facility: CLINIC | Age: 69
End: 2024-10-03
Payer: COMMERCIAL

## 2024-10-03 VITALS
DIASTOLIC BLOOD PRESSURE: 81 MMHG | SYSTOLIC BLOOD PRESSURE: 128 MMHG | RESPIRATION RATE: 16 BRPM | HEIGHT: 64 IN | BODY MASS INDEX: 35.51 KG/M2 | OXYGEN SATURATION: 97 % | WEIGHT: 208 LBS | HEART RATE: 71 BPM

## 2024-10-03 DIAGNOSIS — G47.33 OSA (OBSTRUCTIVE SLEEP APNEA): Primary | ICD-10-CM

## 2024-10-03 PROBLEM — E66.812 CLASS 2 SEVERE OBESITY DUE TO EXCESS CALORIES WITH SERIOUS COMORBIDITY IN ADULT (H): Chronic | Status: ACTIVE | Noted: 2024-05-23

## 2024-10-03 PROBLEM — T14.8XXA HEMATOMA: Status: RESOLVED | Noted: 2017-05-18 | Resolved: 2024-10-03

## 2024-10-03 PROBLEM — E66.01 CLASS 2 SEVERE OBESITY DUE TO EXCESS CALORIES WITH SERIOUS COMORBIDITY IN ADULT (H): Chronic | Status: ACTIVE | Noted: 2024-05-23

## 2024-10-03 PROCEDURE — 99203 OFFICE O/P NEW LOW 30 MIN: CPT | Performed by: PHYSICIAN ASSISTANT

## 2024-10-03 NOTE — PATIENT INSTRUCTIONS
Your Body mass index is 36.26 kg/m .  Weight management is a personal decision.  If you are interested in exploring weight loss strategies, the following discussion covers the approaches that may be successful. Body mass index (BMI) is one way to tell whether you are at a healthy weight, overweight, or obese. It measures your weight in relation to your height.  A BMI of 18.5 to 24.9 is in the healthy range. A person with a BMI of 25 to 29.9 is considered overweight, and someone with a BMI of 30 or greater is considered obese. More than two-thirds of American adults are considered overweight or obese.  Being overweight or obese increases the risk for further weight gain. Excess weight may lead to heart disease and diabetes.  Creating and following plans for healthy eating and physical activity may help you improve your health.  Weight control is part of healthy lifestyle and includes exercise, emotional health, and healthy eating habits. Careful eating habits lifelong are the mainstay of weight control. Though there are significant health benefits from weight loss, long-term weight loss with diet alone may be very difficult to achieve- studies show long-term success with dietary management in less than 10% of people. Attaining a healthy weight may be especially difficult to achieve in those with severe obesity. In some cases, medications, devices and surgical management might be considered.  What can you do?  If you are overweight or obese and are interested in methods for weight loss, you should discuss this with your provider.   Consider reducing daily calorie intake by 500 calories.   Keep a food journal.   Avoiding skipping meals, consider cutting portions instead.    Diet combined with exercise helps maintain muscle while optimizing fat loss. Strength training is particularly important for building and maintaining muscle mass. Exercise helps reduce stress, increase energy, and improves fitness. Increasing  exercise without diet control, however, may not burn enough calories to loose weight.     Start walking three days a week 10-20 minutes at a time  Work towards walking thirty minutes five days a week   Eventually, increase the speed of your walking for 1-2 minutes at time    In addition, we recommend that you review healthy lifestyles and methods for weight loss available through the National [employer] of [employer] patient information sites:  http://win.niddk.nih.gov/publications/index.htm    And look into health and wellness programs that may be available through your health insurance provider, employer, local community center, or [employer].
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
## 2024-10-03 NOTE — NURSING NOTE
"Chief Complaint   Patient presents with    CPAP Follow Up     Re-establishing care and needs a new CPAP due to recall device.        Initial /81   Pulse 71   Resp 16   Ht 1.613 m (5' 3.5\")   Wt 94.3 kg (208 lb)   SpO2 97%   BMI 36.26 kg/m   Estimated body mass index is 36.26 kg/m  as calculated from the following:    Height as of this encounter: 1.613 m (5' 3.5\").    Weight as of this encounter: 94.3 kg (208 lb).    Medication Reconciliation: complete    Neck circumference: 15.7 inches / 40 centimeters.    DME: Elmo Kilgore CMA on 10/3/2024 at 2:46 PM       "

## 2024-10-07 ENCOUNTER — THERAPY VISIT (OUTPATIENT)
Dept: PHYSICAL THERAPY | Facility: CLINIC | Age: 69
End: 2024-10-07
Payer: COMMERCIAL

## 2024-10-07 DIAGNOSIS — Z98.890 STATUS POST HIP SURGERY: Primary | ICD-10-CM

## 2024-10-07 PROCEDURE — 97110 THERAPEUTIC EXERCISES: CPT | Mod: GP | Performed by: PHYSICAL THERAPIST

## 2024-10-07 PROCEDURE — 97112 NEUROMUSCULAR REEDUCATION: CPT | Mod: GP | Performed by: PHYSICAL THERAPIST

## 2024-10-08 ENCOUNTER — DOCUMENTATION ONLY (OUTPATIENT)
Dept: SLEEP MEDICINE | Facility: CLINIC | Age: 69
End: 2024-10-08
Payer: COMMERCIAL

## 2024-10-08 DIAGNOSIS — G47.33 OBSTRUCTIVE SLEEP APNEA (ADULT) (PEDIATRIC): Primary | ICD-10-CM

## 2024-10-08 NOTE — PROGRESS NOTES
Patient was offered choice of vendor and chose [facility].  Patient Mary Lou Connelly was set up at [facility] on October 8, 2024. Patient received a Resmed Airsense 10 Pressures were set at  4-10 cm H2O.   Patient s ramp is 4 cm H2O for Auto and FLEX/EPR is EPR, 2.  Patient received a Resmed Mask name: Mirage FX for Her Nasal mask size Small, heated tubing and heated humidifier.  Patient has the following compliance requirements: using and visit requirements.  Patient will make a follow up on with MARGUERITE Wolfe

## 2024-10-15 NOTE — PROGRESS NOTES
[facility]  Orthopaedic Surgery, Joint Replacement Consultation  by Emmanuel Box M.D.    Mary Lou Connelly MRN# [id number]    YOB: 1955     Requesting physician: MD NOMI Hayden Jennifer Np     Background history:  DX:   Squamous cell cancer of anus 22 yrs ago  S/p pelvic radiation and R thigh nodes  DJD R hip, possibly secondary to Post-radiation  Insufficiency fractures, radiation related superior pubic ramus.     TREATMENTS:  22 yrs ago, chemotherapy, Tejinder Rodriguez MD, [facility]  22 years ago, radiation therapy to pelvis and left hip, Dr. Real, [facility]  9/22/20, Right total hip arthroplasty with femoral head autograft reconstruction of acetabular protrusio, Osbaldo Box MD, [facility]   8/20/24, Left total hip arthroplasty, (Isauro), [facility]  9/29/2024, closed reduction of dislocated hip.    This patient returns to see me for recheck after sustaining a acute postoperative dislocation right total hip arthroplasty.  She underwent closed reduction successfully and she has not had problems since then.  She reports that she was bathing and bent over to reach her foot when she sustained the dislocation.  She did not have much pain at the time but describes hearing a pop and then subsequently grinding sound and the hip dislocated.    Examination demonstrates intact peroneal function and she is able to fully weight-bear.  She is using a cane in the contralateral hand.    Radiographs demonstrate a concentrically located hip.    Impression:  Posterior dislocation of left LACEY.    Plan:  I counseled her about appropriate precautions to avoid a posterior hip joint dislocation.  It would be very important for her to maintain abduction of the limb and avoid internal rotation.  I have given her suggestions how to obtain this.  I have also given her practical advice about clothing, getting dressed and how to use assistive devices appropriately.    If she can avoid no dislocation of the next  several months, it is unlikely that will recur.  However if she develops recurrent dislocation, then revision arthroplasty might be necessary.  We discussed optimization of acetabular cup position that may be 1 option to pursue should she develop recurrent dislocation.    I will see her at her 1 year anniversary or sooner as needed.      MD Georgina Robles Family Professor  Oncology and Adult Reconstructive Surgery  Dept Orthopaedic Surgery, [facility]  [phone] office, [phone] pager  [email or web address]    Total combined visit time and work time before and after clinic visit, independent of trainee, on encounter date = 20 min

## 2024-10-18 ENCOUNTER — THERAPY VISIT (OUTPATIENT)
Dept: PHYSICAL THERAPY | Facility: CLINIC | Age: 69
End: 2024-10-18
Payer: COMMERCIAL

## 2024-10-18 DIAGNOSIS — Z98.890 STATUS POST HIP SURGERY: Primary | ICD-10-CM

## 2024-10-18 PROCEDURE — 97110 THERAPEUTIC EXERCISES: CPT | Mod: GP | Performed by: PHYSICAL THERAPIST

## 2024-10-18 PROCEDURE — 97112 NEUROMUSCULAR REEDUCATION: CPT | Mod: GP | Performed by: PHYSICAL THERAPIST

## 2024-10-18 ASSESSMENT — HOOS JR
SITTING: MILD
HOOS JR TOTAL INTERVAL SCORE: 76.78
RISING FROM SITTING: MILD
GOING UP OR DOWN STAIRS: MILD
LYING IN BED (TURNING OVER, MAINTAINING HIP POSITION): MILD

## 2024-10-21 ENCOUNTER — ANCILLARY PROCEDURE (OUTPATIENT)
Dept: GENERAL RADIOLOGY | Facility: CLINIC | Age: 69
End: 2024-10-21
Attending: ORTHOPAEDIC SURGERY
Payer: COMMERCIAL

## 2024-10-21 ENCOUNTER — OFFICE VISIT (OUTPATIENT)
Dept: ORTHOPEDICS | Facility: CLINIC | Age: 69
End: 2024-10-21
Payer: COMMERCIAL

## 2024-10-21 DIAGNOSIS — Z96.642 STATUS POST TOTAL REPLACEMENT OF LEFT HIP: Primary | ICD-10-CM

## 2024-10-21 DIAGNOSIS — Z96.642 STATUS POST TOTAL REPLACEMENT OF LEFT HIP: ICD-10-CM

## 2024-10-21 DIAGNOSIS — Z98.890 S/P CLOSED REDUCTION OF DISLOCATED TOTAL HIP PROSTHESIS: Primary | ICD-10-CM

## 2024-10-21 PROCEDURE — 73502 X-RAY EXAM HIP UNI 2-3 VIEWS: CPT | Mod: LT | Performed by: RADIOLOGY

## 2024-10-21 PROCEDURE — 99024 POSTOP FOLLOW-UP VISIT: CPT | Performed by: ORTHOPAEDIC SURGERY

## 2024-10-21 NOTE — LETTER
10/21/2024      Mary Lou Connelly  [address]  [place] [address]      Dear Colleague,    Thank you for referring your patient, Mary Lou Connelly, to the [facility] [place]. Please see a copy of my visit note below.        [facility]  Orthopaedic Surgery, Joint Replacement Consultation  by Emmanuel Box M.D.    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     TREATMENTS:  22 yrs ago, chemotherapy, Tejinder Rodriguez MD, [facility]  22 years ago, radiation therapy to pelvis and left hip, Dr. Real, [facility]  9/22/20, Right total hip arthroplasty with femoral head autograft reconstruction of acetabular protrusio, Osbaldo Box MD, [facility]   8/20/24, Left total hip arthroplasty, (Isauro), [facility]  9/29/2024, closed reduction of dislocated hip.    This patient returns to see me for recheck after sustaining a acute postoperative dislocation right total hip arthroplasty.  She underwent closed reduction successfully and she has not had problems since then.  She reports that she was bathing and bent over to reach her foot when she sustained the dislocation.  She did not have much pain at the time but describes hearing a pop and then subsequently grinding sound and the hip dislocated.    Examination demonstrates intact peroneal function and she is able to fully weight-bear.  She is using a cane in the contralateral hand.    Radiographs demonstrate a concentrically located hip.    Impression:  Posterior dislocation of left LACEY.    Plan:  I counseled her about appropriate precautions to avoid a posterior hip joint dislocation.  It would be very important for her to maintain abduction of the  limb and avoid internal rotation.  I have given her suggestions how to obtain this.  I have also given her practical advice about clothing, getting dressed and how to use assistive devices appropriately.    If she can avoid no dislocation of the next several months, it is unlikely that will recur.  However if she develops recurrent dislocation, then revision arthroplasty might be necessary.  We discussed optimization of acetabular cup position that may be 1 option to pursue should she develop recurrent dislocation.    I will see her at her 1 year anniversary or sooner as needed.      Emmanuel Box MD  [facility] Family Professor  Oncology and Adult Reconstructive Surgery  Dept Orthopaedic Surgery, [facility]  [phone] office, [phone] pager  [email or web address]    Total combined visit time and work time before and after clinic visit, independent of trainee, on encounter date = 20 min      Again, thank you for allowing me to participate in the care of your patient.        Sincerely,        Emmanuel Box MD

## 2024-10-22 ENCOUNTER — THERAPY VISIT (OUTPATIENT)
Dept: PHYSICAL THERAPY | Facility: CLINIC | Age: 69
End: 2024-10-22
Payer: COMMERCIAL

## 2024-10-22 DIAGNOSIS — Z98.890 STATUS POST HIP SURGERY: Primary | ICD-10-CM

## 2024-10-22 PROCEDURE — 97110 THERAPEUTIC EXERCISES: CPT | Mod: GP | Performed by: PHYSICAL THERAPIST

## 2024-10-22 PROCEDURE — 97112 NEUROMUSCULAR REEDUCATION: CPT | Mod: GP | Performed by: PHYSICAL THERAPIST

## 2024-11-01 ENCOUNTER — THERAPY VISIT (OUTPATIENT)
Dept: PHYSICAL THERAPY | Facility: CLINIC | Age: 69
End: 2024-11-01
Payer: COMMERCIAL

## 2024-11-01 DIAGNOSIS — Z98.890 STATUS POST HIP SURGERY: Primary | ICD-10-CM

## 2024-11-01 PROCEDURE — 97112 NEUROMUSCULAR REEDUCATION: CPT | Mod: GP | Performed by: PHYSICAL THERAPIST

## 2024-11-01 PROCEDURE — 97110 THERAPEUTIC EXERCISES: CPT | Mod: GP | Performed by: PHYSICAL THERAPIST

## 2024-11-14 ENCOUNTER — THERAPY VISIT (OUTPATIENT)
Dept: PHYSICAL THERAPY | Facility: CLINIC | Age: 69
End: 2024-11-14
Payer: COMMERCIAL

## 2024-11-14 DIAGNOSIS — Z98.890 STATUS POST HIP SURGERY: Primary | ICD-10-CM

## 2024-11-14 PROCEDURE — 97112 NEUROMUSCULAR REEDUCATION: CPT | Mod: GP | Performed by: PHYSICAL THERAPIST

## 2024-11-14 PROCEDURE — 97110 THERAPEUTIC EXERCISES: CPT | Mod: GP | Performed by: PHYSICAL THERAPIST

## 2024-11-16 ENCOUNTER — HEALTH MAINTENANCE LETTER (OUTPATIENT)
Age: 69
End: 2024-11-16

## 2024-11-16 ASSESSMENT — SLEEP AND FATIGUE QUESTIONNAIRES
HOW LIKELY ARE YOU TO NOD OFF OR FALL ASLEEP WHILE SITTING AND TALKING TO SOMEONE: WOULD NEVER DOZE
HOW LIKELY ARE YOU TO NOD OFF OR FALL ASLEEP WHILE LYING DOWN TO REST IN THE AFTERNOON WHEN CIRCUMSTANCES PERMIT: HIGH CHANCE OF DOZING
HOW LIKELY ARE YOU TO NOD OFF OR FALL ASLEEP WHEN YOU ARE A PASSENGER IN A CAR FOR AN HOUR WITHOUT A BREAK: SLIGHT CHANCE OF DOZING
HOW LIKELY ARE YOU TO NOD OFF OR FALL ASLEEP WHILE SITTING INACTIVE IN A PUBLIC PLACE: WOULD NEVER DOZE
HOW LIKELY ARE YOU TO NOD OFF OR FALL ASLEEP WHILE WATCHING TV: SLIGHT CHANCE OF DOZING
HOW LIKELY ARE YOU TO NOD OFF OR FALL ASLEEP WHILE SITTING QUIETLY AFTER LUNCH WITHOUT ALCOHOL: WOULD NEVER DOZE
HOW LIKELY ARE YOU TO NOD OFF OR FALL ASLEEP IN A CAR, WHILE STOPPED FOR A FEW MINUTES IN TRAFFIC: WOULD NEVER DOZE
HOW LIKELY ARE YOU TO NOD OFF OR FALL ASLEEP WHILE SITTING AND READING: SLIGHT CHANCE OF DOZING

## 2024-11-19 ENCOUNTER — OFFICE VISIT (OUTPATIENT)
Dept: SLEEP MEDICINE | Facility: CLINIC | Age: 69
End: 2024-11-19
Payer: COMMERCIAL

## 2024-11-19 VITALS
SYSTOLIC BLOOD PRESSURE: 147 MMHG | RESPIRATION RATE: 16 BRPM | HEART RATE: 79 BPM | HEIGHT: 64 IN | WEIGHT: 202 LBS | BODY MASS INDEX: 34.49 KG/M2 | DIASTOLIC BLOOD PRESSURE: 80 MMHG | OXYGEN SATURATION: 97 %

## 2024-11-19 DIAGNOSIS — G47.33 OBSTRUCTIVE SLEEP APNEA (ADULT) (PEDIATRIC): Primary | ICD-10-CM

## 2024-11-19 PROCEDURE — 99213 OFFICE O/P EST LOW 20 MIN: CPT

## 2024-11-19 RX ORDER — CHOLECALCIFEROL (VITAMIN D3) 1250 MCG
1250 CAPSULE ORAL
COMMUNITY

## 2024-11-19 NOTE — NURSING NOTE
"Chief Complaint   Patient presents with    CPAP Follow Up       Initial BP (!) 168/79   Pulse 79   Resp 16   Ht 1.613 m (5' 3.5\")   Wt 91.6 kg (202 lb)   SpO2 97%   BMI 35.22 kg/m   Estimated body mass index is 35.22 kg/m  as calculated from the following:    Height as of this encounter: 1.613 m (5' 3.5\").    Weight as of this encounter: 91.6 kg (202 lb).    Medication Reconciliation: complete    Neck circumference: 15.74 inches / 40 centimeters.    DME: Elmo Kilgore CMA on 11/19/2024 at 9:37 AM      "

## 2024-11-19 NOTE — NURSING NOTE
1 year appointment reminder message was created and will be sent out 2 - 3 months prior to next appointment due date. Via [employer]

## 2024-11-19 NOTE — PROGRESS NOTES
Sleep Apnea - Follow-up Visit:    Impression/Plan:  (G47.33) Obstructive sleep apnea (adult) (pediatric) (primary encounter diagnosis)  Comment: Mary Lou Connelly presents for follow-up of her moderate sleep apnea, managed with CPAP. She is here to document compliance with her new machine received on 10/08/2024. She has met compliance. She used her CPAP 28/30 days and 90% of days > 4 hours of use. She is tolerating her CPAP pressures and she feels benefit from use. Her download shows her apnea is well controlled with a residual AHI of 0.8 events per hour.   Plan: Comprehensive DME  Continue auto-PAP 4-10 cmH2O. A prescription was written for new supplies. We reviewed recommendations for cleaning and replacing supplies. Mary Lou's blood pressure was elevated x2 in the clinic today. She has not taken all of her medications yet this morning. Encouraged her to go home and take her medications and recheck her blood pressure later in the day.      Mary Lou Connelly will follow up in about 1 year(s).     Total time spent reviewing medical records, history and physical examination, review of previous testing and interpretation as well as documentation on this date: 11 minutes     CC: Suzy Herring Np,     History of Present Illness:  Chief Complaint   Patient presents with    CPAP Follow Up     Mary Lou Connelly presents for follow-up of her moderate sleep apnea, managed with CPAP. She is here to document compliance with her new machine received on 10/08/2024.      HST 4/14 (#227)- AHI 20.2, supine AHI 27.9, O2 yuan 76%.    DME: [facility]    Do you use a CPAP Machine at home: (Patient-Rptd) Yes  Overall, on a scale of 0-10 how would you rate your CPAP (0 poor, 10 great): (Patient-Rptd) 10    What type of mask do you use: (Patient-Rptd) Nasal Mask  Is your mask comfortable: (Patient-Rptd) Yes  If not, why:      Is your mask leaking: (Patient-Rptd) No  If yes, where do you feel it:    How many night per week does the mask leak (0-7):       Do you notice snoring with mask on: (Patient-Rptd) No  Do you notice gasping arousals with mask on: (Patient-Rptd) No  Are you having significant oral or nasal dryness: (Patient-Rptd) No  Is the pressure setting comfortable: (Patient-Rptd) Yes  If not, why:      What is your typical bedtime: (Patient-Rptd) 11 PM  How long does it take you to go to sleep on PAP therapy: (Patient-Rptd) 10minutes  What time do you typically get out of bed for the day: (Patient-Rptd) 630 AM  How many hours on average per night are you using PAP therapy: (Patient-Rptd) 7 hours  How many hours are you sleeping per night: (Patient-Rptd) 7 hours  Do you feel well rested in the morning: (Patient-Rptd) Yes    She wakes once to twice in the night to use the bathroom.     ResMed AirSense 10  Auto-PAP 4.0 - 10.0 cmH2O 30 day usage data: 10/19/2024-11/17/2024    90% of days with > 4 hours of use. 2/30 days with no use.   Average use 6 hours 50 minutes per day.   95%ile Leak 19.9 L/min.   CPAP 95% pressure 9.3 cm.   AHI 0.79 events per hour.     EPWORTH SLEEPINESS SCALE         11/16/2024     2:14 PM    Mckinney Sleepiness Scale ( JAMES Mello  [id number]<br>ESS - [place]/English - Final version - 21 Nov 07 - [facility] [place].)   Sitting and reading Slight chance of dozing   Watching TV Slight chance of dozing   Sitting, inactive in a public place (e.g. a theatre or a meeting) Would never doze   As a passenger in a car for an hour without a break Slight chance of dozing   Lying down to rest in the afternoon when circumstances permit High chance of dozing   Sitting and talking to someone Would never doze   Sitting quietly after a lunch without alcohol Would never doze   In a car, while stopped for a few minutes in traffic Would never doze   Mckinney Score (MC) 6   Mckinney Score (Sleep) 6        Patient-reported       INSOMNIA SEVERITY INDEX (ANTON)          11/16/2024     2:09 PM   Insomnia Severity Index (ANTON)   Difficulty falling asleep 0     Difficulty staying asleep 0    Problems waking up too early 1    How SATISFIED/DISSATISFIED are you with your CURRENT sleep pattern? 0    How NOTICEABLE to others do you think your sleep problem is in terms of impairing the quality of your life? 0    How WORRIED/DISTRESSED are you about your current sleep problem? 0    To what extent do you consider your sleep problem to INTERFERE with your daily functioning (e.g. daytime fatigue, mood, ability to function at work/daily chores, concentration, memory, mood, etc.) CURRENTLY? 0    ANTON Total Score 1        Patient-reported       Guidelines for Scoring/Interpretation:  Total score categories:  0-7 = No clinically significant insomnia   8-14 = Subthreshold insomnia   15-21 = Clinical insomnia (moderate severity)  22-28 = Clinical insomnia (severe)  Used via courtesy of www.[employer]tr[employer].[place].gov with permission from Del Page PhD., [facility]      Past medical/surgical history, family history, social history, medications and allergies were reviewed.        Problem List:  Patient Active Problem List    Diagnosis Date Noted    Arthritis of left hip 08/20/2024     Priority: Medium    Class 2 severe obesity due to excess calories with serious comorbidity in adult (H) 05/23/2024     Priority: Medium    Status post hip surgery 09/22/2020     Priority: Medium    Other secondary osteoarthritis of right hip 08/12/2020     Priority: Medium     Added automatically from request for surgery [id number]      Fracture of sacrum, unspecified fracture morphology, initial encounter 05/18/2017     Priority: Medium    Other urinary incontinence 05/18/2017     Priority: Medium    Obesity, unspecified obesity severity, unspecified obesity type 04/26/2016     Priority: Medium    Seborrheic keratoses 04/26/2016     Priority: Medium    Choledocholithiasis 05/31/2015     Priority: Medium    Depression 05/31/2015     Priority: Medium    Environmental allergies 05/31/2015     Priority: Medium     "Varicose veins with pain 07/15/2014     Priority: Medium    Bilateral lower extremity edema 07/15/2014     Priority: Medium    Obstructive sleep apnea syndrome 04/08/2014     Priority: Medium     HST 4/14 (#227)- AHI 20.2, supine AHI 27.9, L02 76%      Generalized anxiety disorder 04/01/2013     Priority: Medium    Seasonal allergies 08/09/2012     Priority: Medium    Hypertension goal BP (blood pressure) < 140/90 08/09/2012     Priority: Medium    Meniere disease 10/26/2011     Priority: Medium    Vitamin D deficiency 10/26/2011     Priority: Medium    CARDIOVASCULAR SCREENING; LDL GOAL LESS THAN 160 10/31/2010     Priority: Medium    Family history of malignant neoplasm of gastrointestinal tract 05/08/2007     Priority: Medium    Malignant neoplasm of anus (H) 05/08/2007     Priority: Medium        BP (!) 147/80   Pulse 79   Resp 16   Ht 1.613 m (5' 3.5\")   Wt 91.6 kg (202 lb)   SpO2 97%   BMI 35.22 kg/m      Luisito Odonnell, APRN CNP  "

## 2024-11-22 DIAGNOSIS — J45.30 MILD PERSISTENT ASTHMA WITHOUT COMPLICATION: ICD-10-CM

## 2024-11-25 ENCOUNTER — THERAPY VISIT (OUTPATIENT)
Dept: PHYSICAL THERAPY | Facility: CLINIC | Age: 69
End: 2024-11-25
Payer: COMMERCIAL

## 2024-11-25 DIAGNOSIS — Z98.890 STATUS POST HIP SURGERY: Primary | ICD-10-CM

## 2024-11-25 PROCEDURE — 97112 NEUROMUSCULAR REEDUCATION: CPT | Mod: GP | Performed by: PHYSICAL THERAPIST

## 2024-11-25 PROCEDURE — 97110 THERAPEUTIC EXERCISES: CPT | Mod: GP | Performed by: PHYSICAL THERAPIST

## 2024-11-25 RX ORDER — FLUTICASONE PROPIONATE 50 MCG
1 SPRAY, SUSPENSION (ML) NASAL DAILY
Qty: 11.1 ML | Refills: 5 | Status: SHIPPED | OUTPATIENT
Start: 2024-11-25

## 2024-11-25 NOTE — TELEPHONE ENCOUNTER
Requested Prescriptions   Pending Prescriptions Disp Refills    fluticasone (FLONASE) 50 MCG/ACT nasal spray 11.1 mL 1     Sig: Spray 1 spray into both nostrils daily.       Nasal Allergy Protocol Passed - 11/25/2024 11:35 AM        Passed - Patient is age 12 or older        Passed - Medication is active on med list        Passed - Recent (12 mo) or future (90 days) visit within the authorizing provider's specialty     The patient must have completed an in-person or virtual visit within the past 12 months or has a future visit scheduled within the next 90 days with the authorizing provider s specialty.  Urgent care and e-visits do not qualify as an office visit for this protocol.          Passed - Medication indicated for associated diagnosis     Medication is associated with one or more of the following diagnoses:   Allergic conjunctivitis  Allergies  Nasal congestion  Nasal discharge  Rhinitis  Sinus congestion  Recurrent acute maxillary sinusitis  Environmental allergies   Acute sinusitis   Cystic Fibrosis  Bronchiectasis             Refill sent  Og Arredondo RN

## 2025-05-04 ENCOUNTER — OFFICE VISIT (OUTPATIENT)
Dept: URGENT CARE | Facility: URGENT CARE | Age: 70
End: 2025-05-04
Payer: COMMERCIAL

## 2025-05-04 VITALS
WEIGHT: 207 LBS | DIASTOLIC BLOOD PRESSURE: 84 MMHG | RESPIRATION RATE: 18 BRPM | SYSTOLIC BLOOD PRESSURE: 165 MMHG | TEMPERATURE: 98.8 F | BODY MASS INDEX: 35.34 KG/M2 | OXYGEN SATURATION: 98 % | HEART RATE: 95 BPM | HEIGHT: 64 IN

## 2025-05-04 DIAGNOSIS — J30.2 SEASONAL ALLERGIES: ICD-10-CM

## 2025-05-04 DIAGNOSIS — R07.0 THROAT PAIN: Primary | ICD-10-CM

## 2025-05-04 LAB — DEPRECATED S PYO AG THROAT QL EIA: NEGATIVE

## 2025-05-04 PROCEDURE — 87651 STREP A DNA AMP PROBE: CPT | Performed by: NURSE PRACTITIONER

## 2025-05-04 PROCEDURE — 87635 SARS-COV-2 COVID-19 AMP PRB: CPT | Performed by: NURSE PRACTITIONER

## 2025-05-04 PROCEDURE — 3079F DIAST BP 80-89 MM HG: CPT | Performed by: NURSE PRACTITIONER

## 2025-05-04 PROCEDURE — 3077F SYST BP >= 140 MM HG: CPT | Performed by: NURSE PRACTITIONER

## 2025-05-04 PROCEDURE — 99203 OFFICE O/P NEW LOW 30 MIN: CPT | Performed by: NURSE PRACTITIONER

## 2025-05-04 NOTE — PROGRESS NOTES
Assessment & Plan     Throat pain    - Streptococcus A Rapid Screen w/Reflex to PCR - Clinic Collect  - Group A Streptococcus PCR Throat Swab  - COVID-19 Virus (Coronavirus) by PCR Nose    Seasonal allergies       Reviewed negative rapid strep results during visit, PCR testing in process and COVID test in process, will notify if positive. Discussed symptoms likely allergic and viral with GERD in nature and antibiotic not indicated at this time. Recommended Rest, fluids, nasal saline with neti pot, steam, humidifier, reducing dairy, gargle warm salt water, throat lozenges with menthol, chloraseptic spray    Go to emergency room if esophageal pain worsens or cannot drink enough to void at least every 8 hours    Follow-up with PCP if symptoms persist for 7 days, and sooner if symptoms worsen or new symptoms develop.     Discussed red flag symptoms which warrant immediate visit in emergency room    All questions were answered and patient verbalized understanding. AVS sent via [employer], DNP, APRN, CNP 5/4/2025 7:11 PM  [facility]RG[facility] [place]    Kamlesh Barreto is a 69 year old female who presents to clinic today for the following health issues:  Chief Complaint   Patient presents with    Pharyngitis         5/4/2025     6:42 PM   Additional Questions   Roomed by ksenia   Accompanied by self         5/4/2025     6:42 PM   Patient Reported Additional Medications   Patient reports taking the following new medications no       Patient presents for evaluation of sore throat. Associated symptoms: post-nasal drip, heartburn.    Symptoms have been present for 3 days. Has been taking Mucous meds twice daily which helps temporaily. Has thrown up bile about 4-5 times today. Does not have burning in esophagus currently. Denies nausea, fever, ear pain, shortness of breath, teeth pain, headache, body aches.   She has a history of of GERD and takes Colestipol and omeprazole 20 mg DR daily    She had a  "cold 2 weeks ago which resolved.   She has a history of HTN. She is going on a retreat tomorrow and wants to make sure she isn't contagious. She has had radiation years ago for anal cancer and gets bile   She sees Dr. Barrett in GI for this. She has had a weight loss sleeve. She has a history of allergies and has been taking Allegra and flonase.       Problem list, Medication list, Allergies, and Medical history reviewed in EPIC.    ROS:  Review of systems negative except for noted above        Objective    BP (!) 165/84 (BP Location: Left arm, Patient Position: Sitting, Cuff Size: Adult Large)   Pulse 95   Temp 98.8  F (37.1  C) (Tympanic)   Resp 18   Ht 1.626 m (5' 4\")   Wt 93.9 kg (207 lb)   SpO2 98%   BMI 35.53 kg/m    Physical Exam  Constitutional:       General: She is not in acute distress.     Appearance: She is not toxic-appearing or diaphoretic.   HENT:      Head: Normocephalic and atraumatic.      Right Ear: Tympanic membrane, ear canal and external ear normal.      Left Ear: Tympanic membrane, ear canal and external ear normal.      Nose: Congestion present.      Mouth/Throat:      Mouth: Mucous membranes are moist.      Pharynx: Oropharynx is clear. No oropharyngeal exudate or posterior oropharyngeal erythema.      Tonsils: No tonsillar exudate or tonsillar abscesses. 0 on the right. 0 on the left.   Cardiovascular:      Rate and Rhythm: Normal rate and regular rhythm.      Heart sounds: Normal heart sounds.   Pulmonary:      Effort: Pulmonary effort is normal. No respiratory distress.      Breath sounds: Normal breath sounds. No wheezing or rales.   Lymphadenopathy:      Cervical: No cervical adenopathy.   Neurological:      Mental Status: She is alert.              Labs:  Results for orders placed or performed in visit on 05/04/25   Streptococcus A Rapid Screen w/Reflex to PCR - Clinic Collect     Status: Normal    Specimen: Throat; Swab   Result Value Ref Range    Group A Strep antigen " Negative Negative

## 2025-05-04 NOTE — PROGRESS NOTES
Urgent Care Clinic Visit    Chief Complaint   Patient presents with    Pharyngitis              5/4/2025     6:42 PM   Additional Questions   Roomed by ksenia   Accompanied by self         5/4/2025     6:42 PM   Patient Reported Additional Medications   Patient reports taking the following new medications no     No

## 2025-05-05 LAB
S PYO DNA THROAT QL NAA+PROBE: NOT DETECTED
SARS-COV-2 RNA RESP QL NAA+PROBE: NEGATIVE

## 2025-05-05 NOTE — PATIENT INSTRUCTIONS
Rest, fluids, nasal saline with neti pot, steam, humidifier, reducing dairy, gargle warm salt water, throat lozenges with menthol, chloraseptic spray

## 2025-05-29 ENCOUNTER — OFFICE VISIT (OUTPATIENT)
Dept: URGENT CARE | Facility: URGENT CARE | Age: 70
End: 2025-05-29
Payer: COMMERCIAL

## 2025-05-29 VITALS
RESPIRATION RATE: 20 BRPM | SYSTOLIC BLOOD PRESSURE: 137 MMHG | DIASTOLIC BLOOD PRESSURE: 71 MMHG | HEART RATE: 76 BPM | OXYGEN SATURATION: 98 % | TEMPERATURE: 97.4 F

## 2025-05-29 DIAGNOSIS — N12 PYELONEPHRITIS: Primary | ICD-10-CM

## 2025-05-29 DIAGNOSIS — R44.1 VISUAL HALLUCINATIONS: ICD-10-CM

## 2025-05-29 DIAGNOSIS — R42 DIZZINESS: ICD-10-CM

## 2025-05-29 DIAGNOSIS — R31.0 GROSS HEMATURIA: ICD-10-CM

## 2025-05-29 DIAGNOSIS — R53.1 WEAKNESS: ICD-10-CM

## 2025-05-29 DIAGNOSIS — R30.0 DYSURIA: ICD-10-CM

## 2025-05-29 LAB
ALBUMIN UR-MCNC: 30 MG/DL
APPEARANCE UR: ABNORMAL
BACTERIA #/AREA URNS HPF: ABNORMAL /HPF
BILIRUB UR QL STRIP: NEGATIVE
COLOR UR AUTO: YELLOW
GLUCOSE UR STRIP-MCNC: NEGATIVE MG/DL
HGB UR QL STRIP: ABNORMAL
KETONES UR STRIP-MCNC: 40 MG/DL
LEUKOCYTE ESTERASE UR QL STRIP: ABNORMAL
NITRATE UR QL: NEGATIVE
PH UR STRIP: 6 [PH] (ref 5–7)
RBC #/AREA URNS AUTO: ABNORMAL /HPF
SP GR UR STRIP: 1.01 (ref 1–1.03)
SQUAMOUS #/AREA URNS AUTO: ABNORMAL /LPF
UROBILINOGEN UR STRIP-ACNC: 0.2 E.U./DL
WBC #/AREA URNS AUTO: >100 /HPF

## 2025-05-29 NOTE — PROGRESS NOTES
Assessment & Plan     Pyelonephritis      Dysuria    - UA Macroscopic with reflex to Microscopic and Culture - Lab Collect  - UA Macroscopic with reflex to Microscopic and Culture - Lab Collect  - Urine Microscopic Exam    Visual hallucinations      Dizziness      Gross hematuria      Weakness       Reviewed UA showing pyelonephritis but with dizziness, weakness, not eating, feeling delusional, visual hallucinations and decreased sensation in abdomen and flank with fever recommend further evaluation in ED as higher level of care indicated. Patient agreeable and discharged in stable condition.         Cindy Avila NP  [facility]    Kamlesh Barreto is a 69 year old female who presents to clinic today for the following health issues:  Chief Complaint   Patient presents with    UTI     Pain and bleeding started Sunday night, frequency, felt delusional yesterday, shakes, brown urine   Had a fever one day  Did e-visit- prescribed nitrofurantoin   Had procedure done 5/7 5/29/2025     2:31 PM   Additional Questions   Roomed by Sierra   Accompanied by Self         5/29/2025     2:31 PM   Patient Reported Additional Medications   Patient reports taking the following new medications Nitrofurantoin       UTI    Onset of symptoms was 4day(s).  Course of illness is improving  Current and associated symptoms dysuria, frequency, urgency, hematuria, dizziness, feeling delusional, visual hallucinations, weakness, fever of 103F two days ago  Denies emesis  She has not been eating- only about 200 calories daily  Has been drinking 5 glasses water daily  Drinks a serving of caffeine    She did a virtual visit for symptoms 5/26 and was treated with Nitrofurantoin twice daily for 7 days  She has a history of anal cancer and has very little feeling in her abdomen and flank.     Problem list, Medication list, Allergies, and Medical history reviewed in EPIC.    ROS:  Review of systems negative  except for noted above        Objective    /71   Pulse 76   Temp 97.4  F (36.3  C) (Oral)   Resp 20   SpO2 98%   Physical Exam  Constitutional:       General: She is not in acute distress.     Appearance: She is not toxic-appearing or diaphoretic.   Abdominal:      General: Bowel sounds are normal. There is no distension.      Palpations: Abdomen is soft.      Tenderness: There is no abdominal tenderness. There is no right CVA tenderness, left CVA tenderness, guarding or rebound.   Skin:     General: Skin is warm and dry.   Neurological:      Mental Status: She is alert.      Motor: Weakness present.      Gait: Gait abnormal.      Comments: Using a walker              Labs:  Results for orders placed or performed in visit on 05/29/25   UA Macroscopic with reflex to Microscopic and Culture - Lab Collect     Status: Abnormal    Specimen: Urine, Clean Catch   Result Value Ref Range    Color Urine Yellow Colorless, Straw, Light Yellow, Yellow    Appearance Urine Cloudy (A) Clear    Glucose Urine Negative Negative mg/dL    Bilirubin Urine Negative Negative    Ketones Urine 40 (A) Negative mg/dL    Specific Gravity Urine 1.010 1.003 - 1.035    Blood Urine Moderate (A) Negative    pH Urine 6.0 5.0 - 7.0    Protein Albumin Urine 30 (A) Negative mg/dL    Urobilinogen Urine 0.2 0.2, 1.0 E.U./dL    Nitrite Urine Negative Negative    Leukocyte Esterase Urine Large (A) Negative   Urine Microscopic Exam     Status: Abnormal   Result Value Ref Range    Bacteria Urine Few (A) None Seen /HPF    RBC Urine 10-25 (A) 0-2 /HPF /HPF    WBC Urine >100 (A) 0-5 /HPF /HPF    Squamous Epithelials Urine Few (A) None Seen /LPF

## 2025-05-29 NOTE — PATIENT INSTRUCTIONS
Go to emergency room for further evaluation of feeling delusional, visual hallucinations, fever 103F 5/27, dysuria, hematuria, dizziness, weakness  Started Macrobid 5/26  Has not been eating for days

## 2025-05-29 NOTE — PROGRESS NOTES
Urgent Care Clinic Visit    Chief Complaint   Patient presents with    UTI     Pain and bleeding started Sunday night, frequency, felt delusional yesterday, shakes, brown urine   Had a fever one day  Did e-visit- prescribed nitrofurantoin   Had procedure done 5/7 5/29/2025     2:31 PM   Additional Questions   Roomed by Sierra   Accompanied by Self         5/29/2025     2:31 PM   Patient Reported Additional Medications   Patient reports taking the following new medications Nitrofurantoin     Pre-Provider Visit Orders- Urinalysis UA/UC  Patient reports the following symptoms:  discomfort, pain or burning with urination   Does the patient report any of the following symptoms: vaginal discharge, vaginal itching, possible yeast infection, has a urinary catheter in place, or unable to void in a specimen cup?  No

## 2025-08-24 ENCOUNTER — HEALTH MAINTENANCE LETTER (OUTPATIENT)
Age: 70
End: 2025-08-24

## (undated) DEVICE — SU ETHIBOND 1 CTX CR 8/18" CX30D

## (undated) DEVICE — DRAPE STOCKINETTE 8" 8586

## (undated) DEVICE — SU SILK 2-0 TIE 12X30" A305H

## (undated) DEVICE — SOL NACL 0.9% IRRIG 1000ML BOTTLE 2F7124

## (undated) DEVICE — DRSG AQUACEL AG 3.5X9.75" HYDROFIBER 412011

## (undated) DEVICE — GLOVE PROTEXIS W/NEU-THERA 7.0  2D73TE70

## (undated) DEVICE — DRSG TEGADERM ALGINATE AG 4X5" 90303

## (undated) DEVICE — PREP DURAPREP REMOVER 4OZ 8611

## (undated) DEVICE — PREP DURAPREP 26ML APL 8630

## (undated) DEVICE — SU VICRYL 2-0 CT 36" J957H

## (undated) DEVICE — ESU ELEC BLADE 6" COATED/INSULATED E1455-6

## (undated) DEVICE — GLOVE PROTEXIS W/NEU-THERA 8.0  2D73TE80

## (undated) DEVICE — SU PDS II 3-0 PS-1 18" Z683G

## (undated) DEVICE — DRSG TEGADERM 4X10" 1627

## (undated) DEVICE — LINEN GOWN X4 5410

## (undated) DEVICE — SU SILK 2-0 PS 18" 1588H

## (undated) DEVICE — DRSG KERLIX 4 1/2"X4YDS ROLL 6730

## (undated) DEVICE — BLADE SAW SAGITTAL STRK 25X90X1.27MM HD SYS 6 6125-127-090

## (undated) DEVICE — POSITIONER ABDUCTION PILLOW FOAM MED FP-ABDUCTM

## (undated) DEVICE — DRAPE IOBAN INCISE 36X23" 6651EZ

## (undated) DEVICE — Device

## (undated) DEVICE — GLOVE BIOGEL PI MICRO INDICATOR UNDERGLOVE SZ 7.5 48975

## (undated) DEVICE — GOWN IMPERVIOUS SPECIALTY XLG/XLONG 32474

## (undated) DEVICE — STRAP KNEE/BODY 31143004

## (undated) DEVICE — SOL WATER IRRIG 1000ML BOTTLE 2F7114

## (undated) DEVICE — WIPES FOLEY CARE SURESTEP PROVON DFC100

## (undated) DEVICE — SU PDS II 4-0 PS-2 18" Z496G

## (undated) DEVICE — LINEN GOWN OVERSIZE 5408

## (undated) DEVICE — LINEN MAYO STAND COVER OVERSIZE PACK 5458

## (undated) DEVICE — SU VICRYL 0 CTX 36" J370H

## (undated) DEVICE — DECANTER VIAL 2006S

## (undated) DEVICE — GOWN XLG DISP 9545

## (undated) DEVICE — STRAP STIRRUP W/SLIP 30187-030

## (undated) DEVICE — SU DERMABOND ADVANCED .7ML DNX12

## (undated) DEVICE — SUCTION MANIFOLD NEPTUNE 2 SYS 4 PORT 0702-020-000

## (undated) DEVICE — SU SILK 2-0 SH 30" K833H

## (undated) DEVICE — SU VICRYL 0 CT-1 3X27" J430T

## (undated) DEVICE — LINEN TOWEL PACK X5 5464

## (undated) DEVICE — LINEN BACK PACK 5440

## (undated) DEVICE — BLADE KNIFE SURG 15 371115

## (undated) DEVICE — GLOVE PROTEXIS BLUE W/NEU-THERA 8.0  2D73EB80

## (undated) DEVICE — PREP POVIDONE-IODINE 7.5% SCRUB 4OZ BOTTLE MDS093945

## (undated) DEVICE — ESU GROUND PAD UNIVERSAL W/O CORD

## (undated) DEVICE — GLOVE PROTEXIS BLUE W/NEU-THERA 7.5  2D73EB75

## (undated) DEVICE — DRILL BIT BIOM QUICK CONNECTING RING LOCK 3.2X40MM 31-323240

## (undated) DEVICE — PACK TOTAL HIP W/POUCH RIVERSIDE LATEX FREE

## (undated) DEVICE — GLOVE PROTEXIS POWDER FREE 7.5 ORTHOPEDIC 2D73ET75

## (undated) DEVICE — GLOVE BIOGEL PI ULTRATOUCH SZ 7.0 41170

## (undated) DEVICE — SUCTION TIP YANKAUER STR K87

## (undated) DEVICE — SU ETHIBOND 0 CT-1 CR 8X18" CX21D

## (undated) DEVICE — TRAY PREP DRY SKIN SCRUB 067

## (undated) DEVICE — PREP SKIN SCRUB TRAY 4461A

## (undated) DEVICE — DRSG STERI STRIP 1/2X4" R1547

## (undated) RX ORDER — TRANEXAMIC ACID 650 MG/1
TABLET ORAL
Status: DISPENSED
Start: 2020-09-22

## (undated) RX ORDER — TRANEXAMIC ACID 650 MG/1
TABLET ORAL
Status: DISPENSED
Start: 2024-08-20

## (undated) RX ORDER — GABAPENTIN 100 MG/1
CAPSULE ORAL
Status: DISPENSED
Start: 2024-08-20

## (undated) RX ORDER — CEFAZOLIN SODIUM 1 G/3ML
INJECTION, POWDER, FOR SOLUTION INTRAMUSCULAR; INTRAVENOUS
Status: DISPENSED
Start: 2020-09-22

## (undated) RX ORDER — ALBUTEROL SULFATE 90 UG/1
AEROSOL, METERED RESPIRATORY (INHALATION)
Status: DISPENSED
Start: 2020-09-22

## (undated) RX ORDER — CITRIC ACID/SODIUM CITRATE 334-500MG
SOLUTION, ORAL ORAL
Status: DISPENSED
Start: 2024-08-20

## (undated) RX ORDER — CEFAZOLIN SODIUM/WATER 2 G/20 ML
SYRINGE (ML) INTRAVENOUS
Status: DISPENSED
Start: 2024-08-20

## (undated) RX ORDER — PROPOFOL 10 MG/ML
INJECTION, EMULSION INTRAVENOUS
Status: DISPENSED
Start: 2020-09-22

## (undated) RX ORDER — FENTANYL CITRATE 50 UG/ML
INJECTION, SOLUTION INTRAMUSCULAR; INTRAVENOUS
Status: DISPENSED
Start: 2024-08-20

## (undated) RX ORDER — GLYCOPYRROLATE 0.2 MG/ML
INJECTION, SOLUTION INTRAMUSCULAR; INTRAVENOUS
Status: DISPENSED
Start: 2020-09-22

## (undated) RX ORDER — FENTANYL CITRATE 50 UG/ML
INJECTION, SOLUTION INTRAMUSCULAR; INTRAVENOUS
Status: DISPENSED
Start: 2020-09-22

## (undated) RX ORDER — CELECOXIB 200 MG/1
CAPSULE ORAL
Status: DISPENSED
Start: 2024-08-20

## (undated) RX ORDER — OXYCODONE HYDROCHLORIDE 5 MG/1
TABLET ORAL
Status: DISPENSED
Start: 2024-08-20

## (undated) RX ORDER — ACETAMINOPHEN 325 MG/1
TABLET ORAL
Status: DISPENSED
Start: 2020-09-22

## (undated) RX ORDER — HYDROMORPHONE HYDROCHLORIDE 1 MG/ML
INJECTION, SOLUTION INTRAMUSCULAR; INTRAVENOUS; SUBCUTANEOUS
Status: DISPENSED
Start: 2020-09-22

## (undated) RX ORDER — EPHEDRINE SULFATE 50 MG/ML
INJECTION, SOLUTION INTRAMUSCULAR; INTRAVENOUS; SUBCUTANEOUS
Status: DISPENSED
Start: 2020-09-22

## (undated) RX ORDER — HYDROMORPHONE HYDROCHLORIDE 1 MG/ML
INJECTION, SOLUTION INTRAMUSCULAR; INTRAVENOUS; SUBCUTANEOUS
Status: DISPENSED
Start: 2024-08-20

## (undated) RX ORDER — ACETAMINOPHEN 325 MG/1
TABLET ORAL
Status: DISPENSED
Start: 2024-08-20

## (undated) RX ORDER — ONDANSETRON 2 MG/ML
INJECTION INTRAMUSCULAR; INTRAVENOUS
Status: DISPENSED
Start: 2020-09-22

## (undated) RX ORDER — OXYCODONE HYDROCHLORIDE 5 MG/1
TABLET ORAL
Status: DISPENSED
Start: 2020-09-22

## (undated) RX ORDER — CEFAZOLIN SODIUM 2 G/100ML
INJECTION, SOLUTION INTRAVENOUS
Status: DISPENSED
Start: 2020-09-22